# Patient Record
Sex: FEMALE | Race: WHITE | NOT HISPANIC OR LATINO | Employment: FULL TIME | URBAN - METROPOLITAN AREA
[De-identification: names, ages, dates, MRNs, and addresses within clinical notes are randomized per-mention and may not be internally consistent; named-entity substitution may affect disease eponyms.]

---

## 2017-08-27 ENCOUNTER — HOSPITAL ENCOUNTER (OUTPATIENT)
Dept: URGENT CARE | Facility: CLINIC | Age: 47
Discharge: HOME | End: 2017-08-27
Attending: FAMILY MEDICINE | Admitting: FAMILY MEDICINE

## 2019-01-10 ENCOUNTER — OFFICE (AMBULATORY)
Dept: URBAN - METROPOLITAN AREA CLINIC 64 | Facility: CLINIC | Age: 49
End: 2019-01-10

## 2019-01-10 VITALS
DIASTOLIC BLOOD PRESSURE: 83 MMHG | HEIGHT: 68 IN | HEART RATE: 84 BPM | WEIGHT: 293 LBS | SYSTOLIC BLOOD PRESSURE: 150 MMHG

## 2019-01-10 DIAGNOSIS — R19.4 CHANGE IN BOWEL HABIT: ICD-10-CM

## 2019-01-10 DIAGNOSIS — R11.0 NAUSEA: ICD-10-CM

## 2019-01-10 DIAGNOSIS — R10.13 EPIGASTRIC PAIN: ICD-10-CM

## 2019-01-10 PROCEDURE — 99244 OFF/OP CNSLTJ NEW/EST MOD 40: CPT | Performed by: INTERNAL MEDICINE

## 2019-01-10 RX ORDER — DEXLANSOPRAZOLE 60 MG/1
60 CAPSULE, DELAYED RELEASE ORAL
Qty: 30 | Refills: 11 | Status: ACTIVE
Start: 2019-01-10

## 2019-01-11 ENCOUNTER — OFFICE (AMBULATORY)
Dept: URBAN - METROPOLITAN AREA PATHOLOGY 4 | Facility: PATHOLOGY | Age: 49
End: 2019-01-11

## 2019-01-11 ENCOUNTER — ON CAMPUS - OUTPATIENT (AMBULATORY)
Dept: URBAN - METROPOLITAN AREA HOSPITAL 2 | Facility: HOSPITAL | Age: 49
End: 2019-01-11

## 2019-01-11 VITALS
OXYGEN SATURATION: 95 % | OXYGEN SATURATION: 97 % | WEIGHT: 293 LBS | SYSTOLIC BLOOD PRESSURE: 136 MMHG | DIASTOLIC BLOOD PRESSURE: 88 MMHG | DIASTOLIC BLOOD PRESSURE: 66 MMHG | DIASTOLIC BLOOD PRESSURE: 83 MMHG | OXYGEN SATURATION: 96 % | DIASTOLIC BLOOD PRESSURE: 72 MMHG | SYSTOLIC BLOOD PRESSURE: 123 MMHG | SYSTOLIC BLOOD PRESSURE: 133 MMHG | DIASTOLIC BLOOD PRESSURE: 80 MMHG | HEART RATE: 74 BPM | DIASTOLIC BLOOD PRESSURE: 90 MMHG | HEART RATE: 71 BPM | OXYGEN SATURATION: 98 % | SYSTOLIC BLOOD PRESSURE: 140 MMHG | HEIGHT: 68 IN | TEMPERATURE: 97.8 F | HEART RATE: 78 BPM | HEART RATE: 75 BPM | RESPIRATION RATE: 15 BRPM | SYSTOLIC BLOOD PRESSURE: 125 MMHG | RESPIRATION RATE: 18 BRPM | RESPIRATION RATE: 16 BRPM

## 2019-01-11 DIAGNOSIS — R10.13 EPIGASTRIC PAIN: ICD-10-CM

## 2019-01-11 DIAGNOSIS — Z48.815 ENCOUNTER FOR SURGICAL AFTERCARE FOLLOWING SURGERY ON THE DI: ICD-10-CM

## 2019-01-11 LAB
GI HISTOLOGY: A. UNSPECIFIED: (no result)
GI HISTOLOGY: PDF REPORT: (no result)

## 2019-01-11 PROCEDURE — 88305 TISSUE EXAM BY PATHOLOGIST: CPT | Performed by: INTERNAL MEDICINE

## 2019-01-11 PROCEDURE — 43239 EGD BIOPSY SINGLE/MULTIPLE: CPT | Performed by: INTERNAL MEDICINE

## 2019-01-11 RX ORDER — SUCRALFATE 1 G/1
TABLET ORAL
Qty: 120 | Refills: 11 | Status: ACTIVE

## 2019-05-21 ENCOUNTER — LAB (OUTPATIENT)
Dept: LAB | Facility: HOSPITAL | Age: 49
End: 2019-05-21

## 2019-05-21 ENCOUNTER — OFFICE VISIT (OUTPATIENT)
Dept: NEUROLOGY | Facility: CLINIC | Age: 49
End: 2019-05-21

## 2019-05-21 VITALS
HEIGHT: 68 IN | SYSTOLIC BLOOD PRESSURE: 150 MMHG | DIASTOLIC BLOOD PRESSURE: 92 MMHG | WEIGHT: 293 LBS | HEART RATE: 80 BPM | BODY MASS INDEX: 44.41 KG/M2 | OXYGEN SATURATION: 98 %

## 2019-05-21 DIAGNOSIS — G89.29 HIP PAIN, CHRONIC, RIGHT: ICD-10-CM

## 2019-05-21 DIAGNOSIS — G57.11 MERALGIA PARESTHETICA OF RIGHT SIDE: ICD-10-CM

## 2019-05-21 DIAGNOSIS — E11.42 DIABETIC PERIPHERAL NEUROPATHY (HCC): ICD-10-CM

## 2019-05-21 DIAGNOSIS — M25.551 HIP PAIN, CHRONIC, RIGHT: ICD-10-CM

## 2019-05-21 DIAGNOSIS — E11.42 DIABETIC PERIPHERAL NEUROPATHY (HCC): Primary | ICD-10-CM

## 2019-05-21 LAB
ERYTHROCYTE [SEDIMENTATION RATE] IN BLOOD: 6 MM/HR (ref 0–20)
FOLATE SERPL-MCNC: 9.68 NG/ML (ref 4.78–24.2)
VIT B12 BLD-MCNC: 702 PG/ML (ref 211–946)

## 2019-05-21 PROCEDURE — 82784 ASSAY IGA/IGD/IGG/IGM EACH: CPT

## 2019-05-21 PROCEDURE — 82525 ASSAY OF COPPER: CPT

## 2019-05-21 PROCEDURE — 83655 ASSAY OF LEAD: CPT | Performed by: PSYCHIATRY & NEUROLOGY

## 2019-05-21 PROCEDURE — 99244 OFF/OP CNSLTJ NEW/EST MOD 40: CPT | Performed by: PSYCHIATRY & NEUROLOGY

## 2019-05-21 PROCEDURE — 86334 IMMUNOFIX E-PHORESIS SERUM: CPT

## 2019-05-21 PROCEDURE — 85652 RBC SED RATE AUTOMATED: CPT | Performed by: PSYCHIATRY & NEUROLOGY

## 2019-05-21 PROCEDURE — 83825 ASSAY OF MERCURY: CPT | Performed by: PSYCHIATRY & NEUROLOGY

## 2019-05-21 PROCEDURE — 82595 ASSAY OF CRYOGLOBULIN: CPT

## 2019-05-21 PROCEDURE — 36415 COLL VENOUS BLD VENIPUNCTURE: CPT | Performed by: PSYCHIATRY & NEUROLOGY

## 2019-05-21 PROCEDURE — 82746 ASSAY OF FOLIC ACID SERUM: CPT | Performed by: PSYCHIATRY & NEUROLOGY

## 2019-05-21 PROCEDURE — 82607 VITAMIN B-12: CPT | Performed by: PSYCHIATRY & NEUROLOGY

## 2019-05-21 PROCEDURE — 84165 PROTEIN E-PHORESIS SERUM: CPT | Performed by: PSYCHIATRY & NEUROLOGY

## 2019-05-21 PROCEDURE — 84155 ASSAY OF PROTEIN SERUM: CPT | Performed by: PSYCHIATRY & NEUROLOGY

## 2019-05-21 PROCEDURE — 86592 SYPHILIS TEST NON-TREP QUAL: CPT | Performed by: PSYCHIATRY & NEUROLOGY

## 2019-05-21 PROCEDURE — 82175 ASSAY OF ARSENIC: CPT | Performed by: PSYCHIATRY & NEUROLOGY

## 2019-05-21 RX ORDER — LEVOTHYROXINE SODIUM 0.2 MG/1
200 TABLET ORAL DAILY
COMMUNITY

## 2019-05-21 RX ORDER — DIVALPROEX SODIUM 500 MG/1
1500 TABLET, DELAYED RELEASE ORAL DAILY
COMMUNITY
End: 2023-01-05

## 2019-05-21 RX ORDER — GABAPENTIN 300 MG/1
300 CAPSULE ORAL 3 TIMES DAILY
Qty: 90 CAPSULE | Refills: 5 | Status: SHIPPED | OUTPATIENT
Start: 2019-05-21 | End: 2019-08-06 | Stop reason: SDUPTHER

## 2019-05-21 RX ORDER — OMEPRAZOLE 20 MG/1
20 CAPSULE, DELAYED RELEASE ORAL DAILY
COMMUNITY
End: 2023-01-05

## 2019-05-21 RX ORDER — BUPROPION HYDROCHLORIDE 300 MG/1
300 TABLET ORAL DAILY
COMMUNITY
End: 2023-01-05

## 2019-05-21 RX ORDER — GABAPENTIN 400 MG/1
400 CAPSULE ORAL 3 TIMES DAILY
COMMUNITY
End: 2019-05-21 | Stop reason: SDUPTHER

## 2019-05-21 RX ORDER — ZIPRASIDONE HYDROCHLORIDE 60 MG/1
60 CAPSULE ORAL DAILY
COMMUNITY
End: 2023-01-05

## 2019-05-21 NOTE — PROGRESS NOTES
CC: Right hip pain with numbness tingling and burning on the right lateral thigh; numbness in both feet    HPI:  Marilou Abbott is a  48 y.o.  right-handed white female who was sent for a neurological consultation by Dr. Garcia/Michela Giordano NP regarding right hip pain with numbness tingling and burning in the right lateral thigh as well as numbness in both feet.  The patient is a long-standing diabetic and has been diagnosed with diabetic neuropathy previously.  She states she has never had an EMG however.  She has had numbness tingling and burning in the feet dating quite some time ago but now she just has severe numbness in the feet without tingling burning or other pains in the feet.  She denies numbness or tingling in the hands.    Her main complaint however is right hip pain.  She states that as a child the upper half of her body grew faster than the lower end of procedure was done on her hip where pins were placed.  She describes pain that radiates from the hip to the knee.  She describes numbness tingling and burning on the right lateral thigh which has been present for over a year.  She has some low back pain but she does not note sciatica.  Her back pain is worse when she is laying down on her back.  She cannot sleep on her right side because of pain in the right hip.  Weightbearing increases the pain in the hip and rotation of the hip joint increases the pain.  She indicates she has not seen an orthopedic surgeon.  She indicates an x-ray was done a year ago by her primary physician but no clear origin for symptoms apparently.  (I have not seen the films)    She has no history of significant head or spine injuries meningitis seizures stroke or syncope.  She describes no family history of stroke brain tumor brain hemorrhage or aneurysm of the brain artery.  She states that there are lots of family members with diabetes but none that she knows of with neuropathy.        Past Medical History:   Diagnosis Date    • Anxiety    • Asthma    • Depression    • Diabetes mellitus (CMS/HCC)    • Peripheral neuropathy    • Sleep apnea    • Thyroid disease          Past Surgical History:   Procedure Laterality Date   • AMPUTATION     • CHOLECYSTECTOMY     • GASTRIC BYPASS     • LEG SURGERY     • THYROIDECTOMY             Current Outpatient Medications:   •  buPROPion XL (WELLBUTRIN XL) 300 MG 24 hr tablet, Take 300 mg by mouth Daily., Disp: , Rfl:   •  divalproex (DEPAKOTE) 500 MG DR tablet, Take 1,500 mg by mouth Daily., Disp: , Rfl:   •  gabapentin (NEURONTIN) 300 MG capsule, Take 1 capsule by mouth 3 (Three) Times a Day., Disp: 90 capsule, Rfl: 5  •  insulin aspart (NovoLOG) 100 UNIT/ML patient supplied pump, Inject  under the skin into the appropriate area as directed Continuous., Disp: , Rfl:   •  levothyroxine (SYNTHROID, LEVOTHROID) 200 MCG tablet, Take 200 mcg by mouth Daily., Disp: , Rfl:   •  omeprazole (priLOSEC) 20 MG capsule, Take 20 mg by mouth Daily., Disp: , Rfl:   •  ziprasidone (GEODON) 60 MG capsule, Take 60 mg by mouth 2 (Two) Times a Day With Meals., Disp: , Rfl:       Family History   Problem Relation Age of Onset   • Cancer Mother    • Cancer Father    • Diabetes Father    • Heart disease Paternal Grandfather          Social History     Socioeconomic History   • Marital status:      Spouse name: Not on file   • Number of children: Not on file   • Years of education: Not on file   • Highest education level: Not on file   Tobacco Use   • Smoking status: Never Smoker   Substance and Sexual Activity   • Alcohol use: No     Frequency: Never   • Drug use: No         No Known Allergies      Pain Scale: 5/10 and up right hip        ROS:  Review of Systems   Constitutional: Negative.    HENT: Negative.    Eyes: Negative.    Respiratory: Positive for apnea. Negative for cough, choking, chest tightness, shortness of breath, wheezing and stridor.    Cardiovascular: Negative.    Gastrointestinal: Positive for  "abdominal pain. Negative for abdominal distention, anal bleeding, blood in stool, constipation, diarrhea, nausea, rectal pain and vomiting.   Endocrine: Negative.    Musculoskeletal: Positive for arthralgias, back pain and myalgias. Negative for gait problem, joint swelling, neck pain and neck stiffness.   Skin: Negative.    Allergic/Immunologic: Negative.    Neurological: Positive for numbness. Negative for dizziness, tremors, seizures, syncope, facial asymmetry, speech difficulty, weakness, light-headedness and headaches.   Hematological: Negative.    Psychiatric/Behavioral: Negative.          I have review and agree with the above ROS completed by the medical assistant.    Physical Exam:  Vitals:    05/21/19 1351   BP: 150/92   Pulse: 80   SpO2: 98%   Weight: 133 kg (293 lb)   Height: 172.7 cm (68\")     Orthostatic BP:  Body mass index is 44.55 kg/m².    Physical Exam  General: M obese white female no acute distress  HEENT: Normocephalic no evidence of trauma.  Discs flat.  Some AV nicking present.  Throat negative.  Neck: Supple.  No thyromegaly (thyroidectomy scar)  Heart: Regular rate and rhythm no murmurs.  Obese lower extremities without pedal edema  Extremities: Radial pulses were strong and simultaneous.  Straight leg raising signs are negative bilaterally.  Figure-of-four sign is positive on the right and so is internal hip rotation with knee bent.  The left hip was normal.      Neurological Exam:   Mental Status: Awake, alert, oriented to person, place and time.  Conversant without evidence of an affective disorder, thought disorder, delusions or  hallucinations.  Attention span and concentration are normal.  HCF: No aphasia, apraxia or dysarthria.  Recent and remote memory intact.  Knowledge of recent events intact.  CN: I:   II: Visual fields full without left inattention   III, VI, VI: Eye movements intact without nystagmus or ptosis.  Pupils equal  round and reactive to light.   V,VII: Light touch " and pinprick intact all 3 divisions of V.  Facial muscles symmetrical.   VIII: Hearing intact to finger rub   IX,X: Soft palate elevates symmetrically   XI: Sternomastoid and trapezius are strong.   XII: Tongue midline without atrophy or fasciculations  Motor: Normal tone and bulk in the upper and lower extremities   Power testing: Mild weakness of both abductor pollicis brevis muscles with the remainder of muscles in the upper extremities 5/5.  In the lower extremities there is weakness of toe extension and flexion and a very mild degree of weakness in dorsiflexion at the ankle.  All other muscles in the lower extremities 5/5  Reflexes: Upper extremities: Trace biceps and triceps otherwise areflexia        Lower extremities: Trace knee jerks absent ankle jerks        Toe signs: Absent left great toe.  Right toe sign likely withdrawal since Delores reflex is not upgoing.  Sensory: Light touch: Diffusely intact upper extremities.  Reduced from the knees down worse further distally        Pinprick: Diffusely intact upper extremities.  Reduced from the knees down worse further distally        Vibration: Reduced at the ankles        Position: Probably intact in the second toes bilaterally    Cerebellar: Finger-to-nose: Normal           Rapid movement: Normal                  Heel-to-shin: Normal  Gait and Station: Casual walk mildly broad-based with eversion of feet.  She can walk on toes but has difficulty walking on heels due to some tib anterior weakness.  Mildly abnormal tandem walk.  No Romberg no drift    Results:      No results found for: GLUCOSE, BUN, CREATININE, EGFRIFNONA, EGFRIFAFRI, BCR, CO2, CALCIUM, PROTENTOTREF, ALBUMIN, LABIL2, AST, ALT    No results found for: WBC, HGB, HCT, MCV, PLT      .No results found for: RPR      No results found for: TSH, S3CQNWW, Z9WXSNC, THYROIDAB      Lab Results   Component Value Date    UCIOYRYT17 702 05/21/2019         Lab Results   Component Value Date    FOLATE 9.68  "05/21/2019         No results found for: HGBA1C            Assessment:   1.  Diabetic peripheral neuropathy  2.  Meralgia paresthetica right thigh  3.  Right hip pain with multiple \"arrows \"pointing at the hip joint          Plan:  1.  EMG right leg  2.  Change gabapentin dosage to 300 mg 3 times daily.  If no effect may escalate the dosage to 600 mg 3 times daily  3.  If gabapentin is not helpful then option to block the nerve to see how much of the pain disappears.  If this is very helpful consider a decompression of the lateral femoral cutaneous nerve at the pelvic brim.  (Dr. Zhu plastic surgeon)  4.  Recommended significant weight loss  5.  Continue target control of blood sugar and lipids  6.  Referred to Dr. Reynolds of orthopedic surgery (or associate) regarding her right hip pain.  She has previous history of pins placed in the hip as a child  7.  Labs for treatable causes of peripheral neuropathy  8.  Review labs at the time I see her for EMG                        Dictated utilizing Dragon dictation.     "

## 2019-05-22 LAB — RPR SER QL: NORMAL

## 2019-05-23 LAB
ALBUMIN SERPL-MCNC: 3.5 G/DL (ref 2.9–4.4)
ALBUMIN/GLOB SERPL: 1 {RATIO} (ref 0.7–1.7)
ALPHA1 GLOB FLD ELPH-MCNC: 0.2 G/DL (ref 0–0.4)
ALPHA2 GLOB SERPL ELPH-MCNC: 0.8 G/DL (ref 0.4–1)
B-GLOBULIN SERPL ELPH-MCNC: 1.2 G/DL (ref 0.7–1.3)
GAMMA GLOB SERPL ELPH-MCNC: 1.3 G/DL (ref 0.4–1.8)
GLOBULIN SER CALC-MCNC: 3.5 G/DL (ref 2.2–3.9)
IGA SERPL-MCNC: 219 MG/DL (ref 87–352)
IGG SERPL-MCNC: 1149 MG/DL (ref 700–1600)
IGM SERPL-MCNC: 85 MG/DL (ref 26–217)
Lab: NORMAL
M-SPIKE: NORMAL G/DL
PROT PATTERN SERPL ELPH-IMP: NORMAL
PROT PATTERN SERPL IFE-IMP: NORMAL
PROT SERPL-MCNC: 7 G/DL (ref 6–8.5)

## 2019-05-24 LAB
ARSENIC BLD-MCNC: 4 UG/L (ref 2–23)
COPPER SERPL-MCNC: 118 UG/DL (ref 72–166)
LEAD BLD-MCNC: NORMAL UG/DL (ref 0–4)
MERCURY BLD-MCNC: NORMAL UG/L (ref 0–14.9)

## 2019-05-28 LAB — CRYOGLOB SER QL 1D COLD INC: NORMAL

## 2019-07-08 PROBLEM — K21.9 GASTROESOPHAGEAL REFLUX DISEASE: Status: ACTIVE | Noted: 2019-07-08

## 2019-07-08 PROBLEM — E66.9 OBESITY: Status: ACTIVE | Noted: 2019-01-03

## 2019-07-08 PROBLEM — M25.551 PAIN IN RIGHT HIP: Status: ACTIVE | Noted: 2017-08-27

## 2019-07-08 PROBLEM — E11.65 TYPE 2 DIABETES MELLITUS WITH HYPERGLYCEMIA (HCC): Status: ACTIVE | Noted: 2018-11-15

## 2019-07-08 PROBLEM — M79.604 PAIN OF RIGHT LOWER EXTREMITY: Status: ACTIVE | Noted: 2019-04-04

## 2019-07-08 PROBLEM — J45.21 ASTHMA, INTERMITTENT, WITH ACUTE EXACERBATION: Status: ACTIVE | Noted: 2017-11-08

## 2019-07-08 PROBLEM — E03.9 HYPOTHYROIDISM: Status: ACTIVE | Noted: 2018-11-15

## 2019-07-08 PROBLEM — E10.9 TYPE 1 DIABETES MELLITUS (HCC): Status: ACTIVE | Noted: 2019-07-08

## 2019-07-08 RX ORDER — OMEPRAZOLE 20 MG/1
TABLET, DELAYED RELEASE ORAL
COMMUNITY
Start: 2019-01-07 | End: 2019-10-18

## 2019-07-08 RX ORDER — INSULIN GLARGINE 100 [IU]/ML
INJECTION, SOLUTION SUBCUTANEOUS
COMMUNITY
Start: 2019-02-07 | End: 2019-09-09 | Stop reason: SDUPTHER

## 2019-07-11 ENCOUNTER — OFFICE VISIT (OUTPATIENT)
Dept: ENDOCRINOLOGY | Facility: CLINIC | Age: 49
End: 2019-07-11

## 2019-07-11 VITALS
WEIGHT: 293 LBS | BODY MASS INDEX: 44.41 KG/M2 | OXYGEN SATURATION: 97 % | HEIGHT: 68 IN | HEART RATE: 77 BPM | SYSTOLIC BLOOD PRESSURE: 120 MMHG | DIASTOLIC BLOOD PRESSURE: 80 MMHG

## 2019-07-11 DIAGNOSIS — Z79.4 TYPE 2 DIABETES MELLITUS WITH HYPERGLYCEMIA, WITH LONG-TERM CURRENT USE OF INSULIN (HCC): Primary | ICD-10-CM

## 2019-07-11 DIAGNOSIS — E11.42 DIABETIC PERIPHERAL NEUROPATHY (HCC): ICD-10-CM

## 2019-07-11 DIAGNOSIS — E66.01 CLASS 3 SEVERE OBESITY DUE TO EXCESS CALORIES WITH BODY MASS INDEX (BMI) OF 40.0 TO 44.9 IN ADULT, UNSPECIFIED WHETHER SERIOUS COMORBIDITY PRESENT (HCC): ICD-10-CM

## 2019-07-11 DIAGNOSIS — E03.9 ACQUIRED HYPOTHYROIDISM: ICD-10-CM

## 2019-07-11 DIAGNOSIS — E11.65 TYPE 2 DIABETES MELLITUS WITH HYPERGLYCEMIA, WITH LONG-TERM CURRENT USE OF INSULIN (HCC): Primary | ICD-10-CM

## 2019-07-11 PROCEDURE — 99214 OFFICE O/P EST MOD 30 MIN: CPT | Performed by: INTERNAL MEDICINE

## 2019-07-11 NOTE — PROGRESS NOTES
Endocrine Progress Note Outpatient     Patient Care Team:  Kishore Penn MD as PCP - General    Chief Complaint: Follow-up diabetes    HPI: 48-year-old female with history of type 2 diabetes, hypothyroidism and obesity is here for follow-up.  Diabetes was diagnosed in 1991.  Currently on T slim insulin pump with Dexcom continuous glucose monitoring system.  Her current insulin pump settings are as follows: Basal rates midnight 2.0 units/h, 9 AM 3.5 units/h.  Insulin carb ratio is 1 unit 43 g of carbohydrate and insulin sensitive factor is 1 unit for each 25 g blood sugar with a target pressure of 140.    She could not tolerate Janumet in the past due to abdominal pain.  She had gastric bypass surgery 2004.  She has history of great toe amputation in 2015 and she is following with her podiatrist.    Hypothyroidism: Currently on levothyroxine supplementation.    Obesity: Trying to work on her lifestyle.    Past Medical History:   Diagnosis Date   • Anxiety    • Asthma    • Depression    • Diabetes mellitus (CMS/HCC)    • Peripheral neuropathy    • Sleep apnea    • Thyroid disease        Social History     Socioeconomic History   • Marital status:      Spouse name: Not on file   • Number of children: Not on file   • Years of education: Not on file   • Highest education level: Not on file   Tobacco Use   • Smoking status: Never Smoker   Substance and Sexual Activity   • Alcohol use: No     Frequency: Never   • Drug use: No       Family History   Problem Relation Age of Onset   • Cancer Mother    • Cancer Father    • Diabetes Father    • Heart disease Paternal Grandfather        No Known Allergies    ROS:   Constitutional:  Denies fatigue, tiredness.    Eyes:  Denies change in visual acuity   HENT:  Denies nasal congestion or sore throat   Respiratory: denies cough, shortness of breath.   Cardiovascular:  denies chest pain, edema   GI:  Denies abdominal pain, nausea, vomiting.   Musculoskeletal:  Denies  back pain or joint pain   Integument:  Denies dry skin and rash   Neurologic:  Denies headache, focal weakness or sensory changes   Endocrine:  Denies polyuria or polydipsia   Psychiatric:  Denies depression or anxiety      Vitals:    07/11/19 0957   BP: 120/80   Pulse: 77   SpO2: 97%       Physical Exam:  GEN: NAD, conversant  EYES: EOMI, PERRL, no conjunctival erythema  NECK: no thyromegaly, full ROM   CV: RRR, no murmurs/rubs/gallops, no peripheral edema  LUNG: CTAB, no wheezes/rales/ronchi  SKIN: no rashes, no acanthosis  MSK: no deformities, full ROM of all extremities  NEURO: no tremors, DTR normal  PSYCH: AOX3, appropriate mood, affect normal      Results Review:     I reviewed the patient's new clinical results.    No results found for: HGBA1C   Lab Results   Component Value Date    PROTENTOTREF 7.0 05/21/2019    ALBUMIN 3.5 05/21/2019    LABIL2 1.0 05/21/2019     Labs from July 1, 2019 showed an A1c of 8.1%, TSH 1.43, LDL 72, triglyceride 178, BUN 13, creatinine 0.87.    Medication Review: Reviewed.       Current Outpatient Medications:   •  buPROPion XL (WELLBUTRIN XL) 300 MG 24 hr tablet, Take 300 mg by mouth Daily., Disp: , Rfl:   •  divalproex (DEPAKOTE) 500 MG DR tablet, Take 1,500 mg by mouth Daily., Disp: , Rfl:   •  gabapentin (NEURONTIN) 300 MG capsule, Take 1 capsule by mouth 3 (Three) Times a Day., Disp: 90 capsule, Rfl: 5  •  glucagon (GLUCAGEN HYPOKIT) 1 MG injection, GLUCAGEN HYPOKIT 1 MG SOLR, Disp: , Rfl:   •  insulin aspart (NovoLOG) 100 UNIT/ML patient supplied pump, Inject  under the skin into the appropriate area as directed Continuous., Disp: , Rfl:   •  insulin glargine (LANTUS) 100 UNIT/ML injection, LANTUS 100 UNIT/ML SOLN, Disp: , Rfl:   •  Insulin Pump Accessories misc, INSULIN PUMP, Disp: , Rfl:   •  levothyroxine (SYNTHROID, LEVOTHROID) 200 MCG tablet, Take 200 mcg by mouth Daily., Disp: , Rfl:   •  omeprazole (priLOSEC) 20 MG capsule, Take 20 mg by mouth Daily., Disp: , Rfl:   •  " ziprasidone (GEODON) 60 MG capsule, Take 60 mg by mouth 2 (Two) Times a Day With Meals., Disp: , Rfl:   •  omeprazole OTC (PRILOSEC OTC) 20 MG EC tablet, PRILOSEC OTC 20 MG TBEC, Disp: , Rfl:   •  progesterone (PROMETRIUM) 200 MG capsule, , Disp: , Rfl:       Assessment/Plan   Diabetes mellitus type 2: Uncontrolled but is stable with A1c 8.1%.  This time I will add Jardiance 10 mg p.o. daily.  Side effects of dehydration and UTI discussed with her and advised her to drink plenty of water.  We will continue insulin pump settings as of right now and follow blood sugars and A1c.  2.  Hypothyroidism: Well-controlled with TSH of 1.43.  Continue current dose of levothyroxine  3.Obesity: Stable/             Jocelyn Flores MD FACE.  06/15/19  4:34 PM      EMR Dragon / transcription disclaimer:     \"Dictated utilizing Dragon dictation\".                 "

## 2019-08-06 ENCOUNTER — PROCEDURE VISIT (OUTPATIENT)
Dept: NEUROLOGY | Facility: CLINIC | Age: 49
End: 2019-08-06

## 2019-08-06 VITALS — HEIGHT: 68 IN | WEIGHT: 293 LBS | BODY MASS INDEX: 44.41 KG/M2

## 2019-08-06 DIAGNOSIS — E11.42 DIABETIC PERIPHERAL NEUROPATHY (HCC): ICD-10-CM

## 2019-08-06 DIAGNOSIS — G57.11 MERALGIA PARESTHETICA OF RIGHT SIDE: Primary | ICD-10-CM

## 2019-08-06 PROCEDURE — 95909 NRV CNDJ TST 5-6 STUDIES: CPT | Performed by: PSYCHIATRY & NEUROLOGY

## 2019-08-06 PROCEDURE — 95886 MUSC TEST DONE W/N TEST COMP: CPT | Performed by: PSYCHIATRY & NEUROLOGY

## 2019-08-06 RX ORDER — GABAPENTIN 300 MG/1
300 CAPSULE ORAL 3 TIMES DAILY
Qty: 90 CAPSULE | Refills: 5 | Status: SHIPPED | OUTPATIENT
Start: 2019-08-06

## 2019-08-06 NOTE — PROGRESS NOTES
EMG and Nerve Conduction Studies    I.      Instrument used: Neuromax 1002  II.     Please see data sheets for tabular summary of NCS and details on methods, temperatures and lab standards.   III.    EMG muscles tested for upper extremity studies include the deltoid, biceps, triceps, pronator teres, extensor digitorum communis, first dorsal interosseous and abductor pollicis brevis.    IV.   EMG muscles tested for lower extremity studies include the vastus lateralis, tibialis anterior, peroneus longus, medial gastrocnemius and extensor digitorum brevis.    V.    Additional muscles tested as needed.  Paraspinal muscles tested as needed.   VI.   Please see data sheets for tabular summary of EMG findings.   VII. The complete report includes the data sheets.      Indication: Back and right hip pain, right meralgia paresthetica.  History of diabetic neuropathy  History: 48-year-old woman with long-standing diabetes who has numbness tingling and pain in the feet describes back and right hip pain with previous history of right hip pinning many years ago.  Currently has numbness tingling burning on the right lateral thigh consistent with meralgia paresthetica      Ht: 172.7 cm  Wt: 136 kg; BMI 45.5  HbA1C: No results found for: HGBA1C  TSH: No results found for: TSH    Technical summary:  Nerve conduction studies were obtained in the right leg with 1 comparison on the left.  The feet were warmed prior to study.  Skin temperature was 32 °C measured at the ankles.  Needle examination was obtained on selected muscles in both legs.    Results:  1.  Absent right sural sensory potential at 14 cm with normal latency and amplitude from 10 cm  2.  Prolonged right superficial peroneal sensory latency at 4.6 ms with low amplitude of 1.7 µV.  Normal left superficial peroneal sensory latency with low amplitude of 1.7 µV.  3.  Slow right peroneal motor velocity below the knee at 32 m/s with normal velocity in the short segment across the  fibular head.  Normal distal latency with very low amplitude of 0.233 mV from ankle stimulation.  4.  Slow right tibial motor velocity at 37 m/s with normal distal latency and amplitude from ankle stimulation.  5.  Needle examination of selected muscles in both legs showed a few fibrillations in the left extensor digitorum brevis with very few motor units and very much reduced interference pattern.  The right extensor digitorum brevis showed no insertional activity or motor units.  The remaining muscles tested showed normal insertional activities, motor units and recruitment patterns or somewhat reduced recruitment patterns with complaints of pain    Impression:  Abnormal study consistent with peripheral neuropathy.  No additional findings were suspicious for a lumbosacral radiculopathy on either side by this study.  Study results were discussed with the patient.    Regulo Borjas M.D.      Addendum:  The patient indicates that the gabapentin at 300 mg 3 times daily does significantly affect the right hip and thigh pain.  She would like to continue at same dose.  She denies particular side effects.  Laboratories which were done to screen for other treatable causes of peripheral neuropathy did not show any other findings.  She had a sedimentation rate of 6 and RPR was nonreactive.  Serum heavy metal screen was negative.  SPEP and IEP were negative for an MGUS and cryoglobulins were negative.  Copper level was 118.  B12 level was 702 and folate level was 9.68.  At this point I do not feel further neurologic investigation is needed.  We did discuss decompression of the right lateral femoral cutaneous nerve but only if all other treatments were not successful in alleviating her pain.  Dr. Zhu is a plastic surgeon who could evaluate this if needed.  Return as needed.        Dictated utilizing Dragon dictation.

## 2019-09-10 RX ORDER — INSULIN GLARGINE 100 [IU]/ML
INJECTION, SOLUTION SUBCUTANEOUS
Qty: 20 ML | Refills: 1 | Status: SHIPPED | OUTPATIENT
Start: 2019-09-10 | End: 2019-11-10 | Stop reason: SDUPTHER

## 2019-09-23 RX ORDER — GABAPENTIN 300 MG/1
CAPSULE ORAL
Qty: 270 CAPSULE | Refills: 1 | Status: SHIPPED | OUTPATIENT
Start: 2019-09-23 | End: 2019-10-18

## 2019-10-17 ENCOUNTER — TELEPHONE (OUTPATIENT)
Dept: ENDOCRINOLOGY | Facility: CLINIC | Age: 49
End: 2019-10-17

## 2019-10-18 ENCOUNTER — OFFICE VISIT (OUTPATIENT)
Dept: ENDOCRINOLOGY | Facility: CLINIC | Age: 49
End: 2019-10-18

## 2019-10-18 VITALS
WEIGHT: 293 LBS | BODY MASS INDEX: 44.41 KG/M2 | OXYGEN SATURATION: 97 % | DIASTOLIC BLOOD PRESSURE: 88 MMHG | HEART RATE: 87 BPM | SYSTOLIC BLOOD PRESSURE: 138 MMHG | HEIGHT: 68 IN

## 2019-10-18 DIAGNOSIS — E03.9 ACQUIRED HYPOTHYROIDISM: ICD-10-CM

## 2019-10-18 DIAGNOSIS — E11.65 TYPE 2 DIABETES MELLITUS WITH HYPERGLYCEMIA, WITH LONG-TERM CURRENT USE OF INSULIN (HCC): Primary | ICD-10-CM

## 2019-10-18 DIAGNOSIS — E66.01 CLASS 3 SEVERE OBESITY DUE TO EXCESS CALORIES WITH BODY MASS INDEX (BMI) OF 40.0 TO 44.9 IN ADULT, UNSPECIFIED WHETHER SERIOUS COMORBIDITY PRESENT (HCC): ICD-10-CM

## 2019-10-18 DIAGNOSIS — Z79.4 TYPE 2 DIABETES MELLITUS WITH HYPERGLYCEMIA, WITH LONG-TERM CURRENT USE OF INSULIN (HCC): Primary | ICD-10-CM

## 2019-10-18 LAB — GLUCOSE BLDC GLUCOMTR-MCNC: 114 MG/DL (ref 70–130)

## 2019-10-18 PROCEDURE — 99214 OFFICE O/P EST MOD 30 MIN: CPT | Performed by: INTERNAL MEDICINE

## 2019-10-18 PROCEDURE — 82962 GLUCOSE BLOOD TEST: CPT | Performed by: INTERNAL MEDICINE

## 2019-10-18 NOTE — PATIENT INSTRUCTIONS
Start Trulicity 0.75 mg subcu once a week  Continue current insulin pump settings  Always keep glucose source with you in case of low blood sugar  Always get annual eye exam and flu vaccine  Follow-up in 3 months with labs.

## 2019-10-18 NOTE — PROGRESS NOTES
Endocrine Progress Note Outpatient     Patient Care Team:  Kishore Penn MD as PCP - General    Chief Complaint: Follow-up diabetes    HPI: 48-year-old female with history of type 2 diabetes, hypothyroidism and obesity is here for follow-up.  Diabetes was diagnosed in 1991.  Currently on T slim insulin pump with Dexcom continuous glucose monitoring system.  Using NovoLog and her insulin pump.  Her current insulin pump settings are as follows: Basal rates midnight 2.0 units/h, 9 AM 3.5 units/h.  Insulin carb ratio is 1 unit 3 g of carbohydrate and insulin sensitive factor is 1 unit for each 25 g blood sugar with a target pressure of 140.    She could not tolerate Janumet in the past due to abdominal pain.  Could not tolerate Jardiance due to side effects either.  She had gastric bypass surgery 2004.  She has history of great toe amputation in 2015 and she is following with her podiatrist.    Hypothyroidism: Currently on levothyroxine supplementation.    Obesity: Trying to work on her lifestyle.    Past Medical History:   Diagnosis Date   • Anxiety    • Asthma    • Depression    • Diabetes mellitus (CMS/[facility])    • Peripheral neuropathy    • Sleep apnea    • Thyroid disease    • Type 2 diabetes mellitus (CMS/HCC)        Social History     Socioeconomic History   • Marital status:      Spouse name: Not on file   • Number of children: Not on file   • Years of education: Not on file   • Highest education level: Not on file   Tobacco Use   • Smoking status: Never Smoker   • Smokeless tobacco: Never Used   Substance and Sexual Activity   • Alcohol use: No     Frequency: Never   • Drug use: No   • Sexual activity: Defer       Family History   Problem Relation Age of Onset   • Cancer Mother    • Cancer Father    • Diabetes Father    • Heart disease Paternal Grandfather        No Known Allergies    ROS:   Constitutional:  Denies fatigue, tiredness.    Eyes:  Denies change in visual acuity   HENT:  Denies nasal  congestion or sore throat   Respiratory: denies cough, shortness of breath.   Cardiovascular:  denies chest pain, edema   GI:  Denies abdominal pain, nausea, vomiting.   Musculoskeletal:  Denies back pain or joint pain   Integument:  Denies dry skin and rash   Neurologic:  Denies headache, focal weakness or sensory changes   Endocrine:  Denies polyuria or polydipsia   Psychiatric:  Denies depression or anxiety      Vitals:    10/18/19 1049   BP: 138/88   Pulse: 87   SpO2: 97%       Physical Exam:  GEN: NAD, conversant, obese  EYES: EOMI, PERRL, no conjunctival erythema  NECK: no thyromegaly, full ROM   CV: RRR, no murmurs/rubs/gallops, no peripheral edema  LUNG: CTAB, no wheezes/rales/ronchi  SKIN: no rashes, no acanthosis  MSK: no deformities, full ROM of all extremities  NEURO: no tremors, DTR normal  PSYCH: AOX3, appropriate mood, affect normal      Results Review:     I reviewed the patient's new clinical results.      Lab Results   Component Value Date    PROTENTOTREF 7.0 05/21/2019    ALBUMIN 3.5 05/21/2019    LABIL2 1.0 05/21/2019     Labs from October 15, 2019 showed an A1c of 8.1%, sodium 138, potassium 4.5, chloride 103, CO 26, glucose 175, BUN 11, creatinine 0.9, AST 22, ALT 17, LDL cholesterol 84, triglycerides 192, urine microalbumin creatinine ratio was 2.6.  Labs from July 1, 2019 showed an A1c of 8.1%, TSH 1.43, LDL 72, triglyceride 178, BUN 13, creatinine 0.87.    Medication Review: Reviewed.       Current Outpatient Medications:   •  buPROPion XL (WELLBUTRIN XL) 300 MG 24 hr tablet, Take 300 mg by mouth Daily., Disp: , Rfl:   •  divalproex (DEPAKOTE) 500 MG DR tablet, Take 1,500 mg by mouth Daily., Disp: , Rfl:   •  gabapentin (NEURONTIN) 300 MG capsule, Take 1 capsule by mouth 3 (Three) Times a Day., Disp: 90 capsule, Rfl: 5  •  glucagon (GLUCAGEN HYPOKIT) 1 MG injection, GLUCAGEN HYPOKIT 1 MG SOLR, Disp: , Rfl:   •  insulin aspart (NovoLOG) 100 UNIT/ML patient supplied pump, Inject  under the  "skin into the appropriate area as directed Continuous. Use in insulin pump, Disp: , Rfl:   •  Insulin Pump Accessories misc, INSULIN PUMP, Disp: , Rfl:   •  LANTUS 100 UNIT/ML injection, INJECT 65 UNITS IN CASE OF PUMP FAILURE. DX:e10.65, Disp: 20 mL, Rfl: 1  •  levothyroxine (SYNTHROID, LEVOTHROID) 200 MCG tablet, Take 200 mcg by mouth Daily., Disp: , Rfl:   •  omeprazole (priLOSEC) 20 MG capsule, Take 20 mg by mouth Daily., Disp: , Rfl:   •  ziprasidone (GEODON) 60 MG capsule, Take 60 mg by mouth 2 (Two) Times a Day With Meals., Disp: , Rfl:       Assessment/Plan   1.  Diabetes mellitus type 2: Uncontrolled but is stable with A1c 8.1%.  This time I will add Trulicity 0.75 mg sq weekly, and effects of abdominal pain and nausea and vomiting discussed with her.  Nausea will get better with time however if she starts having vomiting or abdominal pain she will stop it and will let us know.  We will continue insulin pump setting as of right now and follow blood sugars and A1c.    2.  Hypothyroidism: Well-controlled with TSH of 1.43.  Continue current dose of levothyroxine  3.Obesity: Stable            Jocelyn Flores MD FACE.  06/15/19  4:34 PM      EMR Dragon / transcription disclaimer:     \"Dictated utilizing Dragon dictation\".                 "

## 2019-11-12 RX ORDER — INSULIN GLARGINE 100 [IU]/ML
INJECTION, SOLUTION SUBCUTANEOUS
Qty: 20 ML | Refills: 1 | Status: SHIPPED | OUTPATIENT
Start: 2019-11-12 | End: 2020-02-26

## 2019-12-03 RX ORDER — OMEPRAZOLE 20 MG/1
CAPSULE, DELAYED RELEASE ORAL
Qty: 90 CAPSULE | Refills: 0 | Status: SHIPPED | OUTPATIENT
Start: 2019-12-03 | End: 2020-04-21

## 2019-12-06 ENCOUNTER — TELEPHONE (OUTPATIENT)
Dept: ENDOCRINOLOGY | Facility: CLINIC | Age: 49
End: 2019-12-06

## 2020-01-09 PROBLEM — H10.9 CONJUNCTIVITIS: Status: ACTIVE | Noted: 2020-01-09

## 2020-01-09 PROBLEM — B00.50 HERPES SIMPLEX WITH OPHTHALMIC COMPLICATION: Status: ACTIVE | Noted: 2020-01-09

## 2020-01-09 PROBLEM — H52.203 ASTIGMATISM, BILATERAL: Status: ACTIVE | Noted: 2020-01-09

## 2020-01-09 PROBLEM — H52.229 REGULAR ASTIGMATISM: Status: ACTIVE | Noted: 2020-01-09

## 2020-01-09 PROBLEM — H52.223 REGULAR ASTIGMATISM OF BOTH EYES: Status: ACTIVE | Noted: 2020-01-09

## 2020-01-09 PROBLEM — H52.4 BILATERAL PRESBYOPIA: Status: ACTIVE | Noted: 2020-01-09

## 2020-01-09 PROBLEM — E11.9 TYPE 2 OR UNSPECIFIED TYPE DIABETES MELLITUS: Status: ACTIVE | Noted: 2020-01-09

## 2020-02-26 DIAGNOSIS — Z79.4 TYPE 2 DIABETES MELLITUS WITH HYPERGLYCEMIA, WITH LONG-TERM CURRENT USE OF INSULIN (HCC): Primary | ICD-10-CM

## 2020-02-26 DIAGNOSIS — E11.65 TYPE 2 DIABETES MELLITUS WITH HYPERGLYCEMIA, WITH LONG-TERM CURRENT USE OF INSULIN (HCC): Primary | ICD-10-CM

## 2020-02-26 RX ORDER — INSULIN GLARGINE 100 [IU]/ML
INJECTION, SOLUTION SUBCUTANEOUS
Qty: 20 ML | Refills: 1 | Status: SHIPPED | OUTPATIENT
Start: 2020-02-26 | End: 2020-06-03

## 2020-04-21 RX ORDER — OMEPRAZOLE 20 MG/1
CAPSULE, DELAYED RELEASE ORAL
Qty: 90 CAPSULE | Refills: 0 | Status: SHIPPED | OUTPATIENT
Start: 2020-04-21 | End: 2020-05-29

## 2020-05-20 DIAGNOSIS — E11.65 TYPE 2 DIABETES MELLITUS WITH HYPERGLYCEMIA, WITH LONG-TERM CURRENT USE OF INSULIN (HCC): ICD-10-CM

## 2020-05-20 DIAGNOSIS — E03.9 ACQUIRED HYPOTHYROIDISM: ICD-10-CM

## 2020-05-20 DIAGNOSIS — E66.01 CLASS 3 SEVERE OBESITY DUE TO EXCESS CALORIES WITH BODY MASS INDEX (BMI) OF 40.0 TO 44.9 IN ADULT, UNSPECIFIED WHETHER SERIOUS COMORBIDITY PRESENT (HCC): ICD-10-CM

## 2020-05-20 DIAGNOSIS — Z79.4 TYPE 2 DIABETES MELLITUS WITH HYPERGLYCEMIA, WITH LONG-TERM CURRENT USE OF INSULIN (HCC): ICD-10-CM

## 2020-05-20 RX ORDER — DULAGLUTIDE 0.75 MG/.5ML
INJECTION, SOLUTION SUBCUTANEOUS
Qty: 2 ML | Refills: 2 | Status: SHIPPED | OUTPATIENT
Start: 2020-05-20 | End: 2020-08-14

## 2020-05-21 ENCOUNTER — LAB (OUTPATIENT)
Dept: LAB | Facility: HOSPITAL | Age: 50
End: 2020-05-21

## 2020-05-21 DIAGNOSIS — Z79.4 TYPE 2 DIABETES MELLITUS WITH HYPERGLYCEMIA, WITH LONG-TERM CURRENT USE OF INSULIN (HCC): ICD-10-CM

## 2020-05-21 DIAGNOSIS — E03.9 ACQUIRED HYPOTHYROIDISM: ICD-10-CM

## 2020-05-21 DIAGNOSIS — E11.65 TYPE 2 DIABETES MELLITUS WITH HYPERGLYCEMIA, WITH LONG-TERM CURRENT USE OF INSULIN (HCC): ICD-10-CM

## 2020-05-21 DIAGNOSIS — E66.01 CLASS 3 SEVERE OBESITY DUE TO EXCESS CALORIES WITH BODY MASS INDEX (BMI) OF 40.0 TO 44.9 IN ADULT, UNSPECIFIED WHETHER SERIOUS COMORBIDITY PRESENT (HCC): ICD-10-CM

## 2020-05-21 LAB
ALBUMIN SERPL-MCNC: 3.7 G/DL (ref 3.5–5.2)
ALBUMIN UR-MCNC: <1.2 MG/DL
ALBUMIN/GLOB SERPL: 1.2 G/DL
ALP SERPL-CCNC: 99 U/L (ref 39–117)
ALT SERPL W P-5'-P-CCNC: 17 U/L (ref 1–33)
ANION GAP SERPL CALCULATED.3IONS-SCNC: 9.7 MMOL/L (ref 5–15)
AST SERPL-CCNC: 18 U/L (ref 1–32)
BILIRUB SERPL-MCNC: 0.2 MG/DL (ref 0.2–1.2)
BUN BLD-MCNC: 11 MG/DL (ref 6–20)
BUN/CREAT SERPL: 12.5 (ref 7–25)
CALCIUM SPEC-SCNC: 8.8 MG/DL (ref 8.6–10.5)
CHLORIDE SERPL-SCNC: 102 MMOL/L (ref 98–107)
CHOLEST SERPL-MCNC: 127 MG/DL (ref 0–200)
CO2 SERPL-SCNC: 26.3 MMOL/L (ref 22–29)
CREAT BLD-MCNC: 0.88 MG/DL (ref 0.57–1)
CREAT UR-MCNC: 50.2 MG/DL
GFR SERPL CREATININE-BSD FRML MDRD: 68 ML/MIN/1.73
GLOBULIN UR ELPH-MCNC: 3.1 GM/DL
GLUCOSE BLD-MCNC: 151 MG/DL (ref 65–99)
HBA1C MFR BLD: 8.2 % (ref 3.5–5.6)
HDLC SERPL-MCNC: 45 MG/DL (ref 40–60)
LDLC SERPL CALC-MCNC: 54 MG/DL (ref 0–100)
LDLC/HDLC SERPL: 1.21 {RATIO}
MICROALBUMIN/CREAT UR: NORMAL MG/G{CREAT}
POTASSIUM BLD-SCNC: 4.2 MMOL/L (ref 3.5–5.2)
PROT SERPL-MCNC: 6.8 G/DL (ref 6–8.5)
SODIUM BLD-SCNC: 138 MMOL/L (ref 136–145)
T4 FREE SERPL-MCNC: 1.56 NG/DL (ref 0.93–1.7)
TRIGL SERPL-MCNC: 138 MG/DL (ref 0–150)
TSH SERPL DL<=0.05 MIU/L-ACNC: 0.56 UIU/ML (ref 0.27–4.2)
VLDLC SERPL-MCNC: 27.6 MG/DL (ref 5–40)

## 2020-05-21 PROCEDURE — 80061 LIPID PANEL: CPT

## 2020-05-21 PROCEDURE — 83036 HEMOGLOBIN GLYCOSYLATED A1C: CPT

## 2020-05-21 PROCEDURE — 36415 COLL VENOUS BLD VENIPUNCTURE: CPT

## 2020-05-21 PROCEDURE — 84439 ASSAY OF FREE THYROXINE: CPT

## 2020-05-21 PROCEDURE — 84443 ASSAY THYROID STIM HORMONE: CPT

## 2020-05-21 PROCEDURE — 80053 COMPREHEN METABOLIC PANEL: CPT

## 2020-05-21 PROCEDURE — 82043 UR ALBUMIN QUANTITATIVE: CPT

## 2020-05-21 PROCEDURE — 82570 ASSAY OF URINE CREATININE: CPT

## 2020-05-29 ENCOUNTER — TELEMEDICINE (OUTPATIENT)
Dept: ENDOCRINOLOGY | Facility: CLINIC | Age: 50
End: 2020-05-29

## 2020-05-29 VITALS — HEIGHT: 68 IN | BODY MASS INDEX: 42.44 KG/M2 | WEIGHT: 280 LBS

## 2020-05-29 DIAGNOSIS — Z79.4 TYPE 2 DIABETES MELLITUS WITH HYPERGLYCEMIA, WITH LONG-TERM CURRENT USE OF INSULIN (HCC): Primary | ICD-10-CM

## 2020-05-29 DIAGNOSIS — E03.9 ACQUIRED HYPOTHYROIDISM: ICD-10-CM

## 2020-05-29 DIAGNOSIS — I10 ESSENTIAL HYPERTENSION: ICD-10-CM

## 2020-05-29 DIAGNOSIS — E11.65 TYPE 2 DIABETES MELLITUS WITH HYPERGLYCEMIA, WITH LONG-TERM CURRENT USE OF INSULIN (HCC): Primary | ICD-10-CM

## 2020-05-29 PROCEDURE — 99214 OFFICE O/P EST MOD 30 MIN: CPT | Performed by: INTERNAL MEDICINE

## 2020-05-29 RX ORDER — SULFAMETHOXAZOLE AND TRIMETHOPRIM 800; 160 MG/1; MG/1
TABLET ORAL
COMMUNITY
Start: 2020-04-20 | End: 2020-05-29

## 2020-05-29 RX ORDER — LISINOPRIL 10 MG/1
TABLET ORAL
COMMUNITY
Start: 2020-05-20 | End: 2021-02-19 | Stop reason: SDUPTHER

## 2020-05-29 RX ORDER — DICYCLOMINE HYDROCHLORIDE 10 MG/1
CAPSULE ORAL
COMMUNITY
Start: 2020-04-21 | End: 2023-01-05

## 2020-05-29 NOTE — PATIENT INSTRUCTIONS
Continue current insulin pump settings.  Always keep glucose source in case of low blood sugar  Please discuss with educators to download your Dexcom pump and insulin pump settings  Always get regular eye exam and flu vaccine  Follow-up in 3 months with labs.

## 2020-05-29 NOTE — PROGRESS NOTES
Endocrine Progress Note Outpatient     Patient Care Team:  Kishore Penn MD as PCP - General  You have chosen to receive care through a telehealth visit.  Do you consent to use a video/audio connection for your medical care today? Yes.     Chief Complaint: Follow-up diabetes: Visit conducted through audio and video conference utilizing [employer].     HPI: 48-year-old female with history of type 2 diabetes, hypothyroidism and obesity is here for follow-up.  Diabetes was diagnosed in 1991.  Currently on T slim insulin pump with Dexcom continuous glucose monitoring system.  Using NovoLog and her insulin pump.  Her current insulin pump settings are as follows: Basal rates midnight 2.0 units/h, 9 AM 3.5 units/h.  Insulin carb ratio is 1 unit 3 g of carbohydrate and insulin sensitive factor is 1 unit for each 25 g blood sugar with a target pressure of 140.    She could not tolerate Janumet in the past due to abdominal pain.  Could not tolerate Jardiance due to side effects either.  She had gastric bypass surgery 2004.  She has history of great toe amputation in 2015 and she is following with her podiatrist.    She tells me that she was off insulin pump for about 2 months due to not able to afford supplies but back on the pump now.  While she was off insulin pump she was taking Lantus and NovoLog injections.    Hypothyroidism: Currently on levothyroxine supplementation.    Obesity: Trying to work on her lifestyle.    Past Medical History:   Diagnosis Date   • Anxiety    • Asthma    • Depression    • Diabetes mellitus (CMS/HCC)    • Peripheral neuropathy    • Sleep apnea    • Thyroid disease    • Type 2 diabetes mellitus (CMS/HCC)        Social History     Socioeconomic History   • Marital status:      Spouse name: Not on file   • Number of children: Not on file   • Years of education: Not on file   • Highest education level: Not on file   Tobacco Use   • Smoking status: Never Smoker   • Smokeless tobacco:  Never Used   Substance and Sexual Activity   • Alcohol use: No     Frequency: Never   • Drug use: No   • Sexual activity: Defer       Family History   Problem Relation Age of Onset   • Cancer Mother    • Cancer Father    • Diabetes Father    • Heart disease Paternal Grandfather        No Known Allergies    ROS:   Constitutional:  Denies fatigue, tiredness.    Eyes:  Denies change in visual acuity   HENT:  Denies nasal congestion or sore throat   Respiratory: denies cough, shortness of breath.   Cardiovascular:  denies chest pain, edema   GI:  Denies abdominal pain, nausea, vomiting.   Musculoskeletal:  Denies back pain or joint pain   Integument:  Denies dry skin and rash   Neurologic:  Denies headache, focal weakness or sensory changes   Endocrine:  Denies polyuria or polydipsia   Psychiatric:  Denies depression or anxiety      There were no vitals filed for this visit.    Physical Exam:  GEN: NAD, patient looked healthy on video.  Alert and oriented and able to carry on conversation.  Mood and affect was normal.  Breathing effort was normal.      Results Review:     I reviewed the patient's new clinical results.      Lab Results   Component Value Date    GLUCOSE 151 (H) 05/21/2020    BUN 11 05/21/2020    CREATININE 0.88 05/21/2020    EGFRIFNONA 68 05/21/2020    BCR 12.5 05/21/2020    K 4.2 05/21/2020    CO2 26.3 05/21/2020    CALCIUM 8.8 05/21/2020    PROTENTOTREF 7.0 05/21/2019    ALBUMIN 3.70 05/21/2020    LABIL2 1.0 05/21/2019    AST 18 05/21/2020    ALT 17 05/21/2020    CHOL 127 05/21/2020    TRIG 138 05/21/2020    LDL 54 05/21/2020    HDL 45 05/21/2020     Labs from 8/21/2020 showed an A1c of 8.2%, TSH was 0.562, free T4 1.56, LDL 54, triglycerides 138, urine microalbumin creatinine ratio was less than 2, sodium 138, potassium 4.2, chloride 102, CO2 26, glucose 151, BUN 11 and creatinine 0.8.    Medication Review: Reviewed.       Current Outpatient Medications:   •  buPROPion XL (WELLBUTRIN XL) 300 MG 24 hr  tablet, Take 300 mg by mouth Daily., Disp: , Rfl:   •  dicyclomine (BENTYL) 10 MG capsule, , Disp: , Rfl:   •  divalproex (DEPAKOTE) 500 MG DR tablet, Take 1,500 mg by mouth Daily., Disp: , Rfl:   •  gabapentin (NEURONTIN) 300 MG capsule, Take 1 capsule by mouth 3 (Three) Times a Day., Disp: 90 capsule, Rfl: 5  •  glucagon (GLUCAGEN HYPOKIT) 1 MG injection, GLUCAGEN HYPOKIT 1 MG SOLR, Disp: , Rfl:   •  insulin aspart (NovoLOG) 100 UNIT/ML patient supplied pump, Inject  under the skin into the appropriate area as directed Continuous. Use in insulin pump, Disp: , Rfl:   •  Insulin Pump Accessories misc, INSULIN PUMP, Disp: , Rfl:   •  LANTUS 100 UNIT/ML injection, INJECT 65 UNITS UNDER THE SKIN IN CASE OF PUMP FAILURE, Disp: 20 mL, Rfl: 1  •  levothyroxine (SYNTHROID, LEVOTHROID) 200 MCG tablet, Take 200 mcg by mouth Daily., Disp: , Rfl:   •  lisinopril (PRINIVIL,ZESTRIL) 10 MG tablet, , Disp: , Rfl:   •  omeprazole (priLOSEC) 20 MG capsule, Take 20 mg by mouth Daily., Disp: , Rfl:   •  TRULICITY 0.75 MG/0.5ML solution pen-injector, INJECT 1 PEN UNDER THE SKIN INTO THE APPROPRIATE AREA AS DIRECTED EVERY 7 DAYS, Disp: 2 mL, Rfl: 2  •  ziprasidone (GEODON) 60 MG capsule, Take 60 mg by mouth Daily., Disp: , Rfl:       Assessment/Plan   1.  Diabetes mellitus type 2: Uncontrolled but is stable with A1c 8.2%.  I do not have her Dexcom download, I will have the diabetes educators reach out to her and get Dexcom download and also get her current insulin pump settings.  She is advised to always keep glucose source in case of low blood sugar.  We will make further adjustments once we have the Dexcom download and review it.  She is advised to get regular eye exam and flu vaccine.  2.  Hypothyroidism: Well-controlled, continue current dose of levothyroxine and follow TSH and free T4.  3.  Essential hypertension: Advised to check blood pressure at home and send readings for review.            Jocelyn Flores MD FACE.        EMR  "Dragon / transcription disclaimer:     \"Dictated utilizing Dragon dictation\".                 "

## 2020-06-03 DIAGNOSIS — Z79.4 TYPE 2 DIABETES MELLITUS WITH HYPERGLYCEMIA, WITH LONG-TERM CURRENT USE OF INSULIN (HCC): ICD-10-CM

## 2020-06-03 DIAGNOSIS — E11.65 TYPE 2 DIABETES MELLITUS WITH HYPERGLYCEMIA, WITH LONG-TERM CURRENT USE OF INSULIN (HCC): ICD-10-CM

## 2020-06-03 RX ORDER — INSULIN GLARGINE 100 [IU]/ML
INJECTION, SOLUTION SUBCUTANEOUS
Qty: 20 ML | Refills: 1 | Status: SHIPPED | OUTPATIENT
Start: 2020-06-03 | End: 2020-08-04

## 2020-07-20 RX ORDER — OMEPRAZOLE 20 MG/1
CAPSULE, DELAYED RELEASE ORAL
Qty: 90 CAPSULE | OUTPATIENT
Start: 2020-07-20

## 2020-08-04 DIAGNOSIS — E11.65 TYPE 2 DIABETES MELLITUS WITH HYPERGLYCEMIA, WITH LONG-TERM CURRENT USE OF INSULIN (HCC): ICD-10-CM

## 2020-08-04 DIAGNOSIS — Z79.4 TYPE 2 DIABETES MELLITUS WITH HYPERGLYCEMIA, WITH LONG-TERM CURRENT USE OF INSULIN (HCC): ICD-10-CM

## 2020-08-04 RX ORDER — INSULIN GLARGINE 100 [IU]/ML
INJECTION, SOLUTION SUBCUTANEOUS
Qty: 20 ML | Refills: 1 | Status: SHIPPED | OUTPATIENT
Start: 2020-08-04 | End: 2021-01-12 | Stop reason: SDUPTHER

## 2020-08-14 DIAGNOSIS — E66.01 CLASS 3 SEVERE OBESITY DUE TO EXCESS CALORIES WITH BODY MASS INDEX (BMI) OF 40.0 TO 44.9 IN ADULT, UNSPECIFIED WHETHER SERIOUS COMORBIDITY PRESENT (HCC): ICD-10-CM

## 2020-08-14 DIAGNOSIS — Z79.4 TYPE 2 DIABETES MELLITUS WITH HYPERGLYCEMIA, WITH LONG-TERM CURRENT USE OF INSULIN (HCC): ICD-10-CM

## 2020-08-14 DIAGNOSIS — E03.9 ACQUIRED HYPOTHYROIDISM: ICD-10-CM

## 2020-08-14 DIAGNOSIS — E11.65 TYPE 2 DIABETES MELLITUS WITH HYPERGLYCEMIA, WITH LONG-TERM CURRENT USE OF INSULIN (HCC): ICD-10-CM

## 2020-08-14 RX ORDER — DULAGLUTIDE 0.75 MG/.5ML
INJECTION, SOLUTION SUBCUTANEOUS
Qty: 2 ML | Refills: 2 | Status: SHIPPED | OUTPATIENT
Start: 2020-08-14 | End: 2020-11-18 | Stop reason: SDUPTHER

## 2020-08-18 ENCOUNTER — OFFICE VISIT (OUTPATIENT)
Dept: WOUND CARE | Facility: HOSPITAL | Age: 50
End: 2020-08-18

## 2020-08-18 PROCEDURE — G0463 HOSPITAL OUTPT CLINIC VISIT: HCPCS

## 2020-09-01 ENCOUNTER — APPOINTMENT (OUTPATIENT)
Dept: WOUND CARE | Facility: HOSPITAL | Age: 50
End: 2020-09-01

## 2020-11-18 DIAGNOSIS — E66.01 CLASS 3 SEVERE OBESITY DUE TO EXCESS CALORIES WITH BODY MASS INDEX (BMI) OF 40.0 TO 44.9 IN ADULT, UNSPECIFIED WHETHER SERIOUS COMORBIDITY PRESENT (HCC): ICD-10-CM

## 2020-11-18 DIAGNOSIS — E11.65 TYPE 2 DIABETES MELLITUS WITH HYPERGLYCEMIA, WITH LONG-TERM CURRENT USE OF INSULIN (HCC): ICD-10-CM

## 2020-11-18 DIAGNOSIS — E03.9 ACQUIRED HYPOTHYROIDISM: ICD-10-CM

## 2020-11-18 DIAGNOSIS — Z79.4 TYPE 2 DIABETES MELLITUS WITH HYPERGLYCEMIA, WITH LONG-TERM CURRENT USE OF INSULIN (HCC): ICD-10-CM

## 2020-11-19 RX ORDER — DULAGLUTIDE 0.75 MG/.5ML
1 INJECTION, SOLUTION SUBCUTANEOUS WEEKLY
Qty: 2 ML | Refills: 2 | Status: SHIPPED | OUTPATIENT
Start: 2020-11-19 | End: 2021-02-19

## 2020-12-16 ENCOUNTER — TELEPHONE (OUTPATIENT)
Dept: ENDOCRINOLOGY | Facility: CLINIC | Age: 50
End: 2020-12-16

## 2020-12-16 NOTE — TELEPHONE ENCOUNTER
On MD desk for signature and then will be faxed to [facility].  Called pt and LVM to schedule a f/u MD visit.

## 2021-01-12 DIAGNOSIS — Z79.4 TYPE 2 DIABETES MELLITUS WITH HYPERGLYCEMIA, WITH LONG-TERM CURRENT USE OF INSULIN (HCC): ICD-10-CM

## 2021-01-12 DIAGNOSIS — E11.65 TYPE 2 DIABETES MELLITUS WITH HYPERGLYCEMIA, WITH LONG-TERM CURRENT USE OF INSULIN (HCC): ICD-10-CM

## 2021-01-12 RX ORDER — INSULIN GLARGINE 100 [IU]/ML
INJECTION, SOLUTION SUBCUTANEOUS
Qty: 20 ML | Refills: 1 | Status: SHIPPED | OUTPATIENT
Start: 2021-01-12 | End: 2021-04-21 | Stop reason: SDUPTHER

## 2021-02-10 ENCOUNTER — TELEPHONE (OUTPATIENT)
Dept: ENDOCRINOLOGY | Facility: CLINIC | Age: 51
End: 2021-02-10

## 2021-02-10 DIAGNOSIS — E03.9 ACQUIRED HYPOTHYROIDISM: ICD-10-CM

## 2021-02-10 DIAGNOSIS — Z79.4 TYPE 2 DIABETES MELLITUS WITH HYPERGLYCEMIA, WITH LONG-TERM CURRENT USE OF INSULIN (HCC): Primary | ICD-10-CM

## 2021-02-10 DIAGNOSIS — E11.65 TYPE 2 DIABETES MELLITUS WITH HYPERGLYCEMIA, WITH LONG-TERM CURRENT USE OF INSULIN (HCC): Primary | ICD-10-CM

## 2021-02-10 DIAGNOSIS — I10 ESSENTIAL HYPERTENSION: ICD-10-CM

## 2021-02-12 ENCOUNTER — LAB (OUTPATIENT)
Dept: LAB | Facility: HOSPITAL | Age: 51
End: 2021-02-12

## 2021-02-12 DIAGNOSIS — Z79.4 TYPE 2 DIABETES MELLITUS WITH HYPERGLYCEMIA, WITH LONG-TERM CURRENT USE OF INSULIN (HCC): ICD-10-CM

## 2021-02-12 DIAGNOSIS — I10 ESSENTIAL HYPERTENSION: ICD-10-CM

## 2021-02-12 DIAGNOSIS — E11.65 TYPE 2 DIABETES MELLITUS WITH HYPERGLYCEMIA, WITH LONG-TERM CURRENT USE OF INSULIN (HCC): ICD-10-CM

## 2021-02-12 DIAGNOSIS — E03.9 ACQUIRED HYPOTHYROIDISM: ICD-10-CM

## 2021-02-12 LAB
ALBUMIN SERPL-MCNC: 4 G/DL (ref 3.5–5.2)
ALBUMIN UR-MCNC: <1.2 MG/DL
ALBUMIN/GLOB SERPL: 1.6 G/DL
ALP SERPL-CCNC: 89 U/L (ref 39–117)
ALT SERPL W P-5'-P-CCNC: 16 U/L (ref 1–33)
ANION GAP SERPL CALCULATED.3IONS-SCNC: 7.9 MMOL/L (ref 5–15)
AST SERPL-CCNC: 15 U/L (ref 1–32)
BILIRUB SERPL-MCNC: 0.2 MG/DL (ref 0–1.2)
BUN SERPL-MCNC: 12 MG/DL (ref 6–20)
BUN/CREAT SERPL: 12.8 (ref 7–25)
CALCIUM SPEC-SCNC: 9.1 MG/DL (ref 8.6–10.5)
CHLORIDE SERPL-SCNC: 102 MMOL/L (ref 98–107)
CHOLEST SERPL-MCNC: 137 MG/DL (ref 0–200)
CO2 SERPL-SCNC: 27.1 MMOL/L (ref 22–29)
CREAT SERPL-MCNC: 0.94 MG/DL (ref 0.57–1)
CREAT UR-MCNC: 67.6 MG/DL
GFR SERPL CREATININE-BSD FRML MDRD: 63 ML/MIN/1.73
GLOBULIN UR ELPH-MCNC: 2.5 GM/DL
GLUCOSE SERPL-MCNC: 224 MG/DL (ref 65–99)
HBA1C MFR BLD: 8 % (ref 3.5–5.6)
HDLC SERPL-MCNC: 48 MG/DL (ref 40–60)
LDLC SERPL CALC-MCNC: 61 MG/DL (ref 0–100)
LDLC/HDLC SERPL: 1.15 {RATIO}
MICROALBUMIN/CREAT UR: NORMAL MG/G{CREAT}
POTASSIUM SERPL-SCNC: 4.5 MMOL/L (ref 3.5–5.2)
PROT SERPL-MCNC: 6.5 G/DL (ref 6–8.5)
SODIUM SERPL-SCNC: 137 MMOL/L (ref 136–145)
T4 FREE SERPL-MCNC: 1.2 NG/DL (ref 0.93–1.7)
TRIGL SERPL-MCNC: 169 MG/DL (ref 0–150)
TSH SERPL DL<=0.05 MIU/L-ACNC: 2.21 UIU/ML (ref 0.27–4.2)
VLDLC SERPL-MCNC: 28 MG/DL (ref 5–40)

## 2021-02-12 PROCEDURE — 36415 COLL VENOUS BLD VENIPUNCTURE: CPT

## 2021-02-12 PROCEDURE — 82043 UR ALBUMIN QUANTITATIVE: CPT

## 2021-02-12 PROCEDURE — 80061 LIPID PANEL: CPT

## 2021-02-12 PROCEDURE — 83036 HEMOGLOBIN GLYCOSYLATED A1C: CPT

## 2021-02-12 PROCEDURE — 80053 COMPREHEN METABOLIC PANEL: CPT

## 2021-02-12 PROCEDURE — 84443 ASSAY THYROID STIM HORMONE: CPT

## 2021-02-12 PROCEDURE — 84439 ASSAY OF FREE THYROXINE: CPT

## 2021-02-12 PROCEDURE — 82570 ASSAY OF URINE CREATININE: CPT

## 2021-02-19 ENCOUNTER — OFFICE VISIT (OUTPATIENT)
Dept: ENDOCRINOLOGY | Facility: CLINIC | Age: 51
End: 2021-02-19

## 2021-02-19 ENCOUNTER — TELEPHONE (OUTPATIENT)
Dept: ENDOCRINOLOGY | Facility: CLINIC | Age: 51
End: 2021-02-19

## 2021-02-19 VITALS
WEIGHT: 293 LBS | HEART RATE: 77 BPM | BODY MASS INDEX: 44.41 KG/M2 | DIASTOLIC BLOOD PRESSURE: 100 MMHG | TEMPERATURE: 97.3 F | SYSTOLIC BLOOD PRESSURE: 165 MMHG | HEIGHT: 68 IN | OXYGEN SATURATION: 98 %

## 2021-02-19 DIAGNOSIS — Z79.4 TYPE 2 DIABETES MELLITUS WITH HYPERGLYCEMIA, WITH LONG-TERM CURRENT USE OF INSULIN (HCC): Primary | ICD-10-CM

## 2021-02-19 DIAGNOSIS — E11.65 TYPE 2 DIABETES MELLITUS WITH HYPERGLYCEMIA, WITH LONG-TERM CURRENT USE OF INSULIN (HCC): Primary | ICD-10-CM

## 2021-02-19 DIAGNOSIS — I10 ESSENTIAL HYPERTENSION: ICD-10-CM

## 2021-02-19 DIAGNOSIS — E03.9 ACQUIRED HYPOTHYROIDISM: ICD-10-CM

## 2021-02-19 PROBLEM — J45.909 ASTHMA: Status: ACTIVE | Noted: 2021-02-19

## 2021-02-19 PROBLEM — F31.9: Status: ACTIVE | Noted: 2021-02-19

## 2021-02-19 PROBLEM — E11.9 TYPE 2 DIABETES MELLITUS (HCC): Status: ACTIVE | Noted: 2021-02-19

## 2021-02-19 PROBLEM — F32.A DEPRESSIVE DISORDER: Status: ACTIVE | Noted: 2021-02-19

## 2021-02-19 PROBLEM — G62.9 NEUROPATHY: Status: ACTIVE | Noted: 2021-02-19

## 2021-02-19 PROBLEM — F41.1 ANXIETY STATE: Status: ACTIVE | Noted: 2021-02-19

## 2021-02-19 PROBLEM — G47.33 OBSTRUCTIVE SLEEP APNEA SYNDROME: Status: ACTIVE | Noted: 2021-02-19

## 2021-02-19 PROBLEM — E66.01 MORBID OBESITY (HCC): Status: ACTIVE | Noted: 2021-02-19

## 2021-02-19 LAB — GLUCOSE BLDC GLUCOMTR-MCNC: 282 MG/DL (ref 70–105)

## 2021-02-19 PROCEDURE — 99214 OFFICE O/P EST MOD 30 MIN: CPT | Performed by: INTERNAL MEDICINE

## 2021-02-19 PROCEDURE — 82962 GLUCOSE BLOOD TEST: CPT | Performed by: INTERNAL MEDICINE

## 2021-02-19 RX ORDER — SULFAMETHOXAZOLE AND TRIMETHOPRIM 800; 160 MG/1; MG/1
TABLET ORAL
COMMUNITY
Start: 2021-01-12 | End: 2021-02-19

## 2021-02-19 RX ORDER — LISINOPRIL 10 MG/1
20 TABLET ORAL DAILY
Qty: 30 TABLET | Refills: 6 | Status: SHIPPED | OUTPATIENT
Start: 2021-02-19 | End: 2021-06-01

## 2021-02-19 RX ORDER — CEPHALEXIN 500 MG/1
CAPSULE ORAL
COMMUNITY
Start: 2021-01-04 | End: 2021-02-19

## 2021-02-19 NOTE — PROGRESS NOTES
Endocrine Progress Note Outpatient     Patient Care Team:  Kishore Penn MD as PCP - General      Chief Complaint: Follow-up diabetes:     HPI: 50 year-old female with history of type 2 diabetes, hypothyroidism and obesity is here for follow-up.  Diabetes was diagnosed in 1991.  Currently on T slim insulin pump with Dexcom continuous glucose monitoring system.  Using NovoLog and her insulin pump.  Her current insulin pump settings are as follows: Basal rates midnight 2.0 units/h, 9 AM 3.5 units/h.  Insulin carb ratio is 1 unit 3 g of carbohydrate and insulin sensitive factor is 1 unit for each 25 g blood sugar with a target pressure of 140.  Active insulin 4 hours.    She could not tolerate Janumet in the past due to abdominal pain.  Could not tolerate Jardiance due to side effects either.  We did prescribe Trulicity but is too expensive. She had gastric bypass surgery 2004.  She has history of left great toe amputation in 2015 and she is following with her podiatrist.    Hypertension: BP high today.       Hypothyroidism: Currently on levothyroxine supplementation.    Obesity: Trying to work on her lifestyle.    Past Medical History:   Diagnosis Date   • Anxiety    • Asthma    • Bruise     On stomach for 3 months   • Depression    • Diabetes mellitus (CMS/HCC)    • Peripheral neuropathy    • Sleep apnea    • Thyroid disease    • Type 2 diabetes mellitus (CMS/HCC)        Social History     Socioeconomic History   • Marital status:      Spouse name: Not on file   • Number of children: Not on file   • Years of education: Not on file   • Highest education level: Not on file   Tobacco Use   • Smoking status: Never Smoker   • Smokeless tobacco: Never Used   Substance and Sexual Activity   • Alcohol use: No     Frequency: Never   • Drug use: No   • Sexual activity: Defer       Family History   Problem Relation Age of Onset   • Cancer Mother    • Cancer Father    • Diabetes Father    • Heart disease  Paternal Grandfather        Allergies   Allergen Reactions   • Dust Mite Extract Unknown - Low Severity   • Molds & Smuts Unknown - Low Severity       ROS:   Constitutional:  Denies fatigue, tiredness.    Eyes:  Denies change in visual acuity   HENT:  Denies nasal congestion or sore throat   Respiratory: denies cough, shortness of breath.   Cardiovascular:  denies chest pain, edema   GI:  Denies abdominal pain, nausea, vomiting.   Musculoskeletal:  Denies back pain or joint pain   Integument:  Denies dry skin and rash   Neurologic:  Denies headache, focal weakness or sensory changes   Endocrine:  Denies polyuria or polydipsia   Psychiatric:  Denies depression or anxiety      Vitals:    02/19/21 1018   BP: 165/100   Pulse: 77   Temp: 97.3 °F (36.3 °C)   SpO2: 98%       Physical Exam:  GEN: NAD, conversant, obese  EYES: EOMI, PERRL, no conjunctival erythema  NECK: no thyromegaly, full ROM   CV: RRR, no murmurs/rubs/gallops, no peripheral edema  LUNG: CTAB, no wheezes/rales/ronchi  SKIN: no rashes, no acanthosis  MSK: no deformities, full ROM of all extremities  NEURO: no tremors, DTR normal  PSYCH: AOX3, appropriate mood, affect normal      Results Review:     I reviewed the patient's new clinical results.      Lab Results   Component Value Date    GLUCOSE 224 (H) 02/12/2021    BUN 12 02/12/2021    CREATININE 0.94 02/12/2021    EGFRIFNONA 63 02/12/2021    BCR 12.8 02/12/2021    K 4.5 02/12/2021    CO2 27.1 02/12/2021    CALCIUM 9.1 02/12/2021    PROTENTOTREF 7.0 05/21/2019    ALBUMIN 4.00 02/12/2021    LABIL2 1.0 05/21/2019    AST 15 02/12/2021    ALT 16 02/12/2021    CHOL 137 02/12/2021    TRIG 169 (H) 02/12/2021    LDL 61 02/12/2021    HDL 48 02/12/2021     Labs from February 12, 2021 showed a urine microalbumin creatinine ratio was less than 2, TSH 2.2, free T4 1.2, A1c 8%, LDL 61 and triglycerides 169.    Dexcom download interpretation: Dates covered was February 6, 2021 to February 19, 2021.  Average blood sugar  212 with a standard deviation of 62.  She is spending 29% in the range and 71% time above range.  She is using sensor 79% the time.  Her glucose graph is above 150 most of the time.      Medication Review: Reviewed.       Current Outpatient Medications:   •  buPROPion XL (WELLBUTRIN XL) 300 MG 24 hr tablet, Take 300 mg by mouth Daily., Disp: , Rfl:   •  dicyclomine (BENTYL) 10 MG capsule, , Disp: , Rfl:   •  divalproex (DEPAKOTE) 500 MG DR tablet, Take 1,500 mg by mouth Daily., Disp: , Rfl:   •  Dulaglutide (Trulicity) 0.75 MG/0.5ML solution pen-injector, Inject 0.75 mg into the appropriate area of the skin as directed by provider 1 (One) Time Per Week., Disp: 2 mL, Rfl: 2  •  gabapentin (NEURONTIN) 300 MG capsule, Take 1 capsule by mouth 3 (Three) Times a Day., Disp: 90 capsule, Rfl: 5  •  glucagon (GLUCAGEN HYPOKIT) 1 MG injection, GLUCAGEN HYPOKIT 1 MG SOLR, Disp: , Rfl:   •  insulin aspart (NovoLOG) 100 UNIT/ML patient supplied pump, Inject  under the skin into the appropriate area as directed Continuous. Use in insulin pump, Disp: , Rfl:   •  Insulin Pump Accessories misc, INSULIN PUMP, Disp: , Rfl:   •  Lantus 100 UNIT/ML injection, Inject 65 units under the skin as directed in case of pump failure, Disp: 20 mL, Rfl: 1  •  levothyroxine (SYNTHROID, LEVOTHROID) 200 MCG tablet, Take 200 mcg by mouth Daily., Disp: , Rfl:   •  lisinopril (PRINIVIL,ZESTRIL) 10 MG tablet, , Disp: , Rfl:   •  omeprazole (priLOSEC) 20 MG capsule, Take 20 mg by mouth Daily., Disp: , Rfl:   •  ziprasidone (GEODON) 60 MG capsule, Take 60 mg by mouth Daily., Disp: , Rfl:       Assessment/Plan   1.  Diabetes mellitus type 2: Uncontrolled with high A1c and high blood sugar.  I reviewed her Dexcom report.  We changed her basal rate as follows midnight at 2.2 units/h, 6 AM is 4.0 units/h.  Will DC Trulicity due to cost.  I also change insulin correction factor to 1 unit for each 20 mg blood sugar.  She is advised to continue to work on diet  "and activity and always keep glucose source in case of low blood sugar.      2.  Hypothyroidism: Well-controlled, continue current dose of levothyroxine and follow TSH and free T4.    3.  Essential hypertension: Uncontrolled with high blood pressure, will increase lisinopril to 20 mg p.o. daily and follow blood pressure.    She has a bruise on the abdominal wall due to Dexcom, she has seen a surgeon as well as primary care physician.  Has induration, advised her to wait and watch maybe use some hot and cold compresses.  Does not look infected at this time. Follow with surgery if gets worse.               Jocelyn Flores MD FACE.        EMR Dragon / transcription disclaimer:     \"Dictated utilizing Dragon dictation\".                 "

## 2021-02-19 NOTE — PATIENT INSTRUCTIONS
Increase lisinopril to 20 mg p.o. daily  Continue rest of the medication  Always keep glucose source in case of low blood sugar  Annual eye exam and flu vaccine  Labs before follow-up.

## 2021-04-20 DIAGNOSIS — Z79.4 TYPE 2 DIABETES MELLITUS WITH HYPERGLYCEMIA, WITH LONG-TERM CURRENT USE OF INSULIN (HCC): ICD-10-CM

## 2021-04-20 DIAGNOSIS — E11.65 TYPE 2 DIABETES MELLITUS WITH HYPERGLYCEMIA, WITH LONG-TERM CURRENT USE OF INSULIN (HCC): ICD-10-CM

## 2021-04-21 RX ORDER — INSULIN GLARGINE 100 [IU]/ML
INJECTION, SOLUTION SUBCUTANEOUS
Qty: 20 ML | Refills: 1 | Status: SHIPPED | OUTPATIENT
Start: 2021-04-21 | End: 2021-06-07

## 2021-06-01 RX ORDER — LISINOPRIL 10 MG/1
20 TABLET ORAL DAILY
Qty: 180 TABLET | Refills: 3 | Status: SHIPPED | OUTPATIENT
Start: 2021-06-01 | End: 2023-01-05

## 2021-06-06 DIAGNOSIS — Z79.4 TYPE 2 DIABETES MELLITUS WITH HYPERGLYCEMIA, WITH LONG-TERM CURRENT USE OF INSULIN (HCC): ICD-10-CM

## 2021-06-06 DIAGNOSIS — E11.65 TYPE 2 DIABETES MELLITUS WITH HYPERGLYCEMIA, WITH LONG-TERM CURRENT USE OF INSULIN (HCC): ICD-10-CM

## 2021-06-07 RX ORDER — INSULIN GLARGINE 100 [IU]/ML
INJECTION, SOLUTION SUBCUTANEOUS
Qty: 20 ML | Refills: 3 | Status: SHIPPED | OUTPATIENT
Start: 2021-06-07

## 2021-09-18 ENCOUNTER — TELEMEDICINE (OUTPATIENT)
Dept: FAMILY MEDICINE CLINIC | Facility: TELEHEALTH | Age: 51
End: 2021-09-18

## 2021-09-18 DIAGNOSIS — B37.9 ANTIBIOTIC-INDUCED YEAST INFECTION: Primary | ICD-10-CM

## 2021-09-18 DIAGNOSIS — T36.95XA ANTIBIOTIC-INDUCED YEAST INFECTION: Primary | ICD-10-CM

## 2021-09-18 PROCEDURE — 99202 OFFICE O/P NEW SF 15 MIN: CPT | Performed by: NURSE PRACTITIONER

## 2021-09-18 RX ORDER — FLUCONAZOLE 150 MG/1
150 TABLET ORAL
Qty: 3 TABLET | Refills: 0 | Status: SHIPPED | OUTPATIENT
Start: 2021-09-18 | End: 2021-09-25

## 2021-09-18 NOTE — PROGRESS NOTES
Subjective   Marilou Abbott is a 50 y.o. female.     She was on antibiotics last week for a UTI. She is having white discharge that looks like cottage cheese, burning and itching which has been going on for about 4-5 days. She tried AZO yeast infection medication with no results. She does not get yeast infections frequently, but she does sometimes after antibiotics.       The following portions of the patient's history were reviewed and updated as appropriate: allergies, current medications, past family history, past medical history, past social history, past surgical history and problem list.    Review of Systems   Gastrointestinal: Negative for abdominal pain.   Genitourinary: Positive for vaginal discharge and vaginal pain. Negative for decreased urine volume, dysuria, flank pain, frequency, genital sores, hematuria, menstrual problem, pelvic pain, pelvic pressure, urgency and vaginal bleeding.   Musculoskeletal: Negative for back pain.       Objective   Physical Exam  Constitutional:       General: She is not in acute distress.     Appearance: She is well-developed. She is not diaphoretic.   Pulmonary:      Effort: Pulmonary effort is normal.   Neurological:      Mental Status: She is alert and oriented to person, place, and time.   Psychiatric:         Behavior: Behavior normal.           Assessment/Plan   Diagnoses and all orders for this visit:    1. Antibiotic-induced yeast infection (Primary)    Other orders  -     fluconazole (Diflucan) 150 MG tablet; Take 1 tablet by mouth Every 72 (Seventy-Two) Hours for 3 doses.  Dispense: 3 tablet; Refill: 0               The use of a video visit has been reviewed with the patient and verbal informed consent has been obtained. Myself and Marilou Abbott participated in this visit. The patient is located in [place], [place]. I am located in [place], [place]. Mychart and Zoom were utilized. I spent 12 minutes in the patient's chart for this visit.

## 2021-09-18 NOTE — PATIENT INSTRUCTIONS
Do not have to take all 3 doses of diflucan, just as many as is necessary for symptoms to resolve  Follow up with women's health if symptoms persist or recur.      Vaginal Yeast Infection, Adult    Vaginal yeast infection is a condition that causes vaginal discharge as well as soreness, swelling, and redness (inflammation) of the vagina. This is a common condition. Some women get this infection frequently.  What are the causes?  This condition is caused by a change in the normal balance of the yeast (candida) and bacteria that live in the vagina. This change causes an overgrowth of yeast, which causes the inflammation.  What increases the risk?  The condition is more likely to develop in women who:  · Take antibiotic medicines.  · Have diabetes.  · Take birth control pills.  · Are pregnant.  · Douche often.  · Have a weak body defense system (immune system).  · Have been taking steroid medicines for a long time.  · Frequently wear tight clothing.  What are the signs or symptoms?  Symptoms of this condition include:  · White, thick, creamy vaginal discharge.  · Swelling, itching, redness, and irritation of the vagina. The lips of the vagina (vulva) may be affected as well.  · Pain or a burning feeling while urinating.  · Pain during sex.  How is this diagnosed?  This condition is diagnosed based on:  · Your medical history.  · A physical exam.  · A pelvic exam. Your health care provider will examine a sample of your vaginal discharge under a microscope. Your health care provider may send this sample for testing to confirm the diagnosis.  How is this treated?  This condition is treated with medicine. Medicines may be over-the-counter or prescription. You may be told to use one or more of the following:  · Medicine that is taken by mouth (orally).  · Medicine that is applied as a cream (topically).  · Medicine that is inserted directly into the vagina (suppository).  Follow these instructions at  home:    Lifestyle  · Do not have sex until your health care provider approves. Tell your sex partner that you have a yeast infection. That person should go to his or her health care provider and ask if they should also be treated.  · Do not wear tight clothes, such as pantyhose or tight pants.  · Wear breathable cotton underwear.  General instructions  · Take or apply over-the-counter and prescription medicines only as told by your health care provider.  · Eat more yogurt. This may help to keep your yeast infection from returning.  · Do not use tampons until your health care provider approves.  · Try taking a sitz bath to help with discomfort. This is a warm water bath that is taken while you are sitting down. The water should only come up to your hips and should cover your buttocks. Do this 3-4 times per day or as told by your health care provider.  · Do not douche.  · If you have diabetes, keep your blood sugar levels under control.  · Keep all follow-up visits as told by your health care provider. This is important.  Contact a health care provider if:  · You have a fever.  · Your symptoms go away and then return.  · Your symptoms do not get better with treatment.  · Your symptoms get worse.  · You have new symptoms.  · You develop blisters in or around your vagina.  · You have blood coming from your vagina and it is not your menstrual period.  · You develop pain in your abdomen.  Summary  · Vaginal yeast infection is a condition that causes discharge as well as soreness, swelling, and redness (inflammation) of the vagina.  · This condition is treated with medicine. Medicines may be over-the-counter or prescription.  · Take or apply over-the-counter and prescription medicines only as told by your health care provider.  · Do not douche. Do not have sex or use tampons until your health care provider approves.  · Contact a health care provider if your symptoms do not get better with treatment or your symptoms go  away and then return.  This information is not intended to replace advice given to you by your health care provider. Make sure you discuss any questions you have with your health care provider.  Document Revised: 07/17/2020 Document Reviewed: 05/06/2019  [employer] Patient Education © 2021 [employer]  Fluconazole tablets  What is this medicine?  FLUCONAZOLE (floo MURIEL na zole) is an antifungal medicine. It is used to treat certain kinds of fungal or yeast infections.  This medicine may be used for other purposes; ask your health care provider or pharmacist if you have questions.  COMMON BRAND NAME(S): Diflucan  What should I tell my health care provider before I take this medicine?  They need to know if you have any of these conditions:  · history of irregular heart beat  · kidney disease  · an unusual or allergic reaction to fluconazole, other azole antifungals, medicines, foods, dyes, or preservatives  · pregnant or trying to get pregnant  · breast-feeding  How should I use this medicine?  Take this medicine by mouth. Follow the directions on the prescription label. Do not take your medicine more often than directed.  Talk to your pediatrician regarding the use of this medicine in children. Special care may be needed. This medicine has been used in children as young as 6 months of age.  Overdosage: If you think you have taken too much of this medicine contact a poison control center or emergency room at once.  NOTE: This medicine is only for you. Do not share this medicine with others.  What if I miss a dose?  If you miss a dose, take it as soon as you can. If it is almost time for your next dose, take only that dose. Do not take double or extra doses.  What may interact with this medicine?  Do not take this medicine with any of the following medications:  · astemizole  · certain medicines for irregular heart beat like dronedarone, quinidine  · cisapride  · erythromycin  · lomitapide  · other medicines that  prolong the QT interval (cause an abnormal heart rhythm) like encorafenib, lapatinib  · pimozide  · terfenadine  · thioridazine  This medicine may also interact with the following medications:  · antiviral medicines for HIV or AIDS  · birth control pills  · certain antibiotics like rifabutin, rifampin  · certain medicines for blood pressure like amlodipine, isradipine, felodipine, hydrochlorothiazide, losartan, nifedipine  · certain medicines for cancer like cyclophosphamide, ibrutinib, vinblastine, vincristine  · certain medicines for cholesterol like atorvastatin, lovastatin, fluvastatin, simvastatin  · certain medicines for depression, anxiety, or psychotic disturbances like amitriptyline, midazolam, nortriptyline, triazolam  · certain medicines for diabetes like glipizide, glyburide, tolbutamide  · certain medicines for pain like alfentanil, fentanyl, methadone  · certain medicines for seizures like carbamazepine, phenytoin  · certain medicines that treat or prevent blood clots like warfarin  · dofetilide  · halofantrine  · medicines that lower your chance of fighting infection like cyclosporine, prednisone, tacrolimus  · NSAIDS, medicines for pain and inflammation, like celecoxib, diclofenac, flurbiprofen, ibuprofen, meloxicam, naproxen  · other medicines for fungal infections  · sirolimus  · theophylline  · tofacitinib  · tolvaptan  · ziprasidone  This list may not describe all possible interactions. Give your health care provider a list of all the medicines, herbs, non-prescription drugs, or dietary supplements you use. Also tell them if you smoke, drink alcohol, or use illegal drugs. Some items may interact with your medicine.  What should I watch for while using this medicine?  Visit your doctor or health care professional for regular checkups. If you are taking this medicine for a long time you may need blood work. Tell your doctor if your symptoms do not improve. Some fungal infections need many weeks or  months of treatment to cure.  Alcohol can increase possible damage to your liver. Avoid alcoholic drinks.  If you have a vaginal infection, do not have sex until you have finished your treatment. You can wear a sanitary napkin. Do not use tampons. Wear freshly washed cotton, not synthetic, panties.  What side effects may I notice from receiving this medicine?  Side effects that you should report to your doctor or health care professional as soon as possible:  · allergic reactions like skin rash or itching, hives, swelling of the lips, mouth, tongue, or throat  · dark urine  · feeling dizzy or faint  · irregular heartbeat or chest pain  · redness, blistering, peeling or loosening of the skin, including inside the mouth  · trouble breathing  · unusual bruising or bleeding  · vomiting  · yellowing of the eyes or skin  Side effects that usually do not require medical attention (report to your doctor or health care professional if they continue or are bothersome):  · changes in how food tastes  · diarrhea  · headache  · stomach upset or nausea  This list may not describe all possible side effects. Call your doctor for medical advice about side effects. You may report side effects to FDA at 0-809-FDA-3032.  Where should I keep my medicine?  Keep out of the reach of children.  Store at room temperature below 30 degrees C (86 degrees F). Throw away any medicine after the expiration date.  NOTE: This sheet is a summary. It may not cover all possible information. If you have questions about this medicine, talk to your doctor, pharmacist, or health care provider.  © 2021 [employer] (2020-11-17 14:37:10)

## 2021-10-11 DIAGNOSIS — Z79.4 TYPE 2 DIABETES MELLITUS WITH HYPERGLYCEMIA, WITH LONG-TERM CURRENT USE OF INSULIN (HCC): ICD-10-CM

## 2021-10-11 DIAGNOSIS — E11.65 TYPE 2 DIABETES MELLITUS WITH HYPERGLYCEMIA, WITH LONG-TERM CURRENT USE OF INSULIN (HCC): ICD-10-CM

## 2021-10-11 RX ORDER — INSULIN GLARGINE 100 [IU]/ML
INJECTION, SOLUTION SUBCUTANEOUS
Qty: 20 ML | Refills: 3 | OUTPATIENT
Start: 2021-10-11

## 2023-07-20 NOTE — TELEPHONE ENCOUNTER
Has lab appt on Friday. Orders entered. Please advise if you want anything different.   Attending Attestation (For Attendings USE Only)...

## 2024-05-13 ENCOUNTER — HOSPITAL ENCOUNTER (OUTPATIENT)
Dept: ULTRASOUND IMAGING | Facility: HOSPITAL | Age: 54
Discharge: HOME | End: 2024-05-13
Admitting: OBSTETRICS & GYNECOLOGY
Payer: COMMERCIAL

## 2024-05-13 DIAGNOSIS — N85.01 SIMPLE ENDOMETRIAL HYPERPLASIA WITHOUT ATYPIA: ICD-10-CM

## 2024-05-13 PROCEDURE — 76830 TRANSVAGINAL US NON-OB: CPT

## 2024-05-13 PROCEDURE — 76856 US EXAM PELVIC COMPLETE: CPT

## 2024-06-01 ENCOUNTER — APPOINTMENT (OUTPATIENT)
Dept: GENERAL RADIOLOGY | Facility: HOSPITAL | Age: 54
End: 2024-06-01
Payer: COMMERCIAL

## 2024-06-01 ENCOUNTER — APPOINTMENT (OUTPATIENT)
Dept: CARDIOLOGY | Facility: HOSPITAL | Age: 54
End: 2024-06-01
Payer: COMMERCIAL

## 2024-06-01 ENCOUNTER — HOSPITAL ENCOUNTER (EMERGENCY)
Facility: HOSPITAL | Age: 54
Discharge: HOME | End: 2024-06-01
Payer: COMMERCIAL

## 2024-06-01 VITALS
TEMPERATURE: 98.5 F | DIASTOLIC BLOOD PRESSURE: 61 MMHG | BODY MASS INDEX: 44.41 KG/M2 | SYSTOLIC BLOOD PRESSURE: 123 MMHG | HEIGHT: 68 IN | HEART RATE: 73 BPM | WEIGHT: 293 LBS | OXYGEN SATURATION: 97 % | RESPIRATION RATE: 16 BRPM

## 2024-06-01 DIAGNOSIS — L03.032 CELLULITIS OF SECOND TOE OF LEFT FOOT: Primary | ICD-10-CM

## 2024-06-01 LAB
ALBUMIN SERPL-MCNC: 4 G/DL (ref 3.5–5.2)
ALBUMIN/GLOB SERPL: 1.3 G/DL
ALP SERPL-CCNC: 118 U/L (ref 39–117)
ALT SERPL W P-5'-P-CCNC: 13 U/L (ref 1–33)
ANION GAP SERPL CALCULATED.3IONS-SCNC: 11.7 MMOL/L (ref 5–15)
AST SERPL-CCNC: 20 U/L (ref 1–32)
BASOPHILS # BLD AUTO: 0.04 10*3/MM3 (ref 0–0.2)
BASOPHILS NFR BLD AUTO: 0.6 % (ref 0–1.5)
BH CV LOWER VASCULAR LEFT COMMON FEMORAL AUGMENT: NORMAL
BH CV LOWER VASCULAR LEFT COMMON FEMORAL COMPETENT: NORMAL
BH CV LOWER VASCULAR LEFT COMMON FEMORAL COMPRESS: NORMAL
BH CV LOWER VASCULAR LEFT COMMON FEMORAL PHASIC: NORMAL
BH CV LOWER VASCULAR LEFT COMMON FEMORAL SPONT: NORMAL
BH CV LOWER VASCULAR RIGHT COMMON FEMORAL AUGMENT: NORMAL
BH CV LOWER VASCULAR RIGHT COMMON FEMORAL COMPETENT: NORMAL
BH CV LOWER VASCULAR RIGHT COMMON FEMORAL COMPRESS: NORMAL
BH CV LOWER VASCULAR RIGHT COMMON FEMORAL PHASIC: NORMAL
BH CV LOWER VASCULAR RIGHT COMMON FEMORAL SPONT: NORMAL
BH CV LOWER VASCULAR RIGHT DISTAL FEMORAL COMPRESS: NORMAL
BH CV LOWER VASCULAR RIGHT GASTRONEMIUS COMPRESS: NORMAL
BH CV LOWER VASCULAR RIGHT GREATER SAPH AK COMPRESS: NORMAL
BH CV LOWER VASCULAR RIGHT GREATER SAPH BK COMPRESS: NORMAL
BH CV LOWER VASCULAR RIGHT LESSER SAPH COMPRESS: NORMAL
BH CV LOWER VASCULAR RIGHT MID FEMORAL AUGMENT: NORMAL
BH CV LOWER VASCULAR RIGHT MID FEMORAL COMPETENT: NORMAL
BH CV LOWER VASCULAR RIGHT MID FEMORAL COMPRESS: NORMAL
BH CV LOWER VASCULAR RIGHT MID FEMORAL PHASIC: NORMAL
BH CV LOWER VASCULAR RIGHT MID FEMORAL SPONT: NORMAL
BH CV LOWER VASCULAR RIGHT PERONEAL COMPRESS: NORMAL
BH CV LOWER VASCULAR RIGHT POPLITEAL AUGMENT: NORMAL
BH CV LOWER VASCULAR RIGHT POPLITEAL COMPETENT: NORMAL
BH CV LOWER VASCULAR RIGHT POPLITEAL COMPRESS: NORMAL
BH CV LOWER VASCULAR RIGHT POPLITEAL PHASIC: NORMAL
BH CV LOWER VASCULAR RIGHT POPLITEAL SPONT: NORMAL
BH CV LOWER VASCULAR RIGHT POSTERIOR TIBIAL COMPRESS: NORMAL
BH CV LOWER VASCULAR RIGHT PROXIMAL FEMORAL COMPRESS: NORMAL
BH CV LOWER VASCULAR RIGHT SAPHENOFEMORAL JUNCTION COMPRESS: NORMAL
BH CV VAS PRELIMINARY FINDINGS SCRIPTING: 1
BILIRUB SERPL-MCNC: 0.4 MG/DL (ref 0–1.2)
BUN SERPL-MCNC: 13 MG/DL (ref 6–20)
BUN/CREAT SERPL: 14.4 (ref 7–25)
CALCIUM SPEC-SCNC: 9.2 MG/DL (ref 8.6–10.5)
CHLORIDE SERPL-SCNC: 101 MMOL/L (ref 98–107)
CO2 SERPL-SCNC: 22.3 MMOL/L (ref 22–29)
CREAT SERPL-MCNC: 0.9 MG/DL (ref 0.57–1)
D-LACTATE SERPL-SCNC: 2.1 MMOL/L (ref 0.3–2)
D-LACTATE SERPL-SCNC: 2.6 MMOL/L (ref 0.5–2)
DEPRECATED RDW RBC AUTO: 47 FL (ref 37–54)
EGFRCR SERPLBLD CKD-EPI 2021: 76.6 ML/MIN/1.73
EOSINOPHIL # BLD AUTO: 0.2 10*3/MM3 (ref 0–0.4)
EOSINOPHIL NFR BLD AUTO: 2.8 % (ref 0.3–6.2)
ERYTHROCYTE [DISTWIDTH] IN BLOOD BY AUTOMATED COUNT: 13.9 % (ref 12.3–15.4)
GLOBULIN UR ELPH-MCNC: 3.1 GM/DL
GLUCOSE SERPL-MCNC: 348 MG/DL (ref 65–99)
HCT VFR BLD AUTO: 37.2 % (ref 34–46.6)
HGB BLD-MCNC: 11.8 G/DL (ref 12–15.9)
IMM GRANULOCYTES # BLD AUTO: 0.05 10*3/MM3 (ref 0–0.05)
IMM GRANULOCYTES NFR BLD AUTO: 0.7 % (ref 0–0.5)
LYMPHOCYTES # BLD AUTO: 2.24 10*3/MM3 (ref 0.7–3.1)
LYMPHOCYTES NFR BLD AUTO: 31.7 % (ref 19.6–45.3)
MCH RBC QN AUTO: 29.4 PG (ref 26.6–33)
MCHC RBC AUTO-ENTMCNC: 31.7 G/DL (ref 31.5–35.7)
MCV RBC AUTO: 92.8 FL (ref 79–97)
MONOCYTES # BLD AUTO: 0.5 10*3/MM3 (ref 0.1–0.9)
MONOCYTES NFR BLD AUTO: 7.1 % (ref 5–12)
NEUTROPHILS NFR BLD AUTO: 4.04 10*3/MM3 (ref 1.7–7)
NEUTROPHILS NFR BLD AUTO: 57.1 % (ref 42.7–76)
NRBC BLD AUTO-RTO: 0 /100 WBC (ref 0–0.2)
PLATELET # BLD AUTO: 249 10*3/MM3 (ref 140–450)
PMV BLD AUTO: 11.2 FL (ref 6–12)
POTASSIUM SERPL-SCNC: 4.9 MMOL/L (ref 3.5–5.2)
PROT SERPL-MCNC: 7.1 G/DL (ref 6–8.5)
RBC # BLD AUTO: 4.01 10*6/MM3 (ref 3.77–5.28)
SODIUM SERPL-SCNC: 135 MMOL/L (ref 136–145)
WBC NRBC COR # BLD AUTO: 7.07 10*3/MM3 (ref 3.4–10.8)

## 2024-06-01 PROCEDURE — 93971 EXTREMITY STUDY: CPT

## 2024-06-01 PROCEDURE — 83605 ASSAY OF LACTIC ACID: CPT | Performed by: NURSE PRACTITIONER

## 2024-06-01 PROCEDURE — 36415 COLL VENOUS BLD VENIPUNCTURE: CPT

## 2024-06-01 PROCEDURE — 99284 EMERGENCY DEPT VISIT MOD MDM: CPT

## 2024-06-01 PROCEDURE — 93971 EXTREMITY STUDY: CPT | Performed by: SURGERY

## 2024-06-01 PROCEDURE — 87040 BLOOD CULTURE FOR BACTERIA: CPT | Performed by: NURSE PRACTITIONER

## 2024-06-01 PROCEDURE — 73660 X-RAY EXAM OF TOE(S): CPT

## 2024-06-01 PROCEDURE — 80053 COMPREHEN METABOLIC PANEL: CPT | Performed by: NURSE PRACTITIONER

## 2024-06-01 PROCEDURE — 25810000003 SODIUM CHLORIDE 0.9 % SOLUTION: Performed by: NURSE PRACTITIONER

## 2024-06-01 PROCEDURE — 85025 COMPLETE CBC W/AUTO DIFF WBC: CPT | Performed by: NURSE PRACTITIONER

## 2024-06-01 RX ORDER — CLINDAMYCIN HYDROCHLORIDE 300 MG/1
300 CAPSULE ORAL 3 TIMES DAILY
Qty: 30 CAPSULE | Refills: 0 | Status: SHIPPED | OUTPATIENT
Start: 2024-06-01 | End: 2024-06-11

## 2024-06-01 RX ORDER — SODIUM CHLORIDE 0.9 % (FLUSH) 0.9 %
10 SYRINGE (ML) INJECTION AS NEEDED
Status: DISCONTINUED | OUTPATIENT
Start: 2024-06-01 | End: 2024-06-01 | Stop reason: HOSPADM

## 2024-06-01 RX ADMIN — SODIUM CHLORIDE 1000 ML: 9 INJECTION, SOLUTION INTRAVENOUS at 14:10

## 2024-06-01 NOTE — DISCHARGE INSTRUCTIONS
Rest and take insulin as directed.  Increase water in your diet to help with constipation but also decreased your sugar.  Adhere to a low concentrated sweet diet.  Fill and start taking your antibiotic as directed, today.  Call your podiatrist on Monday to set up an appointment for further evaluation care.  Return to ER if develop worsening symptoms like fever nausea malaise or worsening wound.

## 2024-06-01 NOTE — ED PROVIDER NOTES
Subjective   History of Present Illness  53-year-old morbidly obese  female presents to the emergency room with complaint of left second toe wound.  Patient states that she is diabetic and she has a history of losing her toes due to osteomyelitis.  Patient denies fever, nausea or vomiting or malaise.  Patient denies flulike symptoms.  Patient also reports some increased redness and swelling of the right lower leg.  She states that it is on top of the shin area and denies pain or tenderness in the back of right lower leg/calf area      Review of Systems   Constitutional:  Negative for activity change, appetite change, chills, fatigue and fever.   Skin:  Positive for color change and wound.   Neurological:  Negative for weakness.       Past Medical History:   Diagnosis Date    Anxiety     Asthma     Bruise     On stomach for 3 months    Depression     Diabetes mellitus     Peripheral neuropathy     Sleep apnea     Thyroid disease     Type 2 diabetes mellitus        Allergies   Allergen Reactions    Sulfa Antibiotics Anaphylaxis    Dust Mite Extract Unknown - Low Severity    Molds & Smuts Unknown - Low Severity       Past Surgical History:   Procedure Laterality Date    AMPUTATION      CHOLECYSTECTOMY      GASTRIC BYPASS      LEG SURGERY      THYROIDECTOMY         Family History   Problem Relation Age of Onset    Cancer Mother     Cancer Father     Diabetes Father     Heart disease Paternal Grandfather        Social History     Socioeconomic History    Marital status:    Tobacco Use    Smoking status: Never    Smokeless tobacco: Never   Vaping Use    Vaping status: Never Used   Substance and Sexual Activity    Alcohol use: No    Drug use: No    Sexual activity: Defer           Objective   Physical Exam  Constitutional:       General: She is not in acute distress.     Appearance: Normal appearance. She is obese. She is not ill-appearing, toxic-appearing or diaphoretic.   HENT:      Head: Normocephalic  and atraumatic.      Mouth/Throat:      Mouth: Mucous membranes are moist.      Pharynx: Oropharynx is clear.   Eyes:      Pupils: Pupils are equal, round, and reactive to light.   Skin:     General: Skin is warm.      Capillary Refill: Capillary refill takes 2 to 3 seconds.      Findings: Wound present.             Comments: + eschar area   Neurological:      Mental Status: She is alert.         Procedures           ED Course      XR Toe 2+ View Left    Result Date: 6/1/2024  1.No evidence for displaced fracture or dislocation. 2.No evidence for osteomyelitis. 3.Soft tissue swelling is noted throughout the second digit. No evidence for gas production in the soft tissues. Electronically Signed: Patrick Jimenez MD  6/1/2024 11:31 AM EDT  Workstation ID: [id number]                             Medications   sodium chloride 0.9 % flush 10 mL (has no administration in time range)   sodium chloride 0.9 % bolus 1,000 mL (has no administration in time range)              Labs Reviewed   COMPREHENSIVE METABOLIC PANEL - Abnormal; Notable for the following components:       Result Value    Glucose 348 (*)     Sodium 135 (*)     Alkaline Phosphatase 118 (*)     All other components within normal limits    Narrative:     GFR Normal >60  Chronic Kidney Disease <60  Kidney Failure <15     CBC WITH AUTO DIFFERENTIAL - Abnormal; Notable for the following components:    Hemoglobin 11.8 (*)     Immature Grans % 0.7 (*)     All other components within normal limits   POC LACTATE - Abnormal; Notable for the following components:    Lactate 2.1 (*)     All other components within normal limits   BLOOD CULTURE   BLOOD CULTURE   LACTIC ACID, REFLEX   CBC AND DIFFERENTIAL    Narrative:     The following orders were created for panel order CBC & Differential.  Procedure                               Abnormality         Status                     ---------                               -----------         ------                     CBC Auto  Differential[[id number]]        Abnormal            Final result                 Please view results for these tests on the individual orders.      Medical Decision Making  53-year-old morbidly obese  female presents to the emergency room with complaint of left toe wound and right shin redness.  Patient denies fever or malaise.    Problems Addressed:  Cellulitis of second toe of left foot: self-limited or minor problem     Details: CBC is unremarkable but lactate is 2.1; most likely due to elevated glucose and/or dehydration.    Amount and/or Complexity of Data Reviewed  Labs: ordered.     Details: CBC is unremarkable.  No leukocytosis appreciated.  Lactate is 2.1 but may be reactionary possibly to uncontrolled diabetes.  1 L normal saline fluid bolus ordered and administered prior to discharge.  Radiology: ordered.     Details: Venous Doppler study of the right lower extremity was ordered and obtained and was found to be negative for DVT.  Left toe x-ray was ordered and obtained and was negative for osteo or subcutaneous gas.    Risk  Prescription drug management.  Risk Details: Discharge home with prescription for antibiotic regimen sent to pharmacy of choice.  Highly encourage patient to follow-up with her primary care and/or podiatrist on Monday or Tuesday for further evaluation and care of her left toe wound.        Final diagnoses:   Cellulitis of second toe of left foot       ED Disposition  ED Disposition       ED Disposition   Discharge    Condition   Stable    Comment   --               Kishore Penn MD  [address]  [place] [address] [phone]    Schedule an appointment as soon as possible for a visit in 3 days  As needed, If symptoms worsen    Kan Gray DPM  [address]  [facility] [address]  [place] [address] [phone]    Schedule an appointment as soon as possible for a visit in 2 days  for further eval and care of your toe cellulitis.          Medication List        New Prescriptions      clindamycin 300 MG capsule  Commonly known as: CLEOCIN  Take 1 capsule by mouth 3 (Three) Times a Day for 10 days.               Where to Get Your Medications        These medications were sent to [facility] - [place], [place] - [address] - [phone] [facility] [phone]   [address]  [place] [address]      Phone: [phone]   clindamycin 300 MG capsule            Bijal Mata, APRN  06/01/24 1500

## 2024-06-06 LAB
BACTERIA SPEC AEROBE CULT: NORMAL
BACTERIA SPEC AEROBE CULT: NORMAL

## 2024-06-17 ENCOUNTER — TRANSCRIBE ORDERS (OUTPATIENT)
Dept: ADMINISTRATIVE | Facility: HOSPITAL | Age: 54
End: 2024-06-17
Payer: COMMERCIAL

## 2024-06-17 DIAGNOSIS — N85.01 SIMPLE ENDOMETRIAL HYPERPLASIA WITHOUT ATYPIA: Primary | ICD-10-CM

## 2024-06-21 ENCOUNTER — HOSPITAL ENCOUNTER (OUTPATIENT)
Dept: MRI IMAGING | Facility: HOSPITAL | Age: 54
Discharge: HOME | End: 2024-06-21
Admitting: OBSTETRICS & GYNECOLOGY
Payer: COMMERCIAL

## 2024-06-21 DIAGNOSIS — N85.01 SIMPLE ENDOMETRIAL HYPERPLASIA WITHOUT ATYPIA: ICD-10-CM

## 2024-06-21 PROCEDURE — 25010000002 GADOTERIDOL PER 1 ML: Performed by: OBSTETRICS & GYNECOLOGY

## 2024-06-21 PROCEDURE — A9579 GAD-BASE MR CONTRAST NOS,1ML: HCPCS | Performed by: OBSTETRICS & GYNECOLOGY

## 2024-06-21 PROCEDURE — 72197 MRI PELVIS W/O & W/DYE: CPT

## 2024-06-21 RX ADMIN — GADOTERIDOL 20 ML: 279.3 INJECTION, SOLUTION INTRAVENOUS at 07:53

## 2024-09-03 ENCOUNTER — OFFICE VISIT (OUTPATIENT)
Dept: WOUND CARE | Facility: HOSPITAL | Age: 54
End: 2024-09-03
Payer: OTHER GOVERNMENT

## 2024-09-03 ENCOUNTER — LAB REQUISITION (OUTPATIENT)
Dept: LAB | Facility: HOSPITAL | Age: 54
End: 2024-09-03
Payer: OTHER GOVERNMENT

## 2024-09-03 DIAGNOSIS — M79.662 PAIN IN LEFT LOWER LEG: ICD-10-CM

## 2024-09-03 DIAGNOSIS — E11.621 TYPE 2 DIABETES MELLITUS WITH FOOT ULCER (CODE): ICD-10-CM

## 2024-09-03 PROCEDURE — 87205 SMEAR GRAM STAIN: CPT | Performed by: NURSE PRACTITIONER

## 2024-09-03 PROCEDURE — 87070 CULTURE OTHR SPECIMN AEROBIC: CPT | Performed by: NURSE PRACTITIONER

## 2024-09-06 ENCOUNTER — TRANSCRIBE ORDERS (OUTPATIENT)
Dept: ADMINISTRATIVE | Facility: HOSPITAL | Age: 54
End: 2024-09-06
Payer: OTHER GOVERNMENT

## 2024-09-06 DIAGNOSIS — M79.662 PAIN IN LEFT LOWER LEG: Primary | ICD-10-CM

## 2024-09-06 LAB
BACTERIA SPEC AEROBE CULT: NORMAL
GRAM STN SPEC: NORMAL

## 2024-09-17 ENCOUNTER — OFFICE VISIT (OUTPATIENT)
Dept: WOUND CARE | Facility: HOSPITAL | Age: 54
End: 2024-09-17
Payer: OTHER GOVERNMENT

## 2024-09-17 ENCOUNTER — HOSPITAL ENCOUNTER (OUTPATIENT)
Dept: CARDIOLOGY | Facility: HOSPITAL | Age: 54
Discharge: HOME | End: 2024-09-17
Payer: OTHER GOVERNMENT

## 2024-09-17 DIAGNOSIS — M79.662 PAIN IN LEFT LOWER LEG: ICD-10-CM

## 2024-09-17 LAB
BH CV LOWER ARTERIAL LEFT ABI RATIO: NORMAL
BH CV LOWER ARTERIAL LEFT DORSALIS PEDIS SYS MAX: 176
BH CV LOWER ARTERIAL LEFT GREAT TOE SYS MAX: 126
BH CV LOWER ARTERIAL LEFT POST TIBIAL SYS MAX: NORMAL
BH CV LOWER ARTERIAL LEFT TBI RATIO: 0.89
BH CV LOWER ARTERIAL RIGHT ABI RATIO: 1.14
BH CV LOWER ARTERIAL RIGHT DORSALIS PEDIS SYS MAX: 155
BH CV LOWER ARTERIAL RIGHT GREAT TOE SYS MAX: 150
BH CV LOWER ARTERIAL RIGHT POST TIBIAL SYS MAX: 161
BH CV LOWER ARTERIAL RIGHT TBI RATIO: 1.06
UPPER ARTERIAL LEFT ARM BRACHIAL SYS MAX: 136
UPPER ARTERIAL RIGHT ARM BRACHIAL SYS MAX: 141

## 2024-09-17 PROCEDURE — 93922 UPR/L XTREMITY ART 2 LEVELS: CPT | Performed by: SURGERY

## 2024-09-17 PROCEDURE — 93922 UPR/L XTREMITY ART 2 LEVELS: CPT

## 2024-10-01 ENCOUNTER — HOSPITAL ENCOUNTER (OUTPATIENT)
Dept: GENERAL RADIOLOGY | Facility: HOSPITAL | Age: 54
Discharge: HOME | End: 2024-10-01
Payer: OTHER GOVERNMENT

## 2024-10-01 ENCOUNTER — TRANSCRIBE ORDERS (OUTPATIENT)
Dept: ADMINISTRATIVE | Facility: HOSPITAL | Age: 54
End: 2024-10-01
Payer: OTHER GOVERNMENT

## 2024-10-01 ENCOUNTER — OFFICE VISIT (OUTPATIENT)
Dept: WOUND CARE | Facility: HOSPITAL | Age: 54
End: 2024-10-01
Payer: OTHER GOVERNMENT

## 2024-10-01 ENCOUNTER — LAB REQUISITION (OUTPATIENT)
Dept: LAB | Facility: HOSPITAL | Age: 54
End: 2024-10-01
Payer: OTHER GOVERNMENT

## 2024-10-01 DIAGNOSIS — L97.509 DIABETIC FOOT ULCER WITH OSTEOMYELITIS: ICD-10-CM

## 2024-10-01 DIAGNOSIS — E11.621 TYPE 2 DIABETES MELLITUS WITH DIABETIC TOE ULCER: ICD-10-CM

## 2024-10-01 DIAGNOSIS — M86.9 DIABETIC FOOT ULCER WITH OSTEOMYELITIS: ICD-10-CM

## 2024-10-01 DIAGNOSIS — E11.621 DIABETIC FOOT ULCER WITH OSTEOMYELITIS: ICD-10-CM

## 2024-10-01 DIAGNOSIS — E11.621 DIABETIC FOOT ULCER WITH OSTEOMYELITIS: Primary | ICD-10-CM

## 2024-10-01 DIAGNOSIS — M86.9 DIABETIC FOOT ULCER WITH OSTEOMYELITIS: Primary | ICD-10-CM

## 2024-10-01 DIAGNOSIS — L97.509 TYPE 2 DIABETES MELLITUS WITH DIABETIC TOE ULCER: ICD-10-CM

## 2024-10-01 DIAGNOSIS — E11.621 TYPE 2 DIABETES MELLITUS WITH FOOT ULCER (CODE): ICD-10-CM

## 2024-10-01 DIAGNOSIS — L97.509 DIABETIC FOOT ULCER WITH OSTEOMYELITIS: Primary | ICD-10-CM

## 2024-10-01 DIAGNOSIS — E11.69 DIABETIC FOOT ULCER WITH OSTEOMYELITIS: Primary | ICD-10-CM

## 2024-10-01 DIAGNOSIS — E11.69 DIABETIC FOOT ULCER WITH OSTEOMYELITIS: ICD-10-CM

## 2024-10-01 PROCEDURE — 73630 X-RAY EXAM OF FOOT: CPT

## 2024-10-01 PROCEDURE — 87205 SMEAR GRAM STAIN: CPT | Performed by: NURSE PRACTITIONER

## 2024-10-01 PROCEDURE — 87070 CULTURE OTHR SPECIMN AEROBIC: CPT | Performed by: NURSE PRACTITIONER

## 2024-10-04 LAB
BACTERIA SPEC AEROBE CULT: NORMAL
GRAM STN SPEC: NORMAL

## 2024-10-15 ENCOUNTER — OFFICE VISIT (OUTPATIENT)
Dept: WOUND CARE | Facility: HOSPITAL | Age: 54
End: 2024-10-15
Payer: OTHER GOVERNMENT

## 2024-10-16 ENCOUNTER — TRANSCRIBE ORDERS (OUTPATIENT)
Dept: ADMINISTRATIVE | Facility: HOSPITAL | Age: 54
End: 2024-10-16
Payer: OTHER GOVERNMENT

## 2024-10-16 ENCOUNTER — LAB (OUTPATIENT)
Dept: LAB | Facility: HOSPITAL | Age: 54
End: 2024-10-16
Payer: OTHER GOVERNMENT

## 2024-10-16 DIAGNOSIS — L97.509 TYPE 2 DIABETES WITH SKIN ULCER OF FOOT: Primary | ICD-10-CM

## 2024-10-16 DIAGNOSIS — E11.621 TYPE 2 DIABETES WITH SKIN ULCER OF FOOT: Primary | ICD-10-CM

## 2024-10-16 DIAGNOSIS — E11.621 TYPE 2 DIABETES WITH SKIN ULCER OF FOOT: ICD-10-CM

## 2024-10-16 DIAGNOSIS — L97.509 TYPE 2 DIABETES WITH SKIN ULCER OF FOOT: ICD-10-CM

## 2024-10-16 LAB
ALBUMIN SERPL-MCNC: 4.1 G/DL (ref 3.5–5.2)
ALBUMIN/GLOB SERPL: 1.5 G/DL
ALP SERPL-CCNC: 89 U/L (ref 39–117)
ALT SERPL W P-5'-P-CCNC: 13 U/L (ref 1–33)
ANION GAP SERPL CALCULATED.3IONS-SCNC: 13.7 MMOL/L (ref 5–15)
AST SERPL-CCNC: 24 U/L (ref 1–32)
BASOPHILS # BLD AUTO: 0.04 10*3/MM3 (ref 0–0.2)
BASOPHILS NFR BLD AUTO: 0.9 % (ref 0–1.5)
BILIRUB SERPL-MCNC: 0.6 MG/DL (ref 0–1.2)
BUN SERPL-MCNC: 12 MG/DL (ref 6–20)
BUN/CREAT SERPL: 12.2 (ref 7–25)
CALCIUM SPEC-SCNC: 9.2 MG/DL (ref 8.6–10.5)
CHLORIDE SERPL-SCNC: 99 MMOL/L (ref 98–107)
CO2 SERPL-SCNC: 23.3 MMOL/L (ref 22–29)
CREAT SERPL-MCNC: 0.98 MG/DL (ref 0.57–1)
CRP SERPL-MCNC: 1.63 MG/DL (ref 0–0.5)
DEPRECATED RDW RBC AUTO: 47.9 FL (ref 37–54)
EGFRCR SERPLBLD CKD-EPI 2021: 68.7 ML/MIN/1.73
EOSINOPHIL # BLD AUTO: 0.06 10*3/MM3 (ref 0–0.4)
EOSINOPHIL NFR BLD AUTO: 1.3 % (ref 0.3–6.2)
ERYTHROCYTE [DISTWIDTH] IN BLOOD BY AUTOMATED COUNT: 14 % (ref 12.3–15.4)
ERYTHROCYTE [SEDIMENTATION RATE] IN BLOOD: 16 MM/HR (ref 0–30)
GLOBULIN UR ELPH-MCNC: 2.8 GM/DL
GLUCOSE SERPL-MCNC: 261 MG/DL (ref 65–99)
HCT VFR BLD AUTO: 38.7 % (ref 34–46.6)
HGB BLD-MCNC: 12.1 G/DL (ref 12–15.9)
IMM GRANULOCYTES # BLD AUTO: 0.02 10*3/MM3 (ref 0–0.05)
IMM GRANULOCYTES NFR BLD AUTO: 0.4 % (ref 0–0.5)
LYMPHOCYTES # BLD AUTO: 1.13 10*3/MM3 (ref 0.7–3.1)
LYMPHOCYTES NFR BLD AUTO: 24.6 % (ref 19.6–45.3)
MCH RBC QN AUTO: 29.1 PG (ref 26.6–33)
MCHC RBC AUTO-ENTMCNC: 31.3 G/DL (ref 31.5–35.7)
MCV RBC AUTO: 93 FL (ref 79–97)
MONOCYTES # BLD AUTO: 0.42 10*3/MM3 (ref 0.1–0.9)
MONOCYTES NFR BLD AUTO: 9.2 % (ref 5–12)
NEUTROPHILS NFR BLD AUTO: 2.92 10*3/MM3 (ref 1.7–7)
NEUTROPHILS NFR BLD AUTO: 63.6 % (ref 42.7–76)
NRBC BLD AUTO-RTO: 0 /100 WBC (ref 0–0.2)
PLATELET # BLD AUTO: 272 10*3/MM3 (ref 140–450)
PMV BLD AUTO: 11.3 FL (ref 6–12)
POTASSIUM SERPL-SCNC: 4.3 MMOL/L (ref 3.5–5.2)
PREALB SERPL-MCNC: 18.4 MG/DL (ref 20–40)
PROT SERPL-MCNC: 6.9 G/DL (ref 6–8.5)
RBC # BLD AUTO: 4.16 10*6/MM3 (ref 3.77–5.28)
SODIUM SERPL-SCNC: 136 MMOL/L (ref 136–145)
WBC NRBC COR # BLD AUTO: 4.59 10*3/MM3 (ref 3.4–10.8)

## 2024-10-16 PROCEDURE — 36415 COLL VENOUS BLD VENIPUNCTURE: CPT

## 2024-10-16 PROCEDURE — 84134 ASSAY OF PREALBUMIN: CPT

## 2024-10-16 PROCEDURE — 80053 COMPREHEN METABOLIC PANEL: CPT

## 2024-10-16 PROCEDURE — 85025 COMPLETE CBC W/AUTO DIFF WBC: CPT

## 2024-10-16 PROCEDURE — 86140 C-REACTIVE PROTEIN: CPT

## 2024-10-16 PROCEDURE — 85652 RBC SED RATE AUTOMATED: CPT

## 2024-10-18 ENCOUNTER — TRANSCRIBE ORDERS (OUTPATIENT)
Dept: ADMINISTRATIVE | Facility: HOSPITAL | Age: 54
End: 2024-10-18
Payer: OTHER GOVERNMENT

## 2024-10-18 DIAGNOSIS — E11.621 TYPE 2 DIABETES MELLITUS WITH FOOT ULCER (CODE): Primary | ICD-10-CM

## 2024-10-26 ENCOUNTER — APPOINTMENT (OUTPATIENT)
Dept: GENERAL RADIOLOGY | Facility: HOSPITAL | Age: 54
End: 2024-10-26
Payer: OTHER GOVERNMENT

## 2024-10-26 ENCOUNTER — HOSPITAL ENCOUNTER (INPATIENT)
Facility: HOSPITAL | Age: 54
LOS: 6 days | Discharge: HOME | End: 2024-11-01
Payer: OTHER GOVERNMENT

## 2024-10-26 DIAGNOSIS — J22 LOWER RESPIRATORY INFECTION: ICD-10-CM

## 2024-10-26 DIAGNOSIS — L08.9 INFECTED WOUND: ICD-10-CM

## 2024-10-26 DIAGNOSIS — J45.901 EXACERBATION OF ASTHMA, UNSPECIFIED ASTHMA SEVERITY, UNSPECIFIED WHETHER PERSISTENT: ICD-10-CM

## 2024-10-26 DIAGNOSIS — L03.032 CELLULITIS OF TOE OF LEFT FOOT: ICD-10-CM

## 2024-10-26 DIAGNOSIS — Z86.39 HISTORY OF DIABETES MELLITUS: ICD-10-CM

## 2024-10-26 DIAGNOSIS — L03.116 CELLULITIS OF LEFT FOOT: Primary | ICD-10-CM

## 2024-10-26 DIAGNOSIS — L97.529 DIABETIC ULCER OF TOE OF LEFT FOOT ASSOCIATED WITH DIABETES MELLITUS OF OTHER TYPE, UNSPECIFIED ULCER STAGE: ICD-10-CM

## 2024-10-26 DIAGNOSIS — T14.8XXA INFECTED WOUND: ICD-10-CM

## 2024-10-26 DIAGNOSIS — E13.621 DIABETIC ULCER OF TOE OF LEFT FOOT ASSOCIATED WITH DIABETES MELLITUS OF OTHER TYPE, UNSPECIFIED ULCER STAGE: ICD-10-CM

## 2024-10-26 LAB
ALBUMIN SERPL-MCNC: 3.6 G/DL (ref 3.5–5.2)
ALBUMIN/GLOB SERPL: 1 G/DL
ALP SERPL-CCNC: 179 U/L (ref 39–117)
ALT SERPL W P-5'-P-CCNC: 49 U/L (ref 1–33)
ANION GAP SERPL CALCULATED.3IONS-SCNC: 12 MMOL/L (ref 5–15)
APTT PPP: 27.6 SECONDS (ref 24–31)
AST SERPL-CCNC: 87 U/L (ref 1–32)
B PARAPERT DNA SPEC QL NAA+PROBE: NOT DETECTED
B PERT DNA SPEC QL NAA+PROBE: NOT DETECTED
BASOPHILS # BLD AUTO: 0.06 10*3/MM3 (ref 0–0.2)
BASOPHILS NFR BLD AUTO: 1.1 % (ref 0–1.5)
BILIRUB SERPL-MCNC: 0.6 MG/DL (ref 0–1.2)
BUN SERPL-MCNC: 20 MG/DL (ref 6–20)
BUN/CREAT SERPL: 17.5 (ref 7–25)
C PNEUM DNA NPH QL NAA+NON-PROBE: NOT DETECTED
CALCIUM SPEC-SCNC: 8.6 MG/DL (ref 8.6–10.5)
CHLORIDE SERPL-SCNC: 98 MMOL/L (ref 98–107)
CK SERPL-CCNC: 52 U/L (ref 20–180)
CO2 SERPL-SCNC: 25 MMOL/L (ref 22–29)
CREAT SERPL-MCNC: 1.14 MG/DL (ref 0.57–1)
CRP SERPL-MCNC: 6.33 MG/DL (ref 0–0.5)
D-LACTATE SERPL-SCNC: 1.6 MMOL/L (ref 0.3–2)
DEPRECATED RDW RBC AUTO: 49.1 FL (ref 37–54)
EGFRCR SERPLBLD CKD-EPI 2021: 57.3 ML/MIN/1.73
EOSINOPHIL # BLD AUTO: 0.07 10*3/MM3 (ref 0–0.4)
EOSINOPHIL NFR BLD AUTO: 1.3 % (ref 0.3–6.2)
ERYTHROCYTE [DISTWIDTH] IN BLOOD BY AUTOMATED COUNT: 14.9 % (ref 12.3–15.4)
ERYTHROCYTE [SEDIMENTATION RATE] IN BLOOD: 24 MM/HR (ref 0–30)
FLUAV SUBTYP SPEC NAA+PROBE: NOT DETECTED
FLUBV RNA ISLT QL NAA+PROBE: NOT DETECTED
GLOBULIN UR ELPH-MCNC: 3.6 GM/DL
GLUCOSE BLDC GLUCOMTR-MCNC: 135 MG/DL (ref 70–105)
GLUCOSE BLDC GLUCOMTR-MCNC: 211 MG/DL (ref 70–105)
GLUCOSE SERPL-MCNC: 66 MG/DL (ref 65–99)
HADV DNA SPEC NAA+PROBE: NOT DETECTED
HCOV 229E RNA SPEC QL NAA+PROBE: NOT DETECTED
HCOV HKU1 RNA SPEC QL NAA+PROBE: NOT DETECTED
HCOV NL63 RNA SPEC QL NAA+PROBE: NOT DETECTED
HCOV OC43 RNA SPEC QL NAA+PROBE: NOT DETECTED
HCT VFR BLD AUTO: 34.6 % (ref 34–46.6)
HGB BLD-MCNC: 11 G/DL (ref 12–15.9)
HMPV RNA NPH QL NAA+NON-PROBE: NOT DETECTED
HPIV1 RNA ISLT QL NAA+PROBE: NOT DETECTED
HPIV2 RNA SPEC QL NAA+PROBE: NOT DETECTED
HPIV3 RNA NPH QL NAA+PROBE: NOT DETECTED
HPIV4 P GENE NPH QL NAA+PROBE: NOT DETECTED
IMM GRANULOCYTES # BLD AUTO: 0.15 10*3/MM3 (ref 0–0.05)
IMM GRANULOCYTES NFR BLD AUTO: 2.8 % (ref 0–0.5)
INR PPP: 0.94 (ref 0.93–1.1)
LYMPHOCYTES # BLD AUTO: 1.31 10*3/MM3 (ref 0.7–3.1)
LYMPHOCYTES NFR BLD AUTO: 24.4 % (ref 19.6–45.3)
M PNEUMO IGG SER IA-ACNC: NOT DETECTED
MCH RBC QN AUTO: 28.5 PG (ref 26.6–33)
MCHC RBC AUTO-ENTMCNC: 31.8 G/DL (ref 31.5–35.7)
MCV RBC AUTO: 89.6 FL (ref 79–97)
MONOCYTES # BLD AUTO: 0.58 10*3/MM3 (ref 0.1–0.9)
MONOCYTES NFR BLD AUTO: 10.8 % (ref 5–12)
NEUTROPHILS NFR BLD AUTO: 3.19 10*3/MM3 (ref 1.7–7)
NEUTROPHILS NFR BLD AUTO: 59.6 % (ref 42.7–76)
NRBC BLD AUTO-RTO: 0 /100 WBC (ref 0–0.2)
PLATELET # BLD AUTO: 346 10*3/MM3 (ref 140–450)
PMV BLD AUTO: 10.6 FL (ref 6–12)
POTASSIUM SERPL-SCNC: 4.5 MMOL/L (ref 3.5–5.2)
PROT SERPL-MCNC: 7.2 G/DL (ref 6–8.5)
PROTHROMBIN TIME: 10.3 SECONDS (ref 9.6–11.7)
RBC # BLD AUTO: 3.86 10*6/MM3 (ref 3.77–5.28)
RHINOVIRUS RNA SPEC NAA+PROBE: NOT DETECTED
RSV RNA NPH QL NAA+NON-PROBE: NOT DETECTED
SARS-COV-2 RNA NPH QL NAA+NON-PROBE: NOT DETECTED
SODIUM SERPL-SCNC: 135 MMOL/L (ref 136–145)
TSH SERPL DL<=0.05 MIU/L-ACNC: 1.61 UIU/ML (ref 0.27–4.2)
WBC NRBC COR # BLD AUTO: 5.36 10*3/MM3 (ref 3.4–10.8)

## 2024-10-26 PROCEDURE — 82550 ASSAY OF CK (CPK): CPT

## 2024-10-26 PROCEDURE — 73630 X-RAY EXAM OF FOOT: CPT

## 2024-10-26 PROCEDURE — 25810000003 SODIUM CHLORIDE 0.9 % SOLUTION

## 2024-10-26 PROCEDURE — 83605 ASSAY OF LACTIC ACID: CPT | Performed by: NURSE PRACTITIONER

## 2024-10-26 PROCEDURE — 25010000002 VANCOMYCIN 2-0.9 GM/500ML-% SOLUTION: Performed by: NURSE PRACTITIONER

## 2024-10-26 PROCEDURE — 94640 AIRWAY INHALATION TREATMENT: CPT

## 2024-10-26 PROCEDURE — 63710000001 INSULIN LISPRO (HUMAN) PER 5 UNITS

## 2024-10-26 PROCEDURE — 82948 REAGENT STRIP/BLOOD GLUCOSE: CPT

## 2024-10-26 PROCEDURE — 25010000002 HEPARIN (PORCINE) PER 1000 UNITS

## 2024-10-26 PROCEDURE — 99285 EMERGENCY DEPT VISIT HI MDM: CPT

## 2024-10-26 PROCEDURE — 0202U NFCT DS 22 TRGT SARS-COV-2: CPT | Performed by: NURSE PRACTITIONER

## 2024-10-26 PROCEDURE — 85025 COMPLETE CBC W/AUTO DIFF WBC: CPT | Performed by: NURSE PRACTITIONER

## 2024-10-26 PROCEDURE — 94799 UNLISTED PULMONARY SVC/PX: CPT

## 2024-10-26 PROCEDURE — 94761 N-INVAS EAR/PLS OXIMETRY MLT: CPT

## 2024-10-26 PROCEDURE — 84443 ASSAY THYROID STIM HORMONE: CPT

## 2024-10-26 PROCEDURE — 86140 C-REACTIVE PROTEIN: CPT | Performed by: NURSE PRACTITIONER

## 2024-10-26 PROCEDURE — 25010000002 PIPERACILLIN SOD-TAZOBACTAM PER 1 G

## 2024-10-26 PROCEDURE — 85652 RBC SED RATE AUTOMATED: CPT | Performed by: NURSE PRACTITIONER

## 2024-10-26 PROCEDURE — 36415 COLL VENOUS BLD VENIPUNCTURE: CPT

## 2024-10-26 PROCEDURE — 87040 BLOOD CULTURE FOR BACTERIA: CPT | Performed by: NURSE PRACTITIONER

## 2024-10-26 PROCEDURE — 25010000002 CEFTRIAXONE PER 250 MG: Performed by: NURSE PRACTITIONER

## 2024-10-26 PROCEDURE — 63710000001 INSULIN GLARGINE PER 5 UNITS

## 2024-10-26 PROCEDURE — 80053 COMPREHEN METABOLIC PANEL: CPT | Performed by: NURSE PRACTITIONER

## 2024-10-26 PROCEDURE — 94664 DEMO&/EVAL PT USE INHALER: CPT

## 2024-10-26 PROCEDURE — 85610 PROTHROMBIN TIME: CPT | Performed by: NURSE PRACTITIONER

## 2024-10-26 PROCEDURE — 85730 THROMBOPLASTIN TIME PARTIAL: CPT | Performed by: NURSE PRACTITIONER

## 2024-10-26 RX ORDER — HEPARIN SODIUM 5000 [USP'U]/ML
5000 INJECTION, SOLUTION INTRAVENOUS; SUBCUTANEOUS EVERY 8 HOURS SCHEDULED
Status: DISCONTINUED | OUTPATIENT
Start: 2024-10-26 | End: 2024-10-30

## 2024-10-26 RX ORDER — IPRATROPIUM BROMIDE AND ALBUTEROL SULFATE 2.5; .5 MG/3ML; MG/3ML
3 SOLUTION RESPIRATORY (INHALATION) ONCE
Status: COMPLETED | OUTPATIENT
Start: 2024-10-26 | End: 2024-10-26

## 2024-10-26 RX ORDER — LISINOPRIL 20 MG/1
20 TABLET ORAL
Status: DISCONTINUED | OUTPATIENT
Start: 2024-10-27 | End: 2024-11-01 | Stop reason: HOSPADM

## 2024-10-26 RX ORDER — BUPROPION HYDROCHLORIDE 150 MG/1
300 TABLET ORAL DAILY
Status: DISCONTINUED | OUTPATIENT
Start: 2024-10-27 | End: 2024-11-01 | Stop reason: HOSPADM

## 2024-10-26 RX ORDER — SODIUM CHLORIDE 9 MG/ML
75 INJECTION, SOLUTION INTRAVENOUS CONTINUOUS
Status: DISPENSED | OUTPATIENT
Start: 2024-10-26 | End: 2024-10-27

## 2024-10-26 RX ORDER — BUDESONIDE AND FORMOTEROL FUMARATE DIHYDRATE 160; 4.5 UG/1; UG/1
2 AEROSOL RESPIRATORY (INHALATION)
Status: DISCONTINUED | OUTPATIENT
Start: 2024-10-26 | End: 2024-11-01 | Stop reason: HOSPADM

## 2024-10-26 RX ORDER — INSULIN LISPRO 100 [IU]/ML
2-9 INJECTION, SOLUTION INTRAVENOUS; SUBCUTANEOUS
Status: DISCONTINUED | OUTPATIENT
Start: 2024-10-26 | End: 2024-10-27

## 2024-10-26 RX ORDER — GABAPENTIN 300 MG/1
300 CAPSULE ORAL 3 TIMES DAILY
Status: DISCONTINUED | OUTPATIENT
Start: 2024-10-26 | End: 2024-10-26 | Stop reason: SDUPTHER

## 2024-10-26 RX ORDER — VANCOMYCIN 2 GRAM/500 ML IN 0.9 % SODIUM CHLORIDE INTRAVENOUS
2000 ONCE
Status: COMPLETED | OUTPATIENT
Start: 2024-10-26 | End: 2024-10-26

## 2024-10-26 RX ORDER — SODIUM CHLORIDE 9 MG/ML
40 INJECTION, SOLUTION INTRAVENOUS AS NEEDED
Status: DISPENSED | OUTPATIENT
Start: 2024-10-26 | End: 2024-10-27

## 2024-10-26 RX ORDER — ACETAMINOPHEN 325 MG/1
650 TABLET ORAL EVERY 4 HOURS PRN
Status: DISCONTINUED | OUTPATIENT
Start: 2024-10-26 | End: 2024-10-26

## 2024-10-26 RX ORDER — BISACODYL 10 MG
10 SUPPOSITORY, RECTAL RECTAL DAILY PRN
Status: DISCONTINUED | OUTPATIENT
Start: 2024-10-26 | End: 2024-11-01 | Stop reason: HOSPADM

## 2024-10-26 RX ORDER — ALBUTEROL SULFATE 0.83 MG/ML
2.5 SOLUTION RESPIRATORY (INHALATION) EVERY 4 HOURS PRN
Status: DISCONTINUED | OUTPATIENT
Start: 2024-10-26 | End: 2024-11-01 | Stop reason: HOSPADM

## 2024-10-26 RX ORDER — DEXTROSE MONOHYDRATE 25 G/50ML
25 INJECTION, SOLUTION INTRAVENOUS
Status: DISCONTINUED | OUTPATIENT
Start: 2024-10-26 | End: 2024-10-27 | Stop reason: ALTCHOICE

## 2024-10-26 RX ORDER — GABAPENTIN 300 MG/1
300 CAPSULE ORAL EVERY 8 HOURS SCHEDULED
Status: DISCONTINUED | OUTPATIENT
Start: 2024-10-26 | End: 2024-11-01 | Stop reason: HOSPADM

## 2024-10-26 RX ORDER — SODIUM CHLORIDE 0.9 % (FLUSH) 0.9 %
10 SYRINGE (ML) INJECTION EVERY 12 HOURS SCHEDULED
Status: DISCONTINUED | OUTPATIENT
Start: 2024-10-26 | End: 2024-11-01 | Stop reason: HOSPADM

## 2024-10-26 RX ORDER — SODIUM CHLORIDE 0.9 % (FLUSH) 0.9 %
10 SYRINGE (ML) INJECTION AS NEEDED
Status: DISCONTINUED | OUTPATIENT
Start: 2024-10-26 | End: 2024-11-01 | Stop reason: HOSPADM

## 2024-10-26 RX ORDER — BISACODYL 5 MG/1
5 TABLET, DELAYED RELEASE ORAL DAILY PRN
Status: DISCONTINUED | OUTPATIENT
Start: 2024-10-26 | End: 2024-11-01 | Stop reason: HOSPADM

## 2024-10-26 RX ORDER — ACETAMINOPHEN 160 MG/5ML
650 SOLUTION ORAL EVERY 4 HOURS PRN
Status: DISCONTINUED | OUTPATIENT
Start: 2024-10-26 | End: 2024-10-26

## 2024-10-26 RX ORDER — ACETAMINOPHEN 650 MG/1
650 SUPPOSITORY RECTAL EVERY 4 HOURS PRN
Status: DISCONTINUED | OUTPATIENT
Start: 2024-10-26 | End: 2024-11-01 | Stop reason: HOSPADM

## 2024-10-26 RX ORDER — ACETAMINOPHEN 650 MG/1
650 SUPPOSITORY RECTAL EVERY 4 HOURS PRN
Status: DISCONTINUED | OUTPATIENT
Start: 2024-10-26 | End: 2024-10-26

## 2024-10-26 RX ORDER — POLYETHYLENE GLYCOL 3350 17 G/17G
17 POWDER, FOR SOLUTION ORAL DAILY PRN
Status: DISCONTINUED | OUTPATIENT
Start: 2024-10-26 | End: 2024-11-01 | Stop reason: HOSPADM

## 2024-10-26 RX ORDER — AMOXICILLIN 250 MG
2 CAPSULE ORAL 2 TIMES DAILY PRN
Status: DISCONTINUED | OUTPATIENT
Start: 2024-10-26 | End: 2024-11-01 | Stop reason: HOSPADM

## 2024-10-26 RX ORDER — DIVALPROEX SODIUM 500 MG/1
1500 TABLET, FILM COATED, EXTENDED RELEASE ORAL NIGHTLY
Status: DISCONTINUED | OUTPATIENT
Start: 2024-10-26 | End: 2024-11-01 | Stop reason: HOSPADM

## 2024-10-26 RX ORDER — PANTOPRAZOLE SODIUM 40 MG/1
40 TABLET, DELAYED RELEASE ORAL
Status: DISCONTINUED | OUTPATIENT
Start: 2024-10-27 | End: 2024-11-01 | Stop reason: HOSPADM

## 2024-10-26 RX ORDER — LEVOTHYROXINE SODIUM 100 UG/1
200 TABLET ORAL
Status: DISCONTINUED | OUTPATIENT
Start: 2024-10-27 | End: 2024-11-01 | Stop reason: HOSPADM

## 2024-10-26 RX ORDER — NICOTINE POLACRILEX 4 MG
15 LOZENGE BUCCAL
Status: DISCONTINUED | OUTPATIENT
Start: 2024-10-26 | End: 2024-10-27 | Stop reason: ALTCHOICE

## 2024-10-26 RX ORDER — ACETAMINOPHEN 160 MG/5ML
650 SOLUTION ORAL EVERY 4 HOURS PRN
Status: DISCONTINUED | OUTPATIENT
Start: 2024-10-26 | End: 2024-11-01 | Stop reason: HOSPADM

## 2024-10-26 RX ORDER — NITROGLYCERIN 0.4 MG/1
0.4 TABLET SUBLINGUAL
Status: DISCONTINUED | OUTPATIENT
Start: 2024-10-26 | End: 2024-11-01 | Stop reason: HOSPADM

## 2024-10-26 RX ORDER — IBUPROFEN 600 MG/1
1 TABLET ORAL
Status: DISCONTINUED | OUTPATIENT
Start: 2024-10-26 | End: 2024-10-27 | Stop reason: ALTCHOICE

## 2024-10-26 RX ORDER — ACETAMINOPHEN 500 MG
1000 TABLET ORAL EVERY 4 HOURS PRN
Status: DISCONTINUED | OUTPATIENT
Start: 2024-10-26 | End: 2024-11-01 | Stop reason: HOSPADM

## 2024-10-26 RX ADMIN — HEPARIN SODIUM 5000 UNITS: 5000 INJECTION INTRAVENOUS; SUBCUTANEOUS at 21:58

## 2024-10-26 RX ADMIN — DIVALPROEX SODIUM 1500 MG: 500 TABLET, EXTENDED RELEASE ORAL at 21:58

## 2024-10-26 RX ADMIN — BUDESONIDE AND FORMOTEROL FUMARATE DIHYDRATE 2 PUFF: 160; 4.5 AEROSOL RESPIRATORY (INHALATION) at 20:01

## 2024-10-26 RX ADMIN — CEFTRIAXONE 2000 MG: 2 INJECTION, POWDER, FOR SOLUTION INTRAMUSCULAR; INTRAVENOUS at 16:54

## 2024-10-26 RX ADMIN — Medication 10 ML: at 21:59

## 2024-10-26 RX ADMIN — Medication 2000 MG: at 18:05

## 2024-10-26 RX ADMIN — ALBUTEROL SULFATE 2.5 MG: 2.5 SOLUTION RESPIRATORY (INHALATION) at 23:55

## 2024-10-26 RX ADMIN — IPRATROPIUM BROMIDE AND ALBUTEROL SULFATE 3 ML: .5; 3 SOLUTION RESPIRATORY (INHALATION) at 17:08

## 2024-10-26 RX ADMIN — INSULIN GLARGINE 10 UNITS: 100 INJECTION, SOLUTION SUBCUTANEOUS at 21:58

## 2024-10-26 RX ADMIN — PIPERACILLIN AND TAZOBACTAM 3.38 G: 3; .375 INJECTION, POWDER, FOR SOLUTION INTRAVENOUS at 21:58

## 2024-10-26 RX ADMIN — GABAPENTIN 300 MG: 300 CAPSULE ORAL at 21:58

## 2024-10-26 RX ADMIN — SODIUM CHLORIDE 75 ML/HR: 9 INJECTION, SOLUTION INTRAVENOUS at 21:58

## 2024-10-26 RX ADMIN — INSULIN LISPRO 4 UNITS: 100 INJECTION, SOLUTION INTRAVENOUS; SUBCUTANEOUS at 21:58

## 2024-10-26 NOTE — ED PROVIDER NOTES
Subjective   History of Present Illness  Patient is a 54-year-old obese white female with a history of insulin-dependent diabetes who presents today for complaints of increasing redness and swelling to her left second toe.  She states she has been treated for a wound on her toe since June of this year.  She has been followed by wound care center most recently.  She states it appeared that it was healing well however she was ill earlier this week with fever and cough and she felt generally weak and she sustained a fall at home.  Denies any significant injury from her fall but states she did have to crawl across the carpet to get to a place to get herself up.  She states she think she may have injured the toe as she did so.  She states today she noticed some bloody drainage through her sock, she took off her sock she noticed the redness and the swelling to the toe.  She also notes some streaking up the foot.      Review of Systems   Constitutional:  Positive for fever.   HENT:  Positive for congestion.    Respiratory:  Positive for cough.    Musculoskeletal:         Redness and swelling left second toe       Past Medical History:   Diagnosis Date    Anxiety     Asthma     Bruise     On stomach for 3 months    Depression     Diabetes mellitus     Peripheral neuropathy     Sleep apnea     Thyroid disease     Type 2 diabetes mellitus        Allergies   Allergen Reactions    Dust Mite Extract Unknown - Low Severity    Molds & Smuts Unknown - Low Severity    Bactrim [Sulfamethoxazole-Trimethoprim] Rash     blisters    Trimethoprim Rash       Past Surgical History:   Procedure Laterality Date    AMPUTATION      CHOLECYSTECTOMY      GASTRIC BYPASS      LEG SURGERY      THYROIDECTOMY         Family History   Problem Relation Age of Onset    Cancer Mother     Cancer Father     Diabetes Father     Heart disease Paternal Grandfather        Social History     Socioeconomic History    Marital status:    Tobacco Use     Smoking status: Never    Smokeless tobacco: Never   Vaping Use    Vaping status: Never Used   Substance and Sexual Activity    Alcohol use: No    Drug use: No    Sexual activity: Defer           Objective   Physical Exam  Vital signs and triage nurse note reviewed.  Constitutional: Awake, alert; well-developed and well-nourished. No acute distress is noted.  HEENT: Normocephalic, atraumatic; pupils are PERRL with intact EOM; oropharynx is pink and moist without exudate or erythema.  No drooling or pooling of oral secretions.  Neck: Supple, full range of motion without pain; no cervical lymphadenopathy. Normal phonation.  Cardiovascular: Regular rate and rhythm, normal S1-S2.  No murmur noted.  Pulmonary: Respiratory effort regular nonlabored, breath sounds clear to auscultation all fields.  Abdomen: Soft, nontender, nondistended with normoactive bowel sounds; no rebound or guarding.  Musculoskeletal: Independent range of motion of all extremities with no palpable tenderness or edema.   Skin: Flesh tone, warm, dry.  Diffuse erythema and warmth noted to the entire left second toe with some streaking up through the foot.  There is an ulceration noted to the dorsal aspect of the second toe with no active bleeding or drainage.    Procedures           ED Course      Labs Reviewed   COMPREHENSIVE METABOLIC PANEL - Abnormal; Notable for the following components:       Result Value    Creatinine 1.14 (*)     Sodium 135 (*)     ALT (SGPT) 49 (*)     AST (SGOT) 87 (*)     Alkaline Phosphatase 179 (*)     eGFR 57.3 (*)     All other components within normal limits    Narrative:     GFR Normal >60  Chronic Kidney Disease <60  Kidney Failure <15     C-REACTIVE PROTEIN - Abnormal; Notable for the following components:    C-Reactive Protein 6.33 (*)     All other components within normal limits   CBC WITH AUTO DIFFERENTIAL - Abnormal; Notable for the following components:    Hemoglobin 11.0 (*)     Immature Grans % 2.8 (*)      Immature Grans, Absolute 0.15 (*)     All other components within normal limits   PROTIME-INR - Normal   APTT - Normal   SEDIMENTATION RATE - Normal   POC LACTATE - Normal   BLOOD CULTURE   BLOOD CULTURE   RESPIRATORY PANEL PCR W/ COVID-19 (SARS-COV-2), NP SWAB IN UTM/VTP, 2 HR TAT   CBC AND DIFFERENTIAL    Narrative:     The following orders were created for panel order CBC & Differential.  Procedure                               Abnormality         Status                     ---------                               -----------         ------                     CBC Auto Differential[[id number]]        Abnormal            Final result                 Please view results for these tests on the individual orders.     XR Foot 3+ View Left    Result Date: 10/26/2024  Impression: 1. Amputation of the first digit. 2. Soft tissue swelling of the foot and marked soft tissue swelling of the second and third digits. No radiopaque foreign body or gas within the soft tissues. 3. No conventional radiographic evidence of osteomyelitis. Electronically Signed: Fredy Stinson MD  10/26/2024 3:56 PM EDT  Workstation ID: [id number]   Medications   sodium chloride 0.9 % flush 10 mL (has no administration in time range)   cefTRIAXone (ROCEPHIN) 2,000 mg in sodium chloride 0.9 % 100 mL MBP (2,000 mg Intravenous New Bag 10/26/24 1654)   vancomycin IVPB 2000 mg in 0.9% Sodium Chloride 500 mL (has no administration in time range)   ipratropium-albuterol (DUO-NEB) nebulizer solution 3 mL (3 mL Nebulization Given 10/26/24 1708)                                              Medical Decision Making  Patient presents today with the above complaint.    She had the above exam and evaluation.  IV was established.  Labs and x-ray were obtained.  She was started on IV antibiotics.  Blood cultures and POC lactate were obtained.    Workup: CBC is unremarkable with a normal white blood cell count of 5.3, hemoglobin appears stable at 11.0.  Sed rate within  normal notes at 24.  POC lactate within normal limits at 1.6.  CRP 6.  X-ray of left foot was independently interpreted by Dr. Arce and reviewed by myself showed no definite osteomyelitis, radiologist reads amputation of the first digit.  Soft tissue swelling of the left foot and marked soft tissue swelling of the second and third digits.  No radiopaque foreign body or gas within the soft tissue's.    On reexamination patient resting quietly no distress.  She has no new complaints.  She is well-appearing and hemodynamically stable.  She is afebrile here.  Findings were discussed with her.  She will be admitted to the hospital for IV antibiotics and further management.  Case discussed with hospitalist.    Problems Addressed:  Cellulitis of left foot: complicated acute illness or injury  Diabetic ulcer of toe of left foot associated with diabetes mellitus of other type, unspecified ulcer stage: complicated acute illness or injury  History of diabetes mellitus: complicated acute illness or injury    Amount and/or Complexity of Data Reviewed  Labs: ordered.  Radiology: ordered.    Risk  Prescription drug management.  Decision regarding hospitalization.        Final diagnoses:   Cellulitis of left foot   Diabetic ulcer of toe of left foot associated with diabetes mellitus of other type, unspecified ulcer stage   History of diabetes mellitus       ED Disposition  ED Disposition       ED Disposition   Decision to Admit    Condition   --    Comment   Level of Care: Med/Surg [1]   Admitting Physician: CRISTHIAN RAMOS [[id number]]   Attending Physician: CRISTHIAN RAMOS [[id number]]                 No follow-up provider specified.       Medication List      No changes were made to your prescriptions during this visit.            Chitra Liao, MARTA  10/26/24 2203

## 2024-10-26 NOTE — H&P
[facility] Medicine Services  History & Physical    Patient Name: Marilou Abbott  : 1970  MRN: [id number]  Primary Care Physician:  Kishore Penn MD  Date of admission: 10/26/2024  Date and Time of Service: 10/26/2024 at 1900    Subjective      Chief Complaint: Left second toe cellulitis    History of Present Illness: Marilou Abbott is a 54 y.o. female with a CMH of asthma, type 2 diabetes, thyroid disease, hypertension, depression, anxiety, diabetic peripheral neuropathy complicated by amputation of left great toe who presented to [facility] on 10/26/2024 with left second toe cellulitis.  She has had recurrent nonhealing wound to left second toe since  that mildly improving get worse..  At the wound center and her podiatrist in the past week prior to presentation she URI symptoms with fever and that improved with amoxicillin from urgent care.  She reports that due to generalized fatigue she fell 3 times within the week which further open to wound, on day of presentation she noticed blood on stockings from the wound with worsening redness prompting her to report to the emergency department.  She denies loss of consciousness, head strike, nausea, vomiting, diarrhea sick contacts or recent travels      Pertinent ED findings: Afebrile, CRP 1.63, ESR 24, no leukocytosis, creatinine 1.14, AST 87, ALT 49 unremarkable CBC.  X-ray of the left foot shows soft tissue swelling of the second and third digits however no convincing radiographic evidence of osteomyelitis.  She received vancomycin and Rocephin in the ED      Review of Systems   Constitutional:  Positive for fatigue and fever. Negative for activity change, appetite change and chills.   HENT:  Negative for congestion, drooling, hearing loss, nosebleeds, sinus pressure, sinus pain, sneezing, sore throat, tinnitus and trouble swallowing.    Respiratory:  Negative for chest tightness, shortness of breath and wheezing.     Cardiovascular:  Negative for chest pain, palpitations and leg swelling.   Gastrointestinal:  Negative for abdominal distention, abdominal pain, constipation, diarrhea and nausea.   Genitourinary:  Negative for difficulty urinating, dysuria and flank pain.   Musculoskeletal:  Positive for joint swelling (Left second toe). Negative for arthralgias, back pain, gait problem, myalgias, neck pain and neck stiffness.   Skin:  Positive for color change (Redness in the left second toe) and wound (Left second toe). Negative for pallor and rash.   Neurological:  Positive for weakness. Negative for dizziness, seizures, syncope, light-headedness, numbness and headaches.   Psychiatric/Behavioral:  Negative for agitation and behavioral problems.        Personal History     Past Medical History:   Diagnosis Date    Anxiety     Asthma     Bruise     On stomach for 3 months    Depression     Diabetes mellitus     Peripheral neuropathy     Sleep apnea     Thyroid disease     Type 2 diabetes mellitus        Past Surgical History:   Procedure Laterality Date    AMPUTATION      CHOLECYSTECTOMY      GASTRIC BYPASS      LEG SURGERY      THYROIDECTOMY         Family History: family history includes Cancer in her father and mother; Diabetes in her father; Heart disease in her paternal grandfather. Otherwise pertinent FHx was reviewed and not pertinent to current issue.    Social History:  reports that she has never smoked. She has never used smokeless tobacco. She reports that she does not drink alcohol and does not use drugs.    Home Medications:  Prior to Admission Medications       Prescriptions Last Dose Informant Patient Reported? Taking?    albuterol sulfate  (90 Base) MCG/ACT inhaler   No No    Inhale 2 puffs Every 4 (Four) Hours As Needed for Wheezing or Shortness of Air.    amoxicillin (AMOXIL) 875 MG tablet   No No    Take 1 tablet by mouth 2 (Two) Times a Day for 10 days.    budesonide-formoterol (Symbicort) 80-4.5  MCG/ACT inhaler   No No    Inhale 2 puffs 2 (Two) Times a Day.    buPROPion XL (WELLBUTRIN XL) 300 MG 24 hr tablet   Yes No    dicyclomine (Bentyl) 10 MG capsule   Yes No    10 mg.    divalproex (DEPAKOTE ER) 500 MG 24 hr tablet   Yes No    1,500 mg.    gabapentin (NEURONTIN) 300 MG capsule   No No    Take 1 capsule by mouth 3 (Three) Times a Day.    gabapentin (NEURONTIN) 300 MG capsule   Yes No    300 mg.    glucagon (GLUCAGEN HYPOKIT) 1 MG injection   Yes No    GLUCAGEN HYPOKIT 1 MG SOLR    insulin aspart (NovoLOG) 100 UNIT/ML patient supplied pump   Yes No    Inject  under the skin into the appropriate area as directed Continuous. Use in insulin pump    insulin regular (NovoLIN R) 100 UNIT/ML injection   Yes No    See Instructions, PER SLIDING SCALE PROVIDED INJECT SUBCUTANEOUSLY THREE TIMES DAILY BEFORE MEALS. DO NOT EXCEED 150 UNITS PER 24 HOURS, # 120 mL, 5 Refill(s), DEVON, Pharmacy: [id number], Dx: e11.65, PER SLIDING SCALE PROVIDED INJECT SUBCUTANEOUSLY THREE TIMES DAILY BEFORE MEALS. DO NOT EXCEED 150 UNITS PER 24 HOURS, 177, cm, 05/17/24 8:34:00 EDT, Height, 139, kg, 05/17/24 11:40:00 EDT, Weight Dosing    Lantus 100 UNIT/ML injection   No No    INJECT 65 UNITS UNDER THE SKIN AS DIRECTED IN CASE OF PUMP FAILURE    levothyroxine (SYNTHROID, LEVOTHROID) 200 MCG tablet   Yes No    Take 200 mcg by mouth Daily.    lisinopril (PRINIVIL,ZESTRIL) 20 MG tablet   Yes No    20 mg.    omeprazole (priLOSEC) 40 MG capsule   Yes No    Take 1 capsule by mouth 2 (Two) Times a Day.    Ozempic, 0.25 or 0.5 MG/DOSE, 2 MG/3ML solution pen-injector   Yes No    Inject 0.25 mg under the skin into the appropriate area as directed.    ziprasidone (GEODON) 60 MG capsule   Yes No    1 capsule.              Allergies:  Allergies   Allergen Reactions    Dust Mite Extract Unknown - Low Severity    Molds & Smuts Unknown - Low Severity    Bactrim [Sulfamethoxazole-Trimethoprim] Rash     blisters    Trimethoprim Rash        Objective      Vitals:   Temp:  [98.1 °F (36.7 °C)] 98.1 °F (36.7 °C)  Heart Rate:  [86-94] 88  Resp:  [20] 20  BP: (120-136)/(59-80) 134/70  Body mass index is 39.53 kg/m².  Physical Exam  Constitutional:       Appearance: Normal appearance.   Cardiovascular:      Rate and Rhythm: Normal rate and regular rhythm.      Pulses: Normal pulses.      Heart sounds: Normal heart sounds. No murmur heard.  Pulmonary:      Effort: Pulmonary effort is normal. No respiratory distress.      Breath sounds: Normal breath sounds. No wheezing or rales.   Abdominal:      General: Bowel sounds are normal. There is no distension.      Tenderness: There is no abdominal tenderness.   Musculoskeletal:         General: No swelling, tenderness, deformity or signs of injury.      Cervical back: Normal range of motion.      Comments: Amputation of left great toe   Skin:     Findings: Erythema (Left second toe with wound in the dorsal region) present.   Neurological:      General: No focal deficit present.      Mental Status: She is alert and oriented to person, place, and time. Mental status is at baseline.      Cranial Nerves: No cranial nerve deficit.      Sensory: No sensory deficit.      Motor: No weakness.      Coordination: Coordination normal.         Diagnostic Data:  Lab Results (last 24 hours)       Procedure Component Value Units Date/Time    TSH Rfx On Abnormal To Free T4 [[id number]]  (Normal) Collected: 10/26/24 1609    Specimen: Blood from Arm, Left Updated: 10/26/24 1902     TSH 1.610 uIU/mL     Respiratory Panel PCR w/COVID-19(SARS-CoV-2) SHAYNE/JAMESON/ANDREW/PAD/COR/DARRYL In-House, NP Swab in [facility], 2 HR TAT - Swab, Nasopharynx [[id number]]  (Normal) Collected: 10/26/24 1807    Specimen: Swab from Nasopharynx Updated: 10/26/24 1859     ADENOVIRUS, PCR Not Detected     Coronavirus 229E Not Detected     Coronavirus HKU1 Not Detected     Coronavirus NL63 Not Detected     Coronavirus OC43 Not Detected     COVID19 Not Detected      Human Metapneumovirus Not Detected     Human Rhinovirus/Enterovirus Not Detected     Influenza A PCR Not Detected     Influenza B PCR Not Detected     Parainfluenza Virus 1 Not Detected     Parainfluenza Virus 2 Not Detected     Parainfluenza Virus 3 Not Detected     Parainfluenza Virus 4 Not Detected     RSV, PCR Not Detected     Bordetella pertussis pcr Not Detected     Bordetella parapertussis PCR Not Detected     Chlamydophila pneumoniae PCR Not Detected     Mycoplasma pneumo by PCR Not Detected    Narrative:      In the setting of a positive respiratory panel with a viral infection PLUS a negative procalcitonin without other underlying concern for bacterial infection, consider observing off antibiotics or discontinuation of antibiotics and continue supportive care. If the respiratory panel is positive for atypical bacterial infection (Bordetella pertussis, Chlamydophila pneumoniae, or Mycoplasma pneumoniae), consider antibiotic de-escalation to target atypical bacterial infection.    POC Glucose Once [[id number]]  (Abnormal) Collected: 10/26/24 1810    Specimen: Blood Updated: 10/26/24 1812     Glucose 135 mg/dL      Comment: Serial Number: [id number]:  [id number]       Blood Culture - Blood, Arm, Right [[id number]] Collected: 10/26/24 1652    Specimen: Blood from Arm, Right Updated: 10/26/24 1654    Comprehensive Metabolic Panel [[id number]]  (Abnormal) Collected: 10/26/24 1609    Specimen: Blood from Arm, Left Updated: 10/26/24 1644     Glucose 66 mg/dL      BUN 20 mg/dL      Creatinine 1.14 mg/dL      Sodium 135 mmol/L      Potassium 4.5 mmol/L      Chloride 98 mmol/L      CO2 25.0 mmol/L      Calcium 8.6 mg/dL      Total Protein 7.2 g/dL      Albumin 3.6 g/dL      ALT (SGPT) 49 U/L      AST (SGOT) 87 U/L      Alkaline Phosphatase 179 U/L      Total Bilirubin 0.6 mg/dL      Globulin 3.6 gm/dL      A/G Ratio 1.0 g/dL      BUN/Creatinine Ratio 17.5     Anion Gap 12.0 mmol/L      eGFR 57.3 mL/min/1.73      Narrative:      GFR Normal >60  Chronic Kidney Disease <60  Kidney Failure <15      C-reactive Protein [[id number]]  (Abnormal) Collected: 10/26/24 1609    Specimen: Blood from Arm, Left Updated: 10/26/24 1642     C-Reactive Protein 6.33 mg/dL     Protime-INR [[id number]]  (Normal) Collected: 10/26/24 1609    Specimen: Blood from Arm, Left Updated: 10/26/24 1630     Protime 10.3 Seconds      INR 0.94    aPTT [[id number]]  (Normal) Collected: 10/26/24 1609    Specimen: Blood from Arm, Left Updated: 10/26/24 1630     PTT 27.6 seconds     Sedimentation Rate [[id number]]  (Normal) Collected: 10/26/24 1609    Specimen: Blood from Arm, Left Updated: 10/26/24 1624     Sed Rate 24 mm/hr     CBC & Differential [[id number]]  (Abnormal) Collected: 10/26/24 1609    Specimen: Blood from Arm, Left Updated: 10/26/24 1618    Narrative:      The following orders were created for panel order CBC & Differential.  Procedure                               Abnormality         Status                     ---------                               -----------         ------                     CBC Auto Differential[[id number]]        Abnormal            Final result                 Please view results for these tests on the individual orders.    CBC Auto Differential [[id number]]  (Abnormal) Collected: 10/26/24 1609    Specimen: Blood from Arm, Left Updated: 10/26/24 1618     WBC 5.36 10*3/mm3      RBC 3.86 10*6/mm3      Hemoglobin 11.0 g/dL      Hematocrit 34.6 %      MCV 89.6 fL      MCH 28.5 pg      MCHC 31.8 g/dL      RDW 14.9 %      RDW-SD 49.1 fl      MPV 10.6 fL      Platelets 346 10*3/mm3      Neutrophil % 59.6 %      Lymphocyte % 24.4 %      Monocyte % 10.8 %      Eosinophil % 1.3 %      Basophil % 1.1 %      Immature Grans % 2.8 %      Neutrophils, Absolute 3.19 10*3/mm3      Lymphocytes, Absolute 1.31 10*3/mm3      Monocytes, Absolute 0.58 10*3/mm3      Eosinophils, Absolute 0.07 10*3/mm3      Basophils, Absolute 0.06 10*3/mm3      Immature  Grans, Absolute 0.15 10*3/mm3      nRBC 0.0 /100 WBC     POC Lactate [[id number]]  (Normal) Collected: 10/26/24 1615    Specimen: Blood Updated: 10/26/24 1616     Lactate 1.6 mmol/L      Comment: Serial Number: [id number]:  [id number]       Blood Culture - Blood, Arm, Left [[id number]] Collected: 10/26/24 1609    Specimen: Blood from Arm, Left Updated: 10/26/24 1615             Imaging Results (Last 24 Hours)       Procedure Component Value Units Date/Time    XR Foot 3+ View Left [[id number]] Collected: 10/26/24 1554     Updated: 10/26/24 1558    Narrative:      XR FOOT 3+ VW LEFT    Date of Exam: 10/26/2024 3:43 PM EDT    Indication: redness, swelling, open draining wound 2nd toe    Comparison: None available.    Findings:  There is diffuse soft tissue swelling. Patient is status post amputation of the first digit. There is marked soft tissue swelling of the second and third digits but no definite gas seen within the soft tissues. No bony destruction or abnormal periosteal   reaction identified. No radiopaque foreign bodies are seen. There is degenerative spurring along the dorsal aspect of the tarsal bones. Tarsal bones are otherwise unremarkable.      Impression:      Impression:    1. Amputation of the first digit.  2. Soft tissue swelling of the foot and marked soft tissue swelling of the second and third digits. No radiopaque foreign body or gas within the soft tissues.  3. No conventional radiographic evidence of osteomyelitis.      Electronically Signed: Fredy Stinson MD    10/26/2024 3:56 PM EDT    Workstation ID: [id number]              Assessment & Plan        This is a 54 y.o. female with:    Active and Resolved Problems  Active Hospital Problems    Diagnosis  POA    **Cellulitis of left foot [L03.116]  Yes      Resolved Hospital Problems   No resolved problems to display.       Assessment and plan    Cellulitis of left second toe  -Continue on vancomycin and Zosyn for pseudomonal coverage  -Soft  tissue swelling however no radiographic evidence of osteomyelitis.  Defer to podiatry for further evaluation of osteomyelitis  -Podiatry consult  -Tylenol 1 g every 6 hours for pain and Dilaudid 0.25 mg every 6 hours for breakthrough pain  -Home dose of gabapentin  -PT OT eval    ALYSE likely prerenal due to dehydration  -NS at 75 cc/h  -Check BMP in the a.m.    History of diabetes type 2  -Hold home dose of antidiabetic regimen, start on Lantus 10 units with insulin sliding scale    History of hypertension  -Hold home dose of lisinopril 20 mg daily and reevaluate in the a.m. due to ALYSE    History of hypothyroidism  -Continue on levothyroxine 200 mcg daily    History of mood disorder  -Resume Wellbutrin 300 mg daily, Depakote 500 mg daily.  -Resume other home meds after pharmacy verification    History of asthma  Resume home bronchodilators        VTE Prophylaxis:  Pharmacologic VTE prophylaxis orders are present.        The patient desires to be as follows:    CODE STATUS:    Code Status (Patient has no pulse and is not breathing): CPR (Attempt to Resuscitate)  Medical Interventions (Patient has pulse or is breathing): Full Support        Tomas Abbott, who can be contacted at [phone], is the designated person to make medical decisions on the patient's behalf if She is incapable of doing so. This was clarified with patient and/or next of kin on 10/26/2024 during the course of this H&P.    Admission Status:  I believe this patient meets inpatient status.    Expected Length of Stay: greater than 2 nights    PDMP and Medication Dispenses via Sidebar reviewed and consistent with patient reported medications.    I discussed the patient's findings and my recommendations with patient.      Signature:     This document has been electronically signed by Etienne Aguilar MD on October 26, 2024 19:32 EDT   [facility]

## 2024-10-26 NOTE — Clinical Note
Level of Care: Med/Surg [1]   Admitting Physician: CRISTHIAN RAMOS [[id number]]   Attending Physician: CRISTHIAN RAMOS [[id number]]

## 2024-10-27 LAB
ALBUMIN SERPL-MCNC: 3.3 G/DL (ref 3.5–5.2)
ALBUMIN/GLOB SERPL: 1 G/DL
ALP SERPL-CCNC: 178 U/L (ref 39–117)
ALT SERPL W P-5'-P-CCNC: 45 U/L (ref 1–33)
ANION GAP SERPL CALCULATED.3IONS-SCNC: 9.4 MMOL/L (ref 5–15)
AST SERPL-CCNC: 78 U/L (ref 1–32)
BASOPHILS # BLD AUTO: 0.06 10*3/MM3 (ref 0–0.2)
BASOPHILS NFR BLD AUTO: 1.1 % (ref 0–1.5)
BILIRUB SERPL-MCNC: 0.6 MG/DL (ref 0–1.2)
BUN SERPL-MCNC: 15 MG/DL (ref 6–20)
BUN/CREAT SERPL: 17.2 (ref 7–25)
CALCIUM SPEC-SCNC: 8.4 MG/DL (ref 8.6–10.5)
CHLORIDE SERPL-SCNC: 101 MMOL/L (ref 98–107)
CO2 SERPL-SCNC: 22.6 MMOL/L (ref 22–29)
CREAT SERPL-MCNC: 0.87 MG/DL (ref 0.57–1)
DEPRECATED RDW RBC AUTO: 49.8 FL (ref 37–54)
EGFRCR SERPLBLD CKD-EPI 2021: 79.3 ML/MIN/1.73
EOSINOPHIL # BLD AUTO: 0.14 10*3/MM3 (ref 0–0.4)
EOSINOPHIL NFR BLD AUTO: 2.6 % (ref 0.3–6.2)
ERYTHROCYTE [DISTWIDTH] IN BLOOD BY AUTOMATED COUNT: 15 % (ref 12.3–15.4)
GLOBULIN UR ELPH-MCNC: 3.3 GM/DL
GLUCOSE BLDC GLUCOMTR-MCNC: 140 MG/DL (ref 70–105)
GLUCOSE BLDC GLUCOMTR-MCNC: 216 MG/DL (ref 70–105)
GLUCOSE BLDC GLUCOMTR-MCNC: 257 MG/DL (ref 70–105)
GLUCOSE BLDC GLUCOMTR-MCNC: 291 MG/DL (ref 70–105)
GLUCOSE SERPL-MCNC: 219 MG/DL (ref 65–99)
HBA1C MFR BLD: 8.06 % (ref 4.8–5.6)
HCT VFR BLD AUTO: 30.3 % (ref 34–46.6)
HGB BLD-MCNC: 9.7 G/DL (ref 12–15.9)
IMM GRANULOCYTES # BLD AUTO: 0.15 10*3/MM3 (ref 0–0.05)
IMM GRANULOCYTES NFR BLD AUTO: 2.7 % (ref 0–0.5)
LYMPHOCYTES # BLD AUTO: 1.64 10*3/MM3 (ref 0.7–3.1)
LYMPHOCYTES NFR BLD AUTO: 29.9 % (ref 19.6–45.3)
MAGNESIUM SERPL-MCNC: 2.6 MG/DL (ref 1.6–2.6)
MCH RBC QN AUTO: 28.8 PG (ref 26.6–33)
MCHC RBC AUTO-ENTMCNC: 32 G/DL (ref 31.5–35.7)
MCV RBC AUTO: 89.9 FL (ref 79–97)
MONOCYTES # BLD AUTO: 0.68 10*3/MM3 (ref 0.1–0.9)
MONOCYTES NFR BLD AUTO: 12.4 % (ref 5–12)
NEUTROPHILS NFR BLD AUTO: 2.81 10*3/MM3 (ref 1.7–7)
NEUTROPHILS NFR BLD AUTO: 51.3 % (ref 42.7–76)
NRBC BLD AUTO-RTO: 0 /100 WBC (ref 0–0.2)
PHOSPHATE SERPL-MCNC: 2.9 MG/DL (ref 2.5–4.5)
PLATELET # BLD AUTO: 352 10*3/MM3 (ref 140–450)
PMV BLD AUTO: 10.5 FL (ref 6–12)
POTASSIUM SERPL-SCNC: 4.9 MMOL/L (ref 3.5–5.2)
PROT SERPL-MCNC: 6.6 G/DL (ref 6–8.5)
RBC # BLD AUTO: 3.37 10*6/MM3 (ref 3.77–5.28)
SODIUM SERPL-SCNC: 133 MMOL/L (ref 136–145)
WBC NRBC COR # BLD AUTO: 5.48 10*3/MM3 (ref 3.4–10.8)

## 2024-10-27 PROCEDURE — 83036 HEMOGLOBIN GLYCOSYLATED A1C: CPT

## 2024-10-27 PROCEDURE — 25810000003 SODIUM CHLORIDE 0.9 % SOLUTION

## 2024-10-27 PROCEDURE — 84100 ASSAY OF PHOSPHORUS: CPT

## 2024-10-27 PROCEDURE — 83735 ASSAY OF MAGNESIUM: CPT

## 2024-10-27 PROCEDURE — 63710000001 INSULIN GLARGINE PER 5 UNITS: Performed by: HOSPITALIST

## 2024-10-27 PROCEDURE — 94761 N-INVAS EAR/PLS OXIMETRY MLT: CPT

## 2024-10-27 PROCEDURE — 63710000001 INSULIN LISPRO (HUMAN) PER 5 UNITS: Performed by: HOSPITALIST

## 2024-10-27 PROCEDURE — 80053 COMPREHEN METABOLIC PANEL: CPT

## 2024-10-27 PROCEDURE — 85025 COMPLETE CBC W/AUTO DIFF WBC: CPT

## 2024-10-27 PROCEDURE — 94664 DEMO&/EVAL PT USE INHALER: CPT

## 2024-10-27 PROCEDURE — 82948 REAGENT STRIP/BLOOD GLUCOSE: CPT

## 2024-10-27 PROCEDURE — 94799 UNLISTED PULMONARY SVC/PX: CPT

## 2024-10-27 PROCEDURE — 63710000001 INSULIN LISPRO (HUMAN) PER 5 UNITS

## 2024-10-27 PROCEDURE — 94660 CPAP INITIATION&MGMT: CPT

## 2024-10-27 PROCEDURE — 25010000002 HEPARIN (PORCINE) PER 1000 UNITS

## 2024-10-27 PROCEDURE — 25010000002 PIPERACILLIN SOD-TAZOBACTAM PER 1 G

## 2024-10-27 PROCEDURE — 97162 PT EVAL MOD COMPLEX 30 MIN: CPT

## 2024-10-27 RX ORDER — ZIPRASIDONE HYDROCHLORIDE 20 MG/1
60 CAPSULE ORAL NIGHTLY
Status: DISCONTINUED | OUTPATIENT
Start: 2024-10-27 | End: 2024-11-01 | Stop reason: HOSPADM

## 2024-10-27 RX ORDER — DEXTROSE MONOHYDRATE 25 G/50ML
25 INJECTION, SOLUTION INTRAVENOUS
Status: DISCONTINUED | OUTPATIENT
Start: 2024-10-27 | End: 2024-11-01 | Stop reason: HOSPADM

## 2024-10-27 RX ORDER — INSULIN LISPRO 100 [IU]/ML
7 INJECTION, SOLUTION INTRAVENOUS; SUBCUTANEOUS
Status: DISCONTINUED | OUTPATIENT
Start: 2024-10-27 | End: 2024-10-28

## 2024-10-27 RX ORDER — BENZONATATE 100 MG/1
100 CAPSULE ORAL 3 TIMES DAILY PRN
Status: DISCONTINUED | OUTPATIENT
Start: 2024-10-27 | End: 2024-11-01 | Stop reason: HOSPADM

## 2024-10-27 RX ORDER — NICOTINE POLACRILEX 4 MG
15 LOZENGE BUCCAL
Status: DISCONTINUED | OUTPATIENT
Start: 2024-10-27 | End: 2024-11-01 | Stop reason: HOSPADM

## 2024-10-27 RX ORDER — INSULIN LISPRO 100 [IU]/ML
3-14 INJECTION, SOLUTION INTRAVENOUS; SUBCUTANEOUS
Status: DISCONTINUED | OUTPATIENT
Start: 2024-10-27 | End: 2024-11-01 | Stop reason: HOSPADM

## 2024-10-27 RX ADMIN — GABAPENTIN 300 MG: 300 CAPSULE ORAL at 13:43

## 2024-10-27 RX ADMIN — BUDESONIDE AND FORMOTEROL FUMARATE DIHYDRATE 2 PUFF: 160; 4.5 AEROSOL RESPIRATORY (INHALATION) at 19:53

## 2024-10-27 RX ADMIN — Medication 10 ML: at 21:06

## 2024-10-27 RX ADMIN — PIPERACILLIN AND TAZOBACTAM 3.38 G: 3; .375 INJECTION, POWDER, FOR SOLUTION INTRAVENOUS at 21:06

## 2024-10-27 RX ADMIN — PANTOPRAZOLE SODIUM 40 MG: 40 TABLET, DELAYED RELEASE ORAL at 06:15

## 2024-10-27 RX ADMIN — PIPERACILLIN AND TAZOBACTAM 3.38 G: 3; .375 INJECTION, POWDER, FOR SOLUTION INTRAVENOUS at 06:15

## 2024-10-27 RX ADMIN — INSULIN LISPRO 6 UNITS: 100 INJECTION, SOLUTION INTRAVENOUS; SUBCUTANEOUS at 12:03

## 2024-10-27 RX ADMIN — INSULIN GLARGINE 30 UNITS: 100 INJECTION, SOLUTION SUBCUTANEOUS at 15:28

## 2024-10-27 RX ADMIN — INSULIN LISPRO 7 UNITS: 100 INJECTION, SOLUTION INTRAVENOUS; SUBCUTANEOUS at 17:27

## 2024-10-27 RX ADMIN — Medication 10 ML: at 08:49

## 2024-10-27 RX ADMIN — HEPARIN SODIUM 5000 UNITS: 5000 INJECTION INTRAVENOUS; SUBCUTANEOUS at 21:06

## 2024-10-27 RX ADMIN — HEPARIN SODIUM 5000 UNITS: 5000 INJECTION INTRAVENOUS; SUBCUTANEOUS at 06:15

## 2024-10-27 RX ADMIN — GABAPENTIN 300 MG: 300 CAPSULE ORAL at 21:06

## 2024-10-27 RX ADMIN — LISINOPRIL 20 MG: 20 TABLET ORAL at 08:48

## 2024-10-27 RX ADMIN — BENZONATATE 100 MG: 100 CAPSULE ORAL at 12:03

## 2024-10-27 RX ADMIN — INSULIN LISPRO 5 UNITS: 100 INJECTION, SOLUTION INTRAVENOUS; SUBCUTANEOUS at 17:26

## 2024-10-27 RX ADMIN — ZIPRASIDONE HYDROCHLORIDE 60 MG: 20 CAPSULE ORAL at 21:15

## 2024-10-27 RX ADMIN — ZIPRASIDONE HYDROCHLORIDE 60 MG: 20 CAPSULE ORAL at 01:40

## 2024-10-27 RX ADMIN — INSULIN LISPRO 8 UNITS: 100 INJECTION, SOLUTION INTRAVENOUS; SUBCUTANEOUS at 21:05

## 2024-10-27 RX ADMIN — INSULIN GLARGINE 30 UNITS: 100 INJECTION, SOLUTION SUBCUTANEOUS at 21:14

## 2024-10-27 RX ADMIN — LEVOTHYROXINE SODIUM 200 MCG: 0.1 TABLET ORAL at 06:15

## 2024-10-27 RX ADMIN — HEPARIN SODIUM 5000 UNITS: 5000 INJECTION INTRAVENOUS; SUBCUTANEOUS at 13:43

## 2024-10-27 RX ADMIN — BUPROPION HYDROCHLORIDE 300 MG: 150 TABLET, EXTENDED RELEASE ORAL at 08:48

## 2024-10-27 RX ADMIN — GABAPENTIN 300 MG: 300 CAPSULE ORAL at 06:15

## 2024-10-27 RX ADMIN — PIPERACILLIN AND TAZOBACTAM 3.38 G: 3; .375 INJECTION, POWDER, FOR SOLUTION INTRAVENOUS at 13:43

## 2024-10-27 RX ADMIN — DIVALPROEX SODIUM 1500 MG: 500 TABLET, EXTENDED RELEASE ORAL at 21:06

## 2024-10-27 RX ADMIN — BUDESONIDE AND FORMOTEROL FUMARATE DIHYDRATE 2 PUFF: 160; 4.5 AEROSOL RESPIRATORY (INHALATION) at 07:21

## 2024-10-27 RX ADMIN — SODIUM CHLORIDE 75 ML/HR: 9 INJECTION, SOLUTION INTRAVENOUS at 13:44

## 2024-10-27 RX ADMIN — BENZONATATE 100 MG: 100 CAPSULE ORAL at 04:06

## 2024-10-27 NOTE — PLAN OF CARE
Goal Outcome Evaluation:  Plan of Care Reviewed With: patient, spouse           Outcome Evaluation: Marilou is a 55 y/o F with a  history of insulin-dependent diabetes who presents to [facility] for complaints of increasing redness and swelling to her left second toe. At baseline, she is indep with all ADLs/IADLs, drives, and works. She lives with her  in a home that has 2 GOPAL. At time of entry, she is AOx4 and agreeable to participate in initial PT eval. Pt mod-indep with bed mobility, used bed rails for supine-to-sit tf. She is SBA for all tfs. BLE ROM and strength WFL for ADLs and amb. C/o n/t in bilat feet and R hip due to neruropathy. Amb ~60 ft with L medical shoe, SBA. Decreased gait speed, wide RODERICK, excessive L ER, decrease LUE arm swing. Pt is at baseline functional mobility. PT services not necessary at this time. Recommending d/c home when medically appropriate.    Anticipated Discharge Disposition (PT): home

## 2024-10-27 NOTE — PROGRESS NOTES
[facility] MEDICINE SERVICE  DAILY PROGRESS NOTE    NAME: Marilou Abbott  : 1970  MRN: [id number]      LOS: 1 day     PROVIDER OF SERVICE: Romel Hendricks MD    Chief Complaint: Cellulitis of left foot    Subjective:     Interval History:  History taken from: patient chart RN  Afebrile pain controlled         Review of Systems:   Review of Systems  All negative except as above   Objective:     Vital Signs  Temp:  [97.7 °F (36.5 °C)-98.1 °F (36.7 °C)] 97.7 °F (36.5 °C)  Heart Rate:  [78-95] 88  Resp:  [16-20] 17  BP: (116-154)/(49-80) 146/77   Body mass index is 39.53 kg/m².    Physical Exam  Physical Exam  AOx3 NAD  RRR S1-S2 audible  Lungs with fair air entry  Abdomen soft nontender nondistended left foot second digit erythema swollen with erythema tender to touch     Diagnostic Data    Results from last 7 days   Lab Units 10/27/24  0953 10/27/24  0607   WBC 10*3/mm3  --  5.48   HEMOGLOBIN g/dL  --  9.7*   HEMATOCRIT %  --  30.3*   PLATELETS 10*3/mm3  --  352   GLUCOSE mg/dL 219*  --    CREATININE mg/dL 0.87  --    BUN mg/dL 15  --    SODIUM mmol/L 133*  --    POTASSIUM mmol/L 4.9  --    AST (SGOT) U/L 78*  --    ALT (SGPT) U/L 45*  --    ALK PHOS U/L 178*  --    BILIRUBIN mg/dL 0.6  --    ANION GAP mmol/L 9.4  --        XR Foot 3+ View Left    Result Date: 10/26/2024  Impression: 1. Amputation of the first digit. 2. Soft tissue swelling of the foot and marked soft tissue swelling of the second and third digits. No radiopaque foreign body or gas within the soft tissues. 3. No conventional radiographic evidence of osteomyelitis. Electronically Signed: Fredy Stinson MD  10/26/2024 3:56 PM EDT  Workstation ID: [id number]       I reviewed the patient's new clinical results.  I reviewed the patient's new imaging results and agree with the interpretation.    Assessment/Plan:     Active and Resolved Problems  Active Hospital Problems    Diagnosis  POA    **Cellulitis of left foot [L03.116]  Yes       Resolved Hospital Problems   No resolved problems to display.       #Left foot second digit wound  X-ray with no evidence of osteomyelitis  Podiatry consulted  Continue vancomycin and Zosyn for now  Continue current pain control  Defer need of MRI to podiatry    #Mild increase in creatinine  No evidence of ALYSE  BMP daily    #IDDM  Increase Lantus to 30 units twice daily  Lispro 7 units 3 times daily scheduled  ISS lispro moderate to high correctional  Diabetic diet    #Hypertension  Continue home dose of lisinopril    #Hypothyroidism  Continue home dose of levothyroxine    VTE Prophylaxis:  Pharmacologic VTE prophylaxis orders are present.             Disposition Planning:     Barriers to Discharge: Medical workup  Anticipated Date of Discharge: TBD  Place of Discharge: Home      Time: 45 minutes     Code Status and Medical Interventions: CPR (Attempt to Resuscitate); Full Support   Ordered at: 10/26/24 1826     Code Status (Patient has no pulse and is not breathing):    CPR (Attempt to Resuscitate)     Medical Interventions (Patient has pulse or is breathing):    Full Support       Signature: Electronically signed by Romel Hendricks MD, 10/27/24, 13:18 EDT.  [facility] Hospitalist Team

## 2024-10-27 NOTE — ED NOTES
Nursing report ED to floor  Marilou VILCHIS Abbott  54 y.o.  female    HPI:   Chief Complaint   Patient presents with    Wound Infection     Left foot 2nd toe wound.  Pt states has been there since June is going to wound clinic, looks worse today       Admitting doctor:   Etienne Aguilar MD    Admitting diagnosis:   The primary encounter diagnosis was Cellulitis of left foot. 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    Code status:   Current Code Status       Date Active Code Status Order ID Comments User Context       10/26/2024 1826 CPR (Attempt to Resuscitate) [id number]  Etienne Aguilar MD ED        Question Answer    Code Status (Patient has no pulse and is not breathing) CPR (Attempt to Resuscitate)    Medical Interventions (Patient has pulse or is breathing) Full Support                    Allergies:   Dust mite extract, Molds & smuts, Bactrim [sulfamethoxazole-trimethoprim], and Trimethoprim    Isolation:  No active isolations     Fall Risk:  Fall Risk Assessment was completed, and patient is at low risk for falls.   Predictive Model Details         12 (Low) Factor Value    Calculated 10/26/2024 20:36 Age 54    Risk of Fall Model Active Peripheral IV Present     Imaging order in this encounter Present     Diastolic BP 49     Respiratory Rate 18     Magnesium not on file     Number of Distinct Medication Classes administered 4     Calcium 8.6 mg/dL     Seun Scale not on file     Albumin 3.6 g/dL     Chloride 98 mmol/L     Creatinine 1.14 mg/dL     ALT 49 U/L     Total Bilirubin 0.6 mg/dL     Days after Admission 0.23     Potassium 4.5 mmol/L         Weight:       10/26/24  1505   Weight: 118 kg (260 lb)       Intake and Output    Intake/Output Summary (Last 24 hours) at 10/26/2024 2037  Last data filed at 10/26/2024 1738  Gross per 24 hour   Intake 100 ml   Output --    Net 100 ml       Diet:   Dietary Orders (From admission, onward)       Start     Ordered    10/26/24 1813  Diet: Diabetic; Consistent Carbohydrate; Fluid Consistency: Thin (IDDSI 0)  Diet Effective Now        References:    Diet Order [place]   Question Answer Comment   Diets: Diabetic    Diabetic Diet: Consistent Carbohydrate    Fluid Consistency: Thin (IDDSI 0)        10/26/24 1812                     Most recent vitals:   Vitals:    10/26/24 1941 10/26/24 2001 10/26/24 2016 10/26/24 2031   BP: 154/61 116/51 118/54 126/49   BP Location: Right arm      Patient Position: Sitting      Pulse: 92 89 95 88   Resp: 18 18     Temp: 97.7 °F (36.5 °C)      TempSrc: Oral      SpO2: 100% 100% 99% 99%   Weight:       Height:           Active LDAs/IV Access:   Lines, Drains & Airways       Active LDAs       Name Placement date Placement time Site Days    Peripheral IV 10/26/24 1609 Left Antecubital 10/26/24  1609  Antecubital  less than 1                    Skin Condition:   Skin Assessments (last day)       None             Labs (abnormal labs have a star):   Labs Reviewed   COMPREHENSIVE METABOLIC PANEL - Abnormal; Notable for the following components:       Result Value    Creatinine 1.14 (*)     Sodium 135 (*)     ALT (SGPT) 49 (*)     AST (SGOT) 87 (*)     Alkaline Phosphatase 179 (*)     eGFR 57.3 (*)     All other components within normal limits    Narrative:     GFR Normal >60  Chronic Kidney Disease <60  Kidney Failure <15     C-REACTIVE PROTEIN - Abnormal; Notable for the following components:    C-Reactive Protein 6.33 (*)     All other components within normal limits   CBC WITH AUTO DIFFERENTIAL - Abnormal; Notable for the following components:    Hemoglobin 11.0 (*)     Immature Grans % 2.8 (*)     Immature Grans, Absolute 0.15 (*)     All other components within normal limits   POCT GLUCOSE FINGERSTICK - Abnormal; Notable for the following components:    Glucose 135 (*)     All other components within normal  limits   RESPIRATORY PANEL PCR W/ COVID-19 (SARS-COV-2), NP SWAB IN UTM/VTP, 2 HR TAT - Normal    Narrative:     In the setting of a positive respiratory panel with a viral infection PLUS a negative procalcitonin without other underlying concern for bacterial infection, consider observing off antibiotics or discontinuation of antibiotics and continue supportive care. If the respiratory panel is positive for atypical bacterial infection (Bordetella pertussis, Chlamydophila pneumoniae, or Mycoplasma pneumoniae), consider antibiotic de-escalation to target atypical bacterial infection.   PROTIME-INR - Normal   APTT - Normal   SEDIMENTATION RATE - Normal   TSH RFX ON ABNORMAL TO FREE T4 - Normal   CK - Normal   POC LACTATE - Normal   BLOOD CULTURE   BLOOD CULTURE   POCT GLUCOSE FINGERSTICK   POCT GLUCOSE FINGERSTICK   POCT GLUCOSE FINGERSTICK   POCT GLUCOSE FINGERSTICK   CBC AND DIFFERENTIAL    Narrative:     The following orders were created for panel order CBC & Differential.  Procedure                               Abnormality         Status                     ---------                               -----------         ------                     CBC Auto Differential[[id number]]        Abnormal            Final result                 Please view results for these tests on the individual orders.       LOC: Person, Place, Time, and Situation    Telemetry:  Med/Surg    Cardiac Monitoring Ordered: no    EKG:   No orders to display       Medications Given in the ED:   Medications   sodium chloride 0.9 % flush 10 mL (has no administration in time range)   sodium chloride 0.9 % flush 10 mL (has no administration in time range)   sodium chloride 0.9 % flush 10 mL (has no administration in time range)   sodium chloride 0.9 % infusion 40 mL (has no administration in time range)   heparin (porcine) 5000 UNIT/ML injection 5,000 Units (has no administration in time range)   nitroglycerin (NITROSTAT) SL tablet 0.4 mg (has no  administration in time range)   Potassium Replacement - Follow Nurse / BPA Driven Protocol (has no administration in time range)   Magnesium Standard Dose Replacement - Follow Nurse / BPA Driven Protocol (has no administration in time range)   Phosphorus Replacement - Follow Nurse / BPA Driven Protocol (has no administration in time range)   Calcium Replacement - Follow Nurse / BPA Driven Protocol (has no administration in time range)   sennosides-docusate (PERICOLACE) 8.6-50 MG per tablet 2 tablet (has no administration in time range)     And   polyethylene glycol (MIRALAX) packet 17 g (has no administration in time range)     And   bisacodyl (DULCOLAX) EC tablet 5 mg (has no administration in time range)     And   bisacodyl (DULCOLAX) suppository 10 mg (has no administration in time range)   Pharmacy to Dose Zosyn (has no administration in time range)   piperacillin-tazobactam (ZOSYN) 3.375 g IVPB in 100 mL NS MBP (CD) (has no administration in time range)   albuterol (PROVENTIL) nebulizer solution 0.083% 2.5 mg/3mL (has no administration in time range)   budesonide-formoterol (SYMBICORT) 160-4.5 MCG/ACT inhaler 2 puff (2 puffs Inhalation Given 10/26/24 2001)   buPROPion XL (WELLBUTRIN XL) 24 hr tablet 300 mg (has no administration in time range)   levothyroxine (SYNTHROID, LEVOTHROID) tablet 200 mcg (has no administration in time range)   lisinopril (PRINIVIL,ZESTRIL) tablet 20 mg (has no administration in time range)   pantoprazole (PROTONIX) EC tablet 40 mg (has no administration in time range)   gabapentin (NEURONTIN) capsule 300 mg (has no administration in time range)   divalproex (DEPAKOTE ER) 24 hr tablet 1,500 mg (has no administration in time range)   acetaminophen (TYLENOL) tablet 1,000 mg (has no administration in time range)     Or   acetaminophen (TYLENOL) 160 MG/5ML oral solution 650 mg (has no administration in time range)     Or   acetaminophen (TYLENOL) suppository 650 mg (has no administration  in time range)   dextrose (GLUTOSE) oral gel 15 g (has no administration in time range)   dextrose (D50W) (25 g/50 mL) IV injection 25 g (has no administration in time range)   glucagon (GLUCAGEN) injection 1 mg (has no administration in time range)   insulin glargine (LANTUS, SEMGLEE) injection 10 Units (has no administration in time range)   insulin lispro (HUMALOG/ADMELOG) injection 2-9 Units (has no administration in time range)   sodium chloride 0.9 % infusion (has no administration in time range)   HYDROmorphone (DILAUDID) injection 0.25 mg (has no administration in time range)   cefTRIAXone (ROCEPHIN) 2,000 mg in sodium chloride 0.9 % 100 mL MBP (0 mg Intravenous Stopped 10/26/24 1736)   vancomycin IVPB 2000 mg in 0.9% Sodium Chloride 500 mL (0 mg Intravenous Stopped 10/26/24 2033)   ipratropium-albuterol (DUO-NEB) nebulizer solution 3 mL (3 mL Nebulization Given 10/26/24 1708)       Imaging results:  XR Foot 3+ View Left    Result Date: 10/26/2024  Impression: 1. Amputation of the first digit. 2. Soft tissue swelling of the foot and marked soft tissue swelling of the second and third digits. No radiopaque foreign body or gas within the soft tissues. 3. No conventional radiographic evidence of osteomyelitis. Electronically Signed: Fredy Stinson MD  10/26/2024 3:56 PM EDT  Workstation ID: [id number]     Social issues:   Social History     Socioeconomic History    Marital status:    Tobacco Use    Smoking status: Never    Smokeless tobacco: Never   Vaping Use    Vaping status: Never Used   Substance and Sexual Activity    Alcohol use: No    Drug use: No    Sexual activity: Defer       NIH Stroke Scale:  Interval: (not recorded)  1a. Level of Consciousness: (not recorded)  1b. LOC Questions: (not recorded)  1c. LOC Commands: (not recorded)  2. Best Gaze: (not recorded)  3. Visual: (not recorded)  4. Facial Palsy: (not recorded)  5a. Motor Arm, Left: (not recorded)  5b. Motor Arm, Right: (not recorded)  6a.  Motor Leg, Left: (not recorded)  6b. Motor Leg, Right: (not recorded)  7. Limb Ataxia: (not recorded)  8. Sensory: (not recorded)  9. Best Language: (not recorded)  10. Dysarthria: (not recorded)  11. Extinction and Inattention (formerly Neglect): (not recorded)    Total (NIH Stroke Scale): (not recorded)     Additional notable assessment information:     Nursing report ED to floor:  Vivian Otto RN   10/26/24 20:37 EDT

## 2024-10-27 NOTE — PLAN OF CARE
Goal Outcome Evaluation:  Plan of Care Reviewed With: patient, spouse        Progress: no change        Pt alert and able to make needs known. Pt VS stable. Pt with no complaints of pain this shift. Personal items and call light within reach.

## 2024-10-27 NOTE — THERAPY EVALUATION
Patient Name: Marilou Abbott  : 1970    MRN: [id number]                              Today's Date: 10/27/2024       Admit Date: 10/26/2024    Visit Dx:     ICD-10-CM ICD-9-CM   1. Cellulitis of left foot  L03.116 682.7   2. Diabetic ulcer of toe of left foot associated with diabetes mellitus of other type, unspecified ulcer stage  E13.621 250.80    L97.529 707.15   3. History of diabetes mellitus  Z86.39 V12.29     Patient Active Problem List   Diagnosis    Diabetic peripheral neuropathy    Meralgia paresthetica of right side    Hip pain, chronic, right    Asthma, intermittent, with acute exacerbation    Cellulitis    Cellulitis of toe    Gastroesophageal reflux disease    Acquired hypothyroidism    Obesity    Type 2 diabetes mellitus with hyperglycemia, with long-term current use of insulin    Pain of right lower extremity    Pain in right hip    Bilateral presbyopia    Conjunctivitis    Herpes simplex with ophthalmic complication    Regular astigmatism    Astigmatism, bilateral    Regular astigmatism of both eyes    Type 2 or unspecified type diabetes mellitus    Essential hypertension    Anxiety state    Neuropathy    Benign essential hypertension    Asthma    Depressive disorder    Obstructive sleep apnea syndrome    Severe bipolar disorder without psychotic features    Hypothyroidism    Morbid obesity    Type 2 diabetes mellitus    Cellulitis of left foot     Past Medical History:   Diagnosis Date    Anxiety     Asthma     Bruise     On stomach for 3 months    Depression     Diabetes mellitus     Peripheral neuropathy     Sleep apnea     Thyroid disease     Type 2 diabetes mellitus      Past Surgical History:   Procedure Laterality Date    AMPUTATION      CHOLECYSTECTOMY      GASTRIC BYPASS      LEG SURGERY      THYROIDECTOMY        General Information       Row Name 10/27/24 7782          Physical Therapy Time and Intention    Document Type evaluation  -SS (r) AN (t) SS (c)     Mode of Treatment physical  therapy  -SS (r) AN (t) SS (c)       Row Name 10/27/24 1352          General Information    Patient Profile Reviewed yes  -SS (r) AN (t) SS (c)     Prior Level of Function independent:;ADL's;home management;driving;work  -SS (r) AN (t) SS (c)     Barriers to Rehab none identified  -SS (r) AN (t) SS (c)       Row Name 10/27/24 1352          Living Environment    People in Home significant other  -SS (r) AN (t) SS (c)       Row Name 10/27/24 1352          Home Main Entrance    Number of Stairs, Main Entrance two  -SS (r) AN (t) SS (c)               User Key  (r) = Recorded By, (t) = Taken By, (c) = Cosigned By      Initials Name Provider Type    SS Laverne Orourke, PT Physical Therapist    Yoli Pina, PT Student PT Student                   Mobility       Row Name 10/27/24 1354          Bed Mobility    Bed Mobility bed mobility (all) activities  -SS (r) AN (t) SS (c)     All Activities, [place] (Bed Mobility) modified independence  -SS (r) AN (t) SS (c)     Assistive Device (Bed Mobility) bed rails;head of bed elevated  -SS (r) AN (t) SS (c)       Row Name 10/27/24 1354          Bed-Chair Transfer    Bed-Chair [place] (Transfers) supervision  -SS (r) AN (t) SS (c)       Row Name 10/27/24 [facility]          Sit-Stand Transfer    Sit-Stand [place] (Transfers) standby assist  -SS (r) AN (t) SS (c)       Row Name 10/27/24 [facility]          Gait/Stairs (Locomotion)    [place] Level (Gait) independent  -SS (r) AN (t) SS (c)     Patient was able to Ambulate yes  -SS (r) AN (t) SS (c)     Distance in Feet (Gait) 60  -SS (r) AN (t) SS (c)     Deviations/Abnormal Patterns (Gait) base of support, wide;stride length decreased;other (see comments)  decreased arm swing LUE, excessive ER LLE  -SS (r) AN (t) SS (c)               User Key  (r) = Recorded By, (t) = Taken By, (c) = Cosigned By      Initials Name Provider Type    SS Laverne Orourke, PT Physical Therapist    Yoli Pina, PT Student PT Student                    Obj/Interventions       Row Name 10/27/24 1408          Range of Motion Comprehensive    General Range of Motion bilateral lower extremity ROM WFL  -SS (r) AN (t) SS (c)     Comment, General Range of Motion BLE ROM WFL for ADLs  -SS (r) AN (t) SS (c)       Row Name 10/27/24 1408          Strength Comprehensive (MMT)    General Manual Muscle Testing (MMT) Assessment no strength deficits identified  -SS (r) AN (t) SS (c)     Comment, General Manual Muscle Testing (MMT) Assessment BLE strength WFL for ADLs and amb  -SS (r) AN (t) SS (c)       Row Name 10/27/24 1408          Sensory Assessment (Somatosensory)    Sensory Assessment (Somatosensory) other (see comments)  bilat N/T feet, c/o numbness in R hip as well  -SS (r) AN (t) SS (c)               User Key  (r) = Recorded By, (t) = Taken By, (c) = Cosigned By      Initials Name Provider Type    SS Laverne Orourke, PT Physical Therapist    Yoli Pina, POLI Student PT Student                   Goals/Plan    No documentation.                  Clinical Impression       Row Name 10/27/24 1412          Pain    Pain Location foot  -SS (r) AN (t) SS (c)     Pain Side/Orientation left  -SS (r) AN (t) SS (c)     Additional Documentation Pain Scale: FACES Pre/Post-Treatment (Group)  -SS (r) AN (t) SS (c)       Row Name 10/27/24 1412          Pain Scale: FACES Pre/Post-Treatment    Pain: FACES Scale, Pretreatment 2-->hurts little bit  -SS (r) AN (t) SS (c)     Posttreatment Pain Rating 2-->hurts little bit  -SS (r) AN (t) SS (c)       Row Name 10/27/24 1412          Plan of Care Review    Plan of Care Reviewed With patient;spouse  -SS (r) AN (t) SS (c)     Outcome Evaluation Marilou is a 55 y/o F with a  history of insulin-dependent diabetes who presents to [facility] for complaints of increasing redness and swelling to her left second toe. At baseline, she is indep with all ADLs/IADLs, drives, and works. She lives with her  in a home that has 2 GOPAL. At  time of entry, she is AOx4 and agreeable to participate in initial PT eval. Pt mod-indep with bed mobility, used bed rails for supine-to-sit tf. She is SBA for all tfs. BLE ROM and strength WFL for ADLs and amb. C/o n/t in bilat feet and R hip due to neruropathy. Amb ~60 ft with L medical shoe, SBA. Decreased gait speed, wide RODERICK, excessive L ER, decrease LUE arm swing. Pt is at baseline functional mobility. PT services not necessary at this time. Recommending d/c home when medically appropriate.  -SS (r) AN (t) SS (c)       Row Name 10/27/24 1412          Therapy Assessment/Plan (PT)    Criteria for Skilled Interventions Met (PT) no  -SS (r) AN (t) SS (c)     Therapy Frequency (PT) evaluation only  -SS (r) AN (t) SS (c)               User Key  (r) = Recorded By, (t) = Taken By, (c) = Cosigned By      Initials Name Provider Type    SS Laverne Orourke, PT Physical Therapist    Yoli Pina, PT Student PT Student                   Outcome Measures       Row Name 10/27/24 0815          How much help from another person do you currently need...    Turning from your back to your side while in flat bed without using bedrails? 4  -AC     Moving from lying on back to sitting on the side of a flat bed without bedrails? 4  -AC     Moving to and from a bed to a chair (including a wheelchair)? 4  -AC     Standing up from a chair using your arms (e.g., wheelchair, bedside chair)? 4  -AC     Climbing 3-5 steps with a railing? 4  -AC     To walk in hospital room? 4  -AC     AM-PAC 6 Clicks Score (PT) 24  -AC               User Key  (r) = Recorded By, (t) = Taken By, (c) = Cosigned By      Initials Name Provider Type    Maame Maria LPN Licensed Nurse                                 Physical Therapy Education       Title: PT OT SLP Therapies (Done)       Topic: Physical Therapy (Done)       Point: Mobility training (Done)       Learning Progress Summary            Patient Acceptance, E, VU by MICHAEL at 10/27/2024 7901                                       User Key       Initials Effective Dates Name Provider Type Discipline    AN 06/27/24 -  Yoli Calderon, PT Student PT Student PT                  PT Recommendation and Plan     Outcome Evaluation: Marilou is a 53 y/o F with a  history of insulin-dependent diabetes who presents to [facility] for complaints of increasing redness and swelling to her left second toe. At baseline, she is indep with all ADLs/IADLs, drives, and works. She lives with her  in a home that has 2 GOPAL. At time of entry, she is AOx4 and agreeable to participate in initial PT eval. Pt mod-indep with bed mobility, used bed rails for supine-to-sit tf. She is SBA for all tfs. BLE ROM and strength WFL for ADLs and amb. C/o n/t in bilat feet and R hip due to neruropathy. Amb ~60 ft with L medical shoe, SBA. Decreased gait speed, wide RODERICK, excessive L ER, decrease LUE arm swing. Pt is at baseline functional mobility. PT services not necessary at this time. Recommending d/c home when medically appropriate.     Time Calculation:         PT Charges       Row Name 10/27/24 1555             Time Calculation    Start Time 1352  -SS (r) AN (t) SS (c)      Stop Time 1405  -SS (r) AN (t) SS (c)      Time Calculation (min) 13 min  -SS (r) AN (t)      PT Received On 10/27/24  -SS (r) AN (t) SS (c)         Time Calculation- PT    Total Timed Code Minutes- PT 0 minute(s)  -SS (r) AN (t) SS (c)                User Key  (r) = Recorded By, (t) = Taken By, (c) = Cosigned By      Initials Name Provider Type    SS Laverne Orourke, PT Physical Therapist    Yoli Pina, PT Student PT Student                  Therapy Charges for Today       Code Description Service Date Service Provider Modifiers Qty    [id number] HC PT EVAL MOD COMPLEXITY 3 10/27/2024 Yoli Calderon, PT Student GP 1            PT G-Codes  AM-PAC 6 Clicks Score (PT): 24  PT Discharge Summary  Anticipated Discharge Disposition (PT): home    Yoli Calderon PT  Student  10/27/2024

## 2024-10-27 NOTE — PLAN OF CARE
"Goal Outcome Evaluation: Patient has been unable to sleep and requested sleep medication after 2am. Communicated with patient that \"once it past 2 AM we can not request medication due to it only being a few hours left in the shift. However, I will pass the message to day shift to get you something for tomorrow night.\" Patient understood. Patient ambulates without assistant. Continuing on fluids NS at 75mlhr and antibiotics. Presenting with cellulitis to the fourth toe to the left foot. Site remains red, warm to the touch and some drainage present. Numbness and tingling to bilateral feet. Able to make needs known and care on going.                                            "

## 2024-10-28 LAB
ALBUMIN SERPL-MCNC: 3.3 G/DL (ref 3.5–5.2)
ALBUMIN/GLOB SERPL: 1.1 G/DL
ALP SERPL-CCNC: 192 U/L (ref 39–117)
ALT SERPL W P-5'-P-CCNC: 42 U/L (ref 1–33)
ANION GAP SERPL CALCULATED.3IONS-SCNC: 8.3 MMOL/L (ref 5–15)
AST SERPL-CCNC: 56 U/L (ref 1–32)
BASOPHILS # BLD AUTO: 0.03 10*3/MM3 (ref 0–0.2)
BASOPHILS NFR BLD AUTO: 0.6 % (ref 0–1.5)
BILIRUB SERPL-MCNC: 0.4 MG/DL (ref 0–1.2)
BUN SERPL-MCNC: 12 MG/DL (ref 6–20)
BUN/CREAT SERPL: 14.8 (ref 7–25)
CALCIUM SPEC-SCNC: 8.5 MG/DL (ref 8.6–10.5)
CHLORIDE SERPL-SCNC: 104 MMOL/L (ref 98–107)
CO2 SERPL-SCNC: 23.7 MMOL/L (ref 22–29)
CREAT SERPL-MCNC: 0.81 MG/DL (ref 0.57–1)
DEPRECATED RDW RBC AUTO: 50.7 FL (ref 37–54)
EGFRCR SERPLBLD CKD-EPI 2021: 86.4 ML/MIN/1.73
EOSINOPHIL # BLD AUTO: 0.17 10*3/MM3 (ref 0–0.4)
EOSINOPHIL NFR BLD AUTO: 3.1 % (ref 0.3–6.2)
ERYTHROCYTE [DISTWIDTH] IN BLOOD BY AUTOMATED COUNT: 15.1 % (ref 12.3–15.4)
GLOBULIN UR ELPH-MCNC: 3.1 GM/DL
GLUCOSE BLDC GLUCOMTR-MCNC: 262 MG/DL (ref 70–105)
GLUCOSE BLDC GLUCOMTR-MCNC: 285 MG/DL (ref 70–105)
GLUCOSE BLDC GLUCOMTR-MCNC: 332 MG/DL (ref 70–105)
GLUCOSE BLDC GLUCOMTR-MCNC: 392 MG/DL (ref 70–105)
GLUCOSE SERPL-MCNC: 253 MG/DL (ref 65–99)
HCT VFR BLD AUTO: 30.4 % (ref 34–46.6)
HGB BLD-MCNC: 9.5 G/DL (ref 12–15.9)
IMM GRANULOCYTES # BLD AUTO: 0.28 10*3/MM3 (ref 0–0.05)
IMM GRANULOCYTES NFR BLD AUTO: 5.2 % (ref 0–0.5)
LYMPHOCYTES # BLD AUTO: 1.86 10*3/MM3 (ref 0.7–3.1)
LYMPHOCYTES NFR BLD AUTO: 34.3 % (ref 19.6–45.3)
MAGNESIUM SERPL-MCNC: 2.5 MG/DL (ref 1.6–2.6)
MCH RBC QN AUTO: 28.5 PG (ref 26.6–33)
MCHC RBC AUTO-ENTMCNC: 31.3 G/DL (ref 31.5–35.7)
MCV RBC AUTO: 91.3 FL (ref 79–97)
MONOCYTES # BLD AUTO: 0.52 10*3/MM3 (ref 0.1–0.9)
MONOCYTES NFR BLD AUTO: 9.6 % (ref 5–12)
NEUTROPHILS NFR BLD AUTO: 2.56 10*3/MM3 (ref 1.7–7)
NEUTROPHILS NFR BLD AUTO: 47.2 % (ref 42.7–76)
NRBC BLD AUTO-RTO: 0 /100 WBC (ref 0–0.2)
PHOSPHATE SERPL-MCNC: 3 MG/DL (ref 2.5–4.5)
PLATELET # BLD AUTO: 381 10*3/MM3 (ref 140–450)
PMV BLD AUTO: 10.1 FL (ref 6–12)
POTASSIUM SERPL-SCNC: 5.2 MMOL/L (ref 3.5–5.2)
PROT SERPL-MCNC: 6.4 G/DL (ref 6–8.5)
RBC # BLD AUTO: 3.33 10*6/MM3 (ref 3.77–5.28)
SODIUM SERPL-SCNC: 136 MMOL/L (ref 136–145)
WBC NRBC COR # BLD AUTO: 5.42 10*3/MM3 (ref 3.4–10.8)

## 2024-10-28 PROCEDURE — 84100 ASSAY OF PHOSPHORUS: CPT | Performed by: HOSPITALIST

## 2024-10-28 PROCEDURE — 25010000002 HEPARIN (PORCINE) PER 1000 UNITS

## 2024-10-28 PROCEDURE — 82948 REAGENT STRIP/BLOOD GLUCOSE: CPT

## 2024-10-28 PROCEDURE — 63710000001 INSULIN LISPRO (HUMAN) PER 5 UNITS: Performed by: INTERNAL MEDICINE

## 2024-10-28 PROCEDURE — 63710000001 INSULIN LISPRO (HUMAN) PER 5 UNITS: Performed by: HOSPITALIST

## 2024-10-28 PROCEDURE — 25010000002 CEFAZOLIN PER 500 MG: Performed by: INTERNAL MEDICINE

## 2024-10-28 PROCEDURE — 63710000001 INSULIN GLARGINE PER 5 UNITS: Performed by: HOSPITALIST

## 2024-10-28 PROCEDURE — 25010000002 PIPERACILLIN SOD-TAZOBACTAM PER 1 G

## 2024-10-28 PROCEDURE — 83735 ASSAY OF MAGNESIUM: CPT | Performed by: HOSPITALIST

## 2024-10-28 PROCEDURE — 97166 OT EVAL MOD COMPLEX 45 MIN: CPT

## 2024-10-28 PROCEDURE — 80053 COMPREHEN METABOLIC PANEL: CPT | Performed by: HOSPITALIST

## 2024-10-28 PROCEDURE — 94664 DEMO&/EVAL PT USE INHALER: CPT

## 2024-10-28 PROCEDURE — 94799 UNLISTED PULMONARY SVC/PX: CPT

## 2024-10-28 PROCEDURE — 94760 N-INVAS EAR/PLS OXIMETRY 1: CPT

## 2024-10-28 PROCEDURE — 85025 COMPLETE CBC W/AUTO DIFF WBC: CPT | Performed by: HOSPITALIST

## 2024-10-28 PROCEDURE — 63710000001 INSULIN GLARGINE PER 5 UNITS: Performed by: INTERNAL MEDICINE

## 2024-10-28 RX ORDER — INSULIN LISPRO 100 [IU]/ML
15 INJECTION, SOLUTION INTRAVENOUS; SUBCUTANEOUS
Status: DISCONTINUED | OUTPATIENT
Start: 2024-10-28 | End: 2024-11-01 | Stop reason: HOSPADM

## 2024-10-28 RX ADMIN — SODIUM CHLORIDE 2000 MG: 900 INJECTION INTRAVENOUS at 21:19

## 2024-10-28 RX ADMIN — GABAPENTIN 300 MG: 300 CAPSULE ORAL at 21:19

## 2024-10-28 RX ADMIN — INSULIN LISPRO 7 UNITS: 100 INJECTION, SOLUTION INTRAVENOUS; SUBCUTANEOUS at 10:08

## 2024-10-28 RX ADMIN — BUDESONIDE AND FORMOTEROL FUMARATE DIHYDRATE 2 PUFF: 160; 4.5 AEROSOL RESPIRATORY (INHALATION) at 08:35

## 2024-10-28 RX ADMIN — PANTOPRAZOLE SODIUM 40 MG: 40 TABLET, DELAYED RELEASE ORAL at 05:30

## 2024-10-28 RX ADMIN — INSULIN GLARGINE 40 UNITS: 100 INJECTION, SOLUTION SUBCUTANEOUS at 21:19

## 2024-10-28 RX ADMIN — LEVOTHYROXINE SODIUM 200 MCG: 0.1 TABLET ORAL at 05:30

## 2024-10-28 RX ADMIN — GABAPENTIN 300 MG: 300 CAPSULE ORAL at 05:30

## 2024-10-28 RX ADMIN — INSULIN LISPRO 15 UNITS: 100 INJECTION, SOLUTION INTRAVENOUS; SUBCUTANEOUS at 12:00

## 2024-10-28 RX ADMIN — ZIPRASIDONE HYDROCHLORIDE 60 MG: 20 CAPSULE ORAL at 21:19

## 2024-10-28 RX ADMIN — PIPERACILLIN AND TAZOBACTAM 3.38 G: 3; .375 INJECTION, POWDER, FOR SOLUTION INTRAVENOUS at 14:05

## 2024-10-28 RX ADMIN — INSULIN GLARGINE 30 UNITS: 100 INJECTION, SOLUTION SUBCUTANEOUS at 10:08

## 2024-10-28 RX ADMIN — BUDESONIDE AND FORMOTEROL FUMARATE DIHYDRATE 2 PUFF: 160; 4.5 AEROSOL RESPIRATORY (INHALATION) at 19:30

## 2024-10-28 RX ADMIN — HEPARIN SODIUM 5000 UNITS: 5000 INJECTION INTRAVENOUS; SUBCUTANEOUS at 05:30

## 2024-10-28 RX ADMIN — Medication 10 ML: at 21:21

## 2024-10-28 RX ADMIN — GABAPENTIN 300 MG: 300 CAPSULE ORAL at 14:06

## 2024-10-28 RX ADMIN — BUPROPION HYDROCHLORIDE 300 MG: 150 TABLET, EXTENDED RELEASE ORAL at 10:08

## 2024-10-28 RX ADMIN — INSULIN LISPRO 15 UNITS: 100 INJECTION, SOLUTION INTRAVENOUS; SUBCUTANEOUS at 17:57

## 2024-10-28 RX ADMIN — INSULIN LISPRO 8 UNITS: 100 INJECTION, SOLUTION INTRAVENOUS; SUBCUTANEOUS at 17:58

## 2024-10-28 RX ADMIN — DIVALPROEX SODIUM 1500 MG: 500 TABLET, EXTENDED RELEASE ORAL at 21:19

## 2024-10-28 RX ADMIN — LISINOPRIL 20 MG: 20 TABLET ORAL at 10:08

## 2024-10-28 RX ADMIN — PIPERACILLIN AND TAZOBACTAM 3.38 G: 3; .375 INJECTION, POWDER, FOR SOLUTION INTRAVENOUS at 05:30

## 2024-10-28 RX ADMIN — INSULIN LISPRO 12 UNITS: 100 INJECTION, SOLUTION INTRAVENOUS; SUBCUTANEOUS at 12:00

## 2024-10-28 RX ADMIN — INSULIN LISPRO 10 UNITS: 100 INJECTION, SOLUTION INTRAVENOUS; SUBCUTANEOUS at 21:19

## 2024-10-28 RX ADMIN — Medication 10 ML: at 09:00

## 2024-10-28 NOTE — PLAN OF CARE
Goal Outcome Evaluation: Pt remains on IV abx. Consult for podiatry to be call in am. Pt up amb in rool. CPAP in place. Pt refused tele but educated on the importance but refused. Pt denies any pain. Plan of care ongoing

## 2024-10-28 NOTE — PLAN OF CARE
Goal Outcome Evaluation:   Pt alert and orient x 4. Pt makes needs known verbally. Pt informed nurse that pt takes more insulin that prescribed at this time. Nurse passes along to pharmacy. Pharmacy received new orders and placed in MAR. Pt complaint with plan of care.

## 2024-10-28 NOTE — PAYOR COMM NOTE
"PA FORM WITH CLINICALS FOR INPATIENT PRECERT:                  AUTHORIZATION PENDING:   PLEASE CALL OR FAX DETERMINATION TO CONTACT BELOW. THANK YOU.        Jackeline Ayala RN MSN  /UR  [facility]  [phone] office  [phone] fax  [email or web address]    [facility]  NPI: [id number]  Tax: [id number]          Marilou Abbott (54 y.o. Female)       Date of Birth   1970    Social Security Number       Address   [address] [place] [address] [place] [facility]    Home Phone   [phone]    MRN   [id number]       Evangelical   None    Marital Status                               Admission Date   10/26/24    Admission Type   Emergency    Admitting Provider   Etienne Aguilar MD    Attending Provider   Parvez Millan MD    Department, Room/Bed   [facility] MEDICAL INPATIENT, 363/1       Discharge Date       Discharge Disposition       Discharge Destination                                 Attending Provider: Pavrez Millan MD    Allergies: Dust Mite Extract, Molds & Smuts, Bactrim [Sulfamethoxazole-trimethoprim], Trimethoprim    Isolation: None   Infection: None   Code Status: CPR    Ht: 172.7 cm (68\")   Wt: 135 kg (297 lb 3.2 oz)    Admission Cmt: None   Principal Problem: Cellulitis of left foot [L03.116]                   Active Insurance as of 10/26/2024       Primary Coverage       Payor Plan Insurance Group Employer/Plan Group    [facility]        Payor Plan Address Payor Plan Phone Number Payor Plan Fax Number Effective Dates    [address] [phone]  1/1/2024 - None Entered    [facility] [address]         Subscriber Name Subscriber Birth Date Member ID       ARIES ABBOTT P [birth year]/13/1967 [id number]                     Emergency Contacts        (Rel.) Home Phone Work Phone Mobile Phone    ARIES ABBOTT (Spouse) [phone] -- --          10/26/24 1825  Inpatient Admission  Once     Completed     Level of Care: " Med/Surg  Diagnosis: Cellulitis of left foot [212828]  Admitting Physician: CRISTHIAN RAMOS [[id number]]  Certification: I Certify That Inpatient Hospital Services Are Medically Necessary For Greater Than 2 Midnights             History & Physical        Cristhian Ramos MD at 10/26/24 1932              [facility] Medicine Services  History & Physical    Patient Name: Marilou Abbott  : 1970  MRN: [id number]  Primary Care Physician:  Kishore Penn MD  Date of admission: 10/26/2024  Date and Time of Service: 10/26/2024 at 1900    Subjective      Chief Complaint: Left second toe cellulitis    History of Present Illness: Marilou Abbott is a 54 y.o. female with a CMH of asthma, type 2 diabetes, thyroid disease, hypertension, depression, anxiety, diabetic peripheral neuropathy complicated by amputation of left great toe who presented to [facility] on 10/26/2024 with left second toe cellulitis.  She has had recurrent nonhealing wound to left second toe since  that mildly improving get worse..  At the wound center and her podiatrist in the past week prior to presentation she URI symptoms with fever and that improved with amoxicillin from urgent care.  She reports that due to generalized fatigue she fell 3 times within the week which further open to wound, on day of presentation she noticed blood on stockings from the wound with worsening redness prompting her to report to the emergency department.  She denies loss of consciousness, head strike, nausea, vomiting, diarrhea sick contacts or recent travels      Pertinent ED findings: Afebrile, CRP 1.63, ESR 24, no leukocytosis, creatinine 1.14, AST 87, ALT 49 unremarkable CBC.  X-ray of the left foot shows soft tissue swelling of the second and third digits however no convincing radiographic evidence of osteomyelitis.  She received vancomycin and Rocephin in the ED      Review of Systems   Constitutional:  Positive for fatigue and fever.  Negative for activity change, appetite change and chills.   HENT:  Negative for congestion, drooling, hearing loss, nosebleeds, sinus pressure, sinus pain, sneezing, sore throat, tinnitus and trouble swallowing.    Respiratory:  Negative for chest tightness, shortness of breath and wheezing.    Cardiovascular:  Negative for chest pain, palpitations and leg swelling.   Gastrointestinal:  Negative for abdominal distention, abdominal pain, constipation, diarrhea and nausea.   Genitourinary:  Negative for difficulty urinating, dysuria and flank pain.   Musculoskeletal:  Positive for joint swelling (Left second toe). Negative for arthralgias, back pain, gait problem, myalgias, neck pain and neck stiffness.   Skin:  Positive for color change (Redness in the left second toe) and wound (Left second toe). Negative for pallor and rash.   Neurological:  Positive for weakness. Negative for dizziness, seizures, syncope, light-headedness, numbness and headaches.   Psychiatric/Behavioral:  Negative for agitation and behavioral problems.        Personal History     Past Medical History:   Diagnosis Date    Anxiety     Asthma     Bruise     On stomach for 3 months    Depression     Diabetes mellitus     Peripheral neuropathy     Sleep apnea     Thyroid disease     Type 2 diabetes mellitus        Past Surgical History:   Procedure Laterality Date    AMPUTATION      CHOLECYSTECTOMY      GASTRIC BYPASS      LEG SURGERY      THYROIDECTOMY         Family History: family history includes Cancer in her father and mother; Diabetes in her father; Heart disease in her paternal grandfather. Otherwise pertinent FHx was reviewed and not pertinent to current issue.    Social History:  reports that she has never smoked. She has never used smokeless tobacco. She reports that she does not drink alcohol and does not use drugs.    Home Medications:  Prior to Admission Medications       Prescriptions Last Dose Informant Patient Reported? Taking?     albuterol sulfate  (90 Base) MCG/ACT inhaler   No No    Inhale 2 puffs Every 4 (Four) Hours As Needed for Wheezing or Shortness of Air.    amoxicillin (AMOXIL) 875 MG tablet   No No    Take 1 tablet by mouth 2 (Two) Times a Day for 10 days.    budesonide-formoterol (Symbicort) 80-4.5 MCG/ACT inhaler   No No    Inhale 2 puffs 2 (Two) Times a Day.    buPROPion XL (WELLBUTRIN XL) 300 MG 24 hr tablet   Yes No    dicyclomine (Bentyl) 10 MG capsule   Yes No    10 mg.    divalproex (DEPAKOTE ER) 500 MG 24 hr tablet   Yes No    1,500 mg.    gabapentin (NEURONTIN) 300 MG capsule   No No    Take 1 capsule by mouth 3 (Three) Times a Day.    gabapentin (NEURONTIN) 300 MG capsule   Yes No    300 mg.    glucagon (GLUCAGEN HYPOKIT) 1 MG injection   Yes No    GLUCAGEN HYPOKIT 1 MG SOLR    insulin aspart (NovoLOG) 100 UNIT/ML patient supplied pump   Yes No    Inject  under the skin into the appropriate area as directed Continuous. Use in insulin pump    insulin regular (NovoLIN R) 100 UNIT/ML injection   Yes No    See Instructions, PER SLIDING SCALE PROVIDED INJECT SUBCUTANEOUSLY THREE TIMES DAILY BEFORE MEALS. DO NOT EXCEED 150 UNITS PER 24 HOURS, # 120 mL, 5 Refill(s)DEVON, Pharmacy: [id number], Dx: e11.65, PER SLIDING SCALE PROVIDED INJECT SUBCUTANEOUSLY THREE TIMES DAILY BEFORE MEALS. DO NOT EXCEED 150 UNITS PER 24 HOURS, 177, cm, 05/17/24 8:34:00 EDT, Height, 139, kg, 05/17/24 11:40:00 EDT, Weight Dosing    Lantus 100 UNIT/ML injection   No No    INJECT 65 UNITS UNDER THE SKIN AS DIRECTED IN CASE OF PUMP FAILURE    levothyroxine (SYNTHROID, LEVOTHROID) 200 MCG tablet   Yes No    Take 200 mcg by mouth Daily.    lisinopril (PRINIVIL,ZESTRIL) 20 MG tablet   Yes No    20 mg.    omeprazole (priLOSEC) 40 MG capsule   Yes No    Take 1 capsule by mouth 2 (Two) Times a Day.    Ozempic, 0.25 or 0.5 MG/DOSE, 2 MG/3ML solution pen-injector   Yes No    Inject 0.25 mg under the skin into the appropriate area as directed.     ziprasidone (GEODON) 60 MG capsule   Yes No    1 capsule.              Allergies:  Allergies   Allergen Reactions    Dust Mite Extract Unknown - Low Severity    Molds & Smuts Unknown - Low Severity    Bactrim [Sulfamethoxazole-Trimethoprim] Rash     blisters    Trimethoprim Rash       Objective      Vitals:   Temp:  [98.1 °F (36.7 °C)] 98.1 °F (36.7 °C)  Heart Rate:  [86-94] 88  Resp:  [20] 20  BP: (120-136)/(59-80) 134/70  Body mass index is 39.53 kg/m².  Physical Exam  Constitutional:       Appearance: Normal appearance.   Cardiovascular:      Rate and Rhythm: Normal rate and regular rhythm.      Pulses: Normal pulses.      Heart sounds: Normal heart sounds. No murmur heard.  Pulmonary:      Effort: Pulmonary effort is normal. No respiratory distress.      Breath sounds: Normal breath sounds. No wheezing or rales.   Abdominal:      General: Bowel sounds are normal. There is no distension.      Tenderness: There is no abdominal tenderness.   Musculoskeletal:         General: No swelling, tenderness, deformity or signs of injury.      Cervical back: Normal range of motion.      Comments: Amputation of left great toe   Skin:     Findings: Erythema (Left second toe with wound in the dorsal region) present.   Neurological:      General: No focal deficit present.      Mental Status: She is alert and oriented to person, place, and time. Mental status is at baseline.      Cranial Nerves: No cranial nerve deficit.      Sensory: No sensory deficit.      Motor: No weakness.      Coordination: Coordination normal.         Diagnostic Data:  Lab Results (last 24 hours)       Procedure Component Value Units Date/Time    TSH Rfx On Abnormal To Free T4 [[id number]]  (Normal) Collected: 10/26/24 1609    Specimen: Blood from Arm, Left Updated: 10/26/24 1902     TSH 1.610 uIU/mL     Respiratory Panel PCR w/COVID-19(SARS-CoV-2) SHAYNE/JAMESON/ANDREW/PAD/COR/DARRYL In-House, NP Swab in UTM/VTM, 2 HR TAT - Swab, Nasopharynx [[id number]]  (Normal)  Collected: 10/26/24 1807    Specimen: Swab from Nasopharynx Updated: 10/26/24 1859     ADENOVIRUS, PCR Not Detected     Coronavirus 229E Not Detected     Coronavirus HKU1 Not Detected     Coronavirus NL63 Not Detected     Coronavirus OC43 Not Detected     COVID19 Not Detected     Human Metapneumovirus Not Detected     Human Rhinovirus/Enterovirus Not Detected     Influenza A PCR Not Detected     Influenza B PCR Not Detected     Parainfluenza Virus 1 Not Detected     Parainfluenza Virus 2 Not Detected     Parainfluenza Virus 3 Not Detected     Parainfluenza Virus 4 Not Detected     RSV, PCR Not Detected     Bordetella pertussis pcr Not Detected     Bordetella parapertussis PCR Not Detected     Chlamydophila pneumoniae PCR Not Detected     Mycoplasma pneumo by PCR Not Detected    Narrative:      In the setting of a positive respiratory panel with a viral infection PLUS a negative procalcitonin without other underlying concern for bacterial infection, consider observing off antibiotics or discontinuation of antibiotics and continue supportive care. If the respiratory panel is positive for atypical bacterial infection (Bordetella pertussis, Chlamydophila pneumoniae, or Mycoplasma pneumoniae), consider antibiotic de-escalation to target atypical bacterial infection.    POC Glucose Once [[id number]]  (Abnormal) Collected: 10/26/24 1810    Specimen: Blood Updated: 10/26/24 1812     Glucose 135 mg/dL      Comment: Serial Number: [id number]:  [id number]       Blood Culture - Blood, Arm, Right [[id number]] Collected: 10/26/24 1652    Specimen: Blood from Arm, Right Updated: 10/26/24 1654    Comprehensive Metabolic Panel [[id number]]  (Abnormal) Collected: 10/26/24 1609    Specimen: Blood from Arm, Left Updated: 10/26/24 1644     Glucose 66 mg/dL      BUN 20 mg/dL      Creatinine 1.14 mg/dL      Sodium 135 mmol/L      Potassium 4.5 mmol/L      Chloride 98 mmol/L      CO2 25.0 mmol/L      Calcium 8.6 mg/dL      Total Protein  7.2 g/dL      Albumin 3.6 g/dL      ALT (SGPT) 49 U/L      AST (SGOT) 87 U/L      Alkaline Phosphatase 179 U/L      Total Bilirubin 0.6 mg/dL      Globulin 3.6 gm/dL      A/G Ratio 1.0 g/dL      BUN/Creatinine Ratio 17.5     Anion Gap 12.0 mmol/L      eGFR 57.3 mL/min/1.73     Narrative:      GFR Normal >60  Chronic Kidney Disease <60  Kidney Failure <15      C-reactive Protein [[id number]]  (Abnormal) Collected: 10/26/24 1609    Specimen: Blood from Arm, Left Updated: 10/26/24 1642     C-Reactive Protein 6.33 mg/dL     Protime-INR [[id number]]  (Normal) Collected: 10/26/24 1609    Specimen: Blood from Arm, Left Updated: 10/26/24 1630     Protime 10.3 Seconds      INR 0.94    aPTT [[id number]]  (Normal) Collected: 10/26/24 1609    Specimen: Blood from Arm, Left Updated: 10/26/24 1630     PTT 27.6 seconds     Sedimentation Rate [[id number]]  (Normal) Collected: 10/26/24 1609    Specimen: Blood from Arm, Left Updated: 10/26/24 1624     Sed Rate 24 mm/hr     CBC & Differential [[id number]]  (Abnormal) Collected: 10/26/24 1609    Specimen: Blood from Arm, Left Updated: 10/26/24 1618    Narrative:      The following orders were created for panel order CBC & Differential.  Procedure                               Abnormality         Status                     ---------                               -----------         ------                     CBC Auto Differential[[id number]]        Abnormal            Final result                 Please view results for these tests on the individual orders.    CBC Auto Differential [[id number]]  (Abnormal) Collected: 10/26/24 1609    Specimen: Blood from Arm, Left Updated: 10/26/24 1618     WBC 5.36 10*3/mm3      RBC 3.86 10*6/mm3      Hemoglobin 11.0 g/dL      Hematocrit 34.6 %      MCV 89.6 fL      MCH 28.5 pg      MCHC 31.8 g/dL      RDW 14.9 %      RDW-SD 49.1 fl      MPV 10.6 fL      Platelets 346 10*3/mm3      Neutrophil % 59.6 %      Lymphocyte % 24.4 %      Monocyte % 10.8 %       Eosinophil % 1.3 %      Basophil % 1.1 %      Immature Grans % 2.8 %      Neutrophils, Absolute 3.19 10*3/mm3      Lymphocytes, Absolute 1.31 10*3/mm3      Monocytes, Absolute 0.58 10*3/mm3      Eosinophils, Absolute 0.07 10*3/mm3      Basophils, Absolute 0.06 10*3/mm3      Immature Grans, Absolute 0.15 10*3/mm3      nRBC 0.0 /100 WBC     POC Lactate [[id number]]  (Normal) Collected: 10/26/24 1615    Specimen: Blood Updated: 10/26/24 1616     Lactate 1.6 mmol/L      Comment: Serial Number: [id number]:  [id number]       Blood Culture - Blood, Arm, Left [[id number]] Collected: 10/26/24 1609    Specimen: Blood from Arm, Left Updated: 10/26/24 1615             Imaging Results (Last 24 Hours)       Procedure Component Value Units Date/Time    XR Foot 3+ View Left [[id number]] Collected: 10/26/24 1554     Updated: 10/26/24 1558    Narrative:      XR FOOT 3+ VW LEFT    Date of Exam: 10/26/2024 3:43 PM EDT    Indication: redness, swelling, open draining wound 2nd toe    Comparison: None available.    Findings:  There is diffuse soft tissue swelling. Patient is status post amputation of the first digit. There is marked soft tissue swelling of the second and third digits but no definite gas seen within the soft tissues. No bony destruction or abnormal periosteal   reaction identified. No radiopaque foreign bodies are seen. There is degenerative spurring along the dorsal aspect of the tarsal bones. Tarsal bones are otherwise unremarkable.      Impression:      Impression:    1. Amputation of the first digit.  2. Soft tissue swelling of the foot and marked soft tissue swelling of the second and third digits. No radiopaque foreign body or gas within the soft tissues.  3. No conventional radiographic evidence of osteomyelitis.      Electronically Signed: Fredy Stinson MD    10/26/2024 3:56 PM EDT    Workstation ID: [id number]              Assessment & Plan        This is a 54 y.o. female with:    Active and Resolved  Problems  Active Hospital Problems    Diagnosis  POA    **Cellulitis of left foot [L03.116]  Yes      Resolved Hospital Problems   No resolved problems to display.       Assessment and plan    Cellulitis of left second toe  -Continue on vancomycin and Zosyn for pseudomonal coverage  -Soft tissue swelling however no radiographic evidence of osteomyelitis.  Defer to podiatry for further evaluation of osteomyelitis  -Podiatry consult  -Tylenol 1 g every 6 hours for pain and Dilaudid 0.25 mg every 6 hours for breakthrough pain  -Home dose of gabapentin  -PT OT eval    ALYSE likely prerenal due to dehydration  -NS at 75 cc/h  -Check BMP in the a.m.    History of diabetes type 2  -Hold home dose of antidiabetic regimen, start on Lantus 10 units with insulin sliding scale    History of hypertension  -Hold home dose of lisinopril 20 mg daily and reevaluate in the a.m. due to ALYSE    History of hypothyroidism  -Continue on levothyroxine 200 mcg daily    History of mood disorder  -Resume Wellbutrin 300 mg daily, Depakote 500 mg daily.  -Resume other home meds after pharmacy verification    History of asthma  Resume home bronchodilators        VTE Prophylaxis:  Pharmacologic VTE prophylaxis orders are present.        The patient desires to be as follows:    CODE STATUS:    Code Status (Patient has no pulse and is not breathing): CPR (Attempt to Resuscitate)  Medical Interventions (Patient has pulse or is breathing): Full Support        Tomas Abbott, who can be contacted at [phone], is the designated person to make medical decisions on the patient's behalf if She is incapable of doing so. This was clarified with patient and/or next of kin on 10/26/2024 during the course of this H&P.    Admission Status:  I believe this patient meets inpatient status.    Expected Length of Stay: greater than 2 nights    PDMP and Medication Dispenses via Sidebar reviewed and consistent with patient reported medications.    I discussed the  patient's findings and my recommendations with patient.      Signature:     This document has been electronically signed by Etienne Aguilar MD on October 26, 2024 19:32 EDT   [facility] Hospitalist Team    Electronically signed by Etienne Aguilar MD at 10/26/24 2000          Emergency Department Notes        Dung Otto, RN at 10/26/24 2036          Nursing report ED to floor  Marilou M Abbott  54 y.o.  female    HPI:   Chief Complaint   Patient presents with    Wound Infection     Left foot 2nd toe wound.  Pt states has been there since June is going to wound clinic, looks worse today       Admitting doctor:   Etienne Aguilar MD    Admitting diagnosis:   The primary encounter diagnosis was Cellulitis of left foot. 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    Code status:   Current Code Status       Date Active Code Status Order ID Comments User Context       10/26/2024 1826 CPR (Attempt to Resuscitate) [id number]  Etienne Aguilar MD ED        Question Answer    Code Status (Patient has no pulse and is not breathing) CPR (Attempt to Resuscitate)    Medical Interventions (Patient has pulse or is breathing) Full Support                    Allergies:   Dust mite extract, Molds & smuts, Bactrim [sulfamethoxazole-trimethoprim], and Trimethoprim    Isolation:  No active isolations     Fall Risk:  Fall Risk Assessment was completed, and patient is at low risk for falls.   Predictive Model Details         12 (Low) Factor Value    Calculated 10/26/2024 20:36 Age 54    Risk of Fall Model Active Peripheral IV Present     Imaging order in this encounter Present     Diastolic BP 49     Respiratory Rate 18     Magnesium not on file     Number of Distinct Medication Classes administered 4     Calcium 8.6 mg/dL     Seun Scale not on file     Albumin 3.6 g/dL     Chloride 98  mmol/L     Creatinine 1.14 mg/dL     ALT 49 U/L     Total Bilirubin 0.6 mg/dL     Days after Admission 0.23     Potassium 4.5 mmol/L         Weight:       10/26/24  1505   Weight: 118 kg (260 lb)       Intake and Output    Intake/Output Summary (Last 24 hours) at 10/26/2024 2037  Last data filed at 10/26/2024 1736  Gross per 24 hour   Intake 100 ml   Output --   Net 100 ml       Diet:   Dietary Orders (From admission, onward)       Start     Ordered    10/26/24 1813  Diet: Diabetic; Consistent Carbohydrate; Fluid Consistency: Thin (IDDSI 0)  Diet Effective Now        References:    Diet Order Crosswalk   Question Answer Comment   Diets: Diabetic    Diabetic Diet: Consistent Carbohydrate    Fluid Consistency: Thin (IDDSI 0)        10/26/24 1812                     Most recent vitals:   Vitals:    10/26/24 1941 10/26/24 2001 10/26/24 2016 10/26/24 2031   BP: 154/61 116/51 118/54 126/49   BP Location: Right arm      Patient Position: Sitting      Pulse: 92 89 95 88   Resp: 18 18     Temp: 97.7 °F (36.5 °C)      TempSrc: Oral      SpO2: 100% 100% 99% 99%   Weight:       Height:           Active LDAs/IV Access:   Lines, Drains & Airways       Active LDAs       Name Placement date Placement time Site Days    Peripheral IV 10/26/24 1609 Left Antecubital 10/26/24  1609  Antecubital  less than 1                    Skin Condition:   Skin Assessments (last day)       None             Labs (abnormal labs have a star):   Labs Reviewed   COMPREHENSIVE METABOLIC PANEL - Abnormal; Notable for the following components:       Result Value    Creatinine 1.14 (*)     Sodium 135 (*)     ALT (SGPT) 49 (*)     AST (SGOT) 87 (*)     Alkaline Phosphatase 179 (*)     eGFR 57.3 (*)     All other components within normal limits    Narrative:     GFR Normal >60  Chronic Kidney Disease <60  Kidney Failure <15     C-REACTIVE PROTEIN - Abnormal; Notable for the following components:    C-Reactive Protein 6.33 (*)     All other components within  normal limits   CBC WITH AUTO DIFFERENTIAL - Abnormal; Notable for the following components:    Hemoglobin 11.0 (*)     Immature Grans % 2.8 (*)     Immature Grans, Absolute 0.15 (*)     All other components within normal limits   POCT GLUCOSE FINGERSTICK - Abnormal; Notable for the following components:    Glucose 135 (*)     All other components within normal limits   RESPIRATORY PANEL PCR W/ COVID-19 (SARS-COV-2), NP SWAB IN UTM/VTP, 2 HR TAT - Normal    Narrative:     In the setting of a positive respiratory panel with a viral infection PLUS a negative procalcitonin without other underlying concern for bacterial infection, consider observing off antibiotics or discontinuation of antibiotics and continue supportive care. If the respiratory panel is positive for atypical bacterial infection (Bordetella pertussis, Chlamydophila pneumoniae, or Mycoplasma pneumoniae), consider antibiotic de-escalation to target atypical bacterial infection.   PROTIME-INR - Normal   APTT - Normal   SEDIMENTATION RATE - Normal   TSH RFX ON ABNORMAL TO FREE T4 - Normal   CK - Normal   POC LACTATE - Normal   BLOOD CULTURE   BLOOD CULTURE   POCT GLUCOSE FINGERSTICK   POCT GLUCOSE FINGERSTICK   POCT GLUCOSE FINGERSTICK   POCT GLUCOSE FINGERSTICK   CBC AND DIFFERENTIAL    Narrative:     The following orders were created for panel order CBC & Differential.  Procedure                               Abnormality         Status                     ---------                               -----------         ------                     CBC Auto Differential[[id number]]        Abnormal            Final result                 Please view results for these tests on the individual orders.       LOC: Person, Place, Time, and Situation    Telemetry:  Med/Surg    Cardiac Monitoring Ordered: no    EKG:   No orders to display       Medications Given in the ED:   Medications   sodium chloride 0.9 % flush 10 mL (has no administration in time range)   sodium  chloride 0.9 % flush 10 mL (has no administration in time range)   sodium chloride 0.9 % flush 10 mL (has no administration in time range)   sodium chloride 0.9 % infusion 40 mL (has no administration in time range)   heparin (porcine) 5000 UNIT/ML injection 5,000 Units (has no administration in time range)   nitroglycerin (NITROSTAT) SL tablet 0.4 mg (has no administration in time range)   Potassium Replacement - Follow Nurse / BPA Driven Protocol (has no administration in time range)   Magnesium Standard Dose Replacement - Follow Nurse / BPA Driven Protocol (has no administration in time range)   Phosphorus Replacement - Follow Nurse / BPA Driven Protocol (has no administration in time range)   Calcium Replacement - Follow Nurse / BPA Driven Protocol (has no administration in time range)   sennosides-docusate (PERICOLACE) 8.6-50 MG per tablet 2 tablet (has no administration in time range)     And   polyethylene glycol (MIRALAX) packet 17 g (has no administration in time range)     And   bisacodyl (DULCOLAX) EC tablet 5 mg (has no administration in time range)     And   bisacodyl (DULCOLAX) suppository 10 mg (has no administration in time range)   Pharmacy to Dose Zosyn (has no administration in time range)   piperacillin-tazobactam (ZOSYN) 3.375 g IVPB in 100 mL NS MBP (CD) (has no administration in time range)   albuterol (PROVENTIL) nebulizer solution 0.083% 2.5 mg/3mL (has no administration in time range)   budesonide-formoterol (SYMBICORT) 160-4.5 MCG/ACT inhaler 2 puff (2 puffs Inhalation Given 10/26/24 2001)   buPROPion XL (WELLBUTRIN XL) 24 hr tablet 300 mg (has no administration in time range)   levothyroxine (SYNTHROID, LEVOTHROID) tablet 200 mcg (has no administration in time range)   lisinopril (PRINIVIL,ZESTRIL) tablet 20 mg (has no administration in time range)   pantoprazole (PROTONIX) EC tablet 40 mg (has no administration in time range)   gabapentin (NEURONTIN) capsule 300 mg (has no administration  in time range)   divalproex (DEPAKOTE ER) 24 hr tablet 1,500 mg (has no administration in time range)   acetaminophen (TYLENOL) tablet 1,000 mg (has no administration in time range)     Or   acetaminophen (TYLENOL) 160 MG/5ML oral solution 650 mg (has no administration in time range)     Or   acetaminophen (TYLENOL) suppository 650 mg (has no administration in time range)   dextrose (GLUTOSE) oral gel 15 g (has no administration in time range)   dextrose (D50W) (25 g/50 mL) IV injection 25 g (has no administration in time range)   glucagon (GLUCAGEN) injection 1 mg (has no administration in time range)   insulin glargine (LANTUS, SEMGLEE) injection 10 Units (has no administration in time range)   insulin lispro (HUMALOG/ADMELOG) injection 2-9 Units (has no administration in time range)   sodium chloride 0.9 % infusion (has no administration in time range)   HYDROmorphone (DILAUDID) injection 0.25 mg (has no administration in time range)   cefTRIAXone (ROCEPHIN) 2,000 mg in sodium chloride 0.9 % 100 mL MBP (0 mg Intravenous Stopped 10/26/24 1736)   vancomycin IVPB 2000 mg in 0.9% Sodium Chloride 500 mL (0 mg Intravenous Stopped 10/26/24 2033)   ipratropium-albuterol (DUO-NEB) nebulizer solution 3 mL (3 mL Nebulization Given 10/26/24 1708)       Imaging results:  XR Foot 3+ View Left    Result Date: 10/26/2024  Impression: 1. Amputation of the first digit. 2. Soft tissue swelling of the foot and marked soft tissue swelling of the second and third digits. No radiopaque foreign body or gas within the soft tissues. 3. No conventional radiographic evidence of osteomyelitis. Electronically Signed: Fredy Stinson MD  10/26/2024 3:56 PM EDT  Workstation ID: [id number]     Social issues:   Social History     Socioeconomic History    Marital status:    Tobacco Use    Smoking status: Never    Smokeless tobacco: Never   Vaping Use    Vaping status: Never Used   Substance and Sexual Activity    Alcohol use: No    Drug use:  No    Sexual activity: Defer       NIH Stroke Scale:  Interval: (not recorded)  1a. Level of Consciousness: (not recorded)  1b. LOC Questions: (not recorded)  1c. LOC Commands: (not recorded)  2. Best Gaze: (not recorded)  3. Visual: (not recorded)  4. Facial Palsy: (not recorded)  5a. Motor Arm, Left: (not recorded)  5b. Motor Arm, Right: (not recorded)  6a. Motor Leg, Left: (not recorded)  6b. Motor Leg, Right: (not recorded)  7. Limb Ataxia: (not recorded)  8. Sensory: (not recorded)  9. Best Language: (not recorded)  10. Dysarthria: (not recorded)  11. Extinction and Inattention (formerly Neglect): (not recorded)    Total (NIH Stroke Scale): (not recorded)     Additional notable assessment information:     Nursing report ED to floor:  Vivian Otto RN   10/26/24 20:37 EDT     Electronically signed by Dung Otto RN at 10/26/24 2037       Chitra Liao APRN at 10/26/24 1627          Subjective   History of Present Illness  Patient is a 54-year-old obese white female with a history of insulin-dependent diabetes who presents today for complaints of increasing redness and swelling to her left second toe.  She states she has been treated for a wound on her toe since June of this year.  She has been followed by wound care center most recently.  She states it appeared that it was healing well however she was ill earlier this week with fever and cough and she felt generally weak and she sustained a fall at home.  Denies any significant injury from her fall but states she did have to crawl across the carpet to get to a place to get herself up.  She states she think she may have injured the toe as she did so.  She states today she noticed some bloody drainage through her sock, she took off her sock she noticed the redness and the swelling to the toe.  She also notes some streaking up the foot.      Review of Systems   Constitutional:  Positive for fever.   HENT:  Positive for congestion.    Respiratory:   Positive for cough.    Musculoskeletal:         Redness and swelling left second toe       Past Medical History:   Diagnosis Date    Anxiety     Asthma     Bruise     On stomach for 3 months    Depression     Diabetes mellitus     Peripheral neuropathy     Sleep apnea     Thyroid disease     Type 2 diabetes mellitus        Allergies   Allergen Reactions    Dust Mite Extract Unknown - Low Severity    Molds & Smuts Unknown - Low Severity    Bactrim [Sulfamethoxazole-Trimethoprim] Rash     blisters    Trimethoprim Rash       Past Surgical History:   Procedure Laterality Date    AMPUTATION      CHOLECYSTECTOMY      GASTRIC BYPASS      LEG SURGERY      THYROIDECTOMY         Family History   Problem Relation Age of Onset    Cancer Mother     Cancer Father     Diabetes Father     Heart disease Paternal Grandfather        Social History     Socioeconomic History    Marital status:    Tobacco Use    Smoking status: Never    Smokeless tobacco: Never   Vaping Use    Vaping status: Never Used   Substance and Sexual Activity    Alcohol use: No    Drug use: No    Sexual activity: Defer           Objective   Physical Exam  Vital signs and triage nurse note reviewed.  Constitutional: Awake, alert; well-developed and well-nourished. No acute distress is noted.  HEENT: Normocephalic, atraumatic; pupils are PERRL with intact EOM; oropharynx is pink and moist without exudate or erythema.  No drooling or pooling of oral secretions.  Neck: Supple, full range of motion without pain; no cervical lymphadenopathy. Normal phonation.  Cardiovascular: Regular rate and rhythm, normal S1-S2.  No murmur noted.  Pulmonary: Respiratory effort regular nonlabored, breath sounds clear to auscultation all fields.  Abdomen: Soft, nontender, nondistended with normoactive bowel sounds; no rebound or guarding.  Musculoskeletal: Independent range of motion of all extremities with no palpable tenderness or edema.   Skin: Flesh tone, warm, dry.  Diffuse  erythema and warmth noted to the entire left second toe with some streaking up through the foot.  There is an ulceration noted to the dorsal aspect of the second toe with no active bleeding or drainage.    Procedures          ED Course      Labs Reviewed   COMPREHENSIVE METABOLIC PANEL - Abnormal; Notable for the following components:       Result Value    Creatinine 1.14 (*)     Sodium 135 (*)     ALT (SGPT) 49 (*)     AST (SGOT) 87 (*)     Alkaline Phosphatase 179 (*)     eGFR 57.3 (*)     All other components within normal limits    Narrative:     GFR Normal >60  Chronic Kidney Disease <60  Kidney Failure <15     C-REACTIVE PROTEIN - Abnormal; Notable for the following components:    C-Reactive Protein 6.33 (*)     All other components within normal limits   CBC WITH AUTO DIFFERENTIAL - Abnormal; Notable for the following components:    Hemoglobin 11.0 (*)     Immature Grans % 2.8 (*)     Immature Grans, Absolute 0.15 (*)     All other components within normal limits   PROTIME-INR - Normal   APTT - Normal   SEDIMENTATION RATE - Normal   POC LACTATE - Normal   BLOOD CULTURE   BLOOD CULTURE   RESPIRATORY PANEL PCR W/ COVID-19 (SARS-COV-2), NP SWAB IN UTM/VTP, 2 HR TAT   CBC AND DIFFERENTIAL    Narrative:     The following orders were created for panel order CBC & Differential.  Procedure                               Abnormality         Status                     ---------                               -----------         ------                     CBC Auto Differential[[id number]]        Abnormal            Final result                 Please view results for these tests on the individual orders.     XR Foot 3+ View Left    Result Date: 10/26/2024  Impression: 1. Amputation of the first digit. 2. Soft tissue swelling of the foot and marked soft tissue swelling of the second and third digits. No radiopaque foreign body or gas within the soft tissues. 3. No conventional radiographic evidence of osteomyelitis.  Electronically Signed: Fredy Stinson MD  10/26/2024 3:56 PM EDT  Workstation ID: [id number]   Medications   sodium chloride 0.9 % flush 10 mL (has no administration in time range)   cefTRIAXone (ROCEPHIN) 2,000 mg in sodium chloride 0.9 % 100 mL MBP (2,000 mg Intravenous New Bag 10/26/24 5942)   vancomycin IVPB 2000 mg in 0.9% Sodium Chloride 500 mL (has no administration in time range)   ipratropium-albuterol (DUO-NEB) nebulizer solution 3 mL (3 mL Nebulization Given 10/26/24 0515)                                              Medical Decision Making  Patient presents today with the above complaint.    She had the above exam and evaluation.  IV was established.  Labs and x-ray were obtained.  She was started on IV antibiotics.  Blood cultures and POC lactate were obtained.    Workup: CBC is unremarkable with a normal white blood cell count of 5.3, hemoglobin appears stable at 11.0.  Sed rate within normal notes at 24.  POC lactate within normal limits at 1.6.  CRP 6.  X-ray of left foot was independently interpreted by Dr. Arce and reviewed by myself showed no definite osteomyelitis, radiologist reads amputation of the first digit.  Soft tissue swelling of the left foot and marked soft tissue swelling of the second and third digits.  No radiopaque foreign body or gas within the soft tissue's.    On reexamination patient resting quietly no distress.  She has no new complaints.  She is well-appearing and hemodynamically stable.  She is afebrile here.  Findings were discussed with her.  She will be admitted to the hospital for IV antibiotics and further management.  Case discussed with hospitalist.    Problems Addressed:  Cellulitis of left foot: complicated acute illness or injury  Diabetic ulcer of toe of left foot associated with diabetes mellitus of other type, unspecified ulcer stage: complicated acute illness or injury  History of diabetes mellitus: complicated acute illness or injury    Amount and/or Complexity  of Data Reviewed  Labs: ordered.  Radiology: ordered.    Risk  Prescription drug management.  Decision regarding hospitalization.        Final diagnoses:   Cellulitis of left foot   Diabetic ulcer of toe of left foot associated with diabetes mellitus of other type, unspecified ulcer stage   History of diabetes mellitus       ED Disposition  ED Disposition       ED Disposition   Decision to Admit    Condition   --    Comment   Level of Care: Med/Surg [1]   Admitting Physician: CRISTHIAN RAMOS [[id number]]   Attending Physician: CRISTHIAN RAMOS [[id number]]                 No follow-up provider specified.       Medication List      No changes were made to your prescriptions during this visit.            Chitra Liao APRN  10/26/24 170      Electronically signed by Chitra Liao APRN at 10/26/24 1709          Physician Progress Notes (all)        Parvez Millan MD at 10/28/24 1156              [facility] MEDICINE SERVICE  DAILY PROGRESS NOTE    NAME: Marilou Abbott  : 1970  MRN: [id number]      LOS: 2 days     PROVIDER OF SERVICE: Parvez Millan MD    Chief Complaint: Cellulitis of left foot    Subjective:     Interval History: Patient states her foot pain is tolerable.  Denies any fevers or chills overnight.        Review of Systems:   Review of Systems    Objective:     Vital Signs  Temp:  [97.4 °F (36.3 °C)-98 °F (36.7 °C)] 97.4 °F (36.3 °C)  Heart Rate:  [79-92] 79  Resp:  [16-18] 16  BP: (117-158)/(69-88) 128/69   Body mass index is 45.19 kg/m².    Physical Exam  Physical Exam  General Appearance:  Alert, cooperative, no distress, appears stated age  Head:  Normocephalic, without obvious abnormality, atraumatic  Eyes:  PERRL, conjunctiva/corneas clear, EOM's intact, fundi benign, both eyes  Ears:  Normal TM's and external ear canals, both ears  Nose: Nares normal, septum midline, mucosa normal, no drainage or sinus tenderness  Throat: Lips, mucosa, and tongue normal; teeth and gums  normal  Neck: Supple, symmetrical, trachea midline, no adenopathy, thyroid: not enlarged, symmetric, no tenderness/mass/nodules, no carotid bruit or JVD  Lungs:   Clear to auscultation bilaterally, respirations unlabored  Heart:  Regular rate and rhythm, S1, S2 normal, no murmur, rub or gallop  Abdomen:  Soft, non-tender, bowel sounds active all four quadrants,  no masses, no organomegaly  Extremities: Left foot erythema and edema surrounding second and third toe  Pulses: 2+ and symmetric  Skin: Skin color, texture, turgor normal, no rashes or lesions  Neurologic: Normal         Diagnostic Data    Results from last 7 days   Lab Units 10/28/24  0350   WBC 10*3/mm3 5.42   HEMOGLOBIN g/dL 9.5*   HEMATOCRIT % 30.4*   PLATELETS 10*3/mm3 381   GLUCOSE mg/dL 253*   CREATININE mg/dL 0.81   BUN mg/dL 12   SODIUM mmol/L 136   POTASSIUM mmol/L 5.2   AST (SGOT) U/L 56*   ALT (SGPT) U/L 42*   ALK PHOS U/L 192*   BILIRUBIN mg/dL 0.4   ANION GAP mmol/L 8.3       XR Foot 3+ View Left    Result Date: 10/26/2024  Impression: 1. Amputation of the first digit. 2. Soft tissue swelling of the foot and marked soft tissue swelling of the second and third digits. No radiopaque foreign body or gas within the soft tissues. 3. No conventional radiographic evidence of osteomyelitis. Electronically Signed: Fredy Stinson MD  10/26/2024 3:56 PM EDT  Workstation ID: [id number]       I reviewed the patient's new clinical results.    Assessment/Plan:     Active and Resolved Problems  Active Hospital Problems    Diagnosis  POA    **Cellulitis of left foot [L03.116]  Yes      Resolved Hospital Problems   No resolved problems to display.       Left foot cellulitis  -Patient still has some erythema and edema surrounding the left second and third toe consistent with cellulitis  -X-ray does not show any evidence of osteomyelitis however will obtain MRI to definitively confirm whether OM is present  -Continue IV antibiotics with Zosyn; vancomycin  discontinued given negative MRSA swab  -Podiatry consult pending    DM type II, uncontrolled with hyperglycemia  -Increase Lantus to 40 Monty twice daily and prandial insulin to 15 units 3 times daily with meals as well as hide sliding scale  -A1c of 8 indicates fairly decent glycemic control prior to admission    Hypertension  -Continue lisinopril    Hypothyroidism  -Continue Synthroid    Morbid Obesity  -Counseled patient on diet and exercise to improve weight loss and comorbid conditions          VTE Prophylaxis:  Pharmacologic VTE prophylaxis orders are present.             Disposition Planning:     Barriers to Discharge: Possible surgical intervention, IV antibiotics  Anticipated Date of Discharge: 10/31  Place of Discharge: Home      Time: 40 minutes     Code Status and Medical Interventions: CPR (Attempt to Resuscitate); Full Support   Ordered at: 10/26/24 1826     Code Status (Patient has no pulse and is not breathing):    CPR (Attempt to Resuscitate)     Medical Interventions (Patient has pulse or is breathing):    Full Support       Signature: Electronically signed by Parvez Millan MD, 10/28/24, 11:54 EDT.  [facility] Hospitalist Team    Electronically signed by Parvez Millan MD at 10/28/24 1157       Romel Hendricks MD at 10/27/24 1317              [facility] MEDICINE SERVICE  DAILY PROGRESS NOTE    NAME: Marilou VILCHIS [place]  : 1970  MRN: [id number]      LOS: 1 day     PROVIDER OF SERVICE: Romel Hendricks MD    Chief Complaint: Cellulitis of left foot    Subjective:     Interval History:  History taken from: patient chart RN  Afebrile pain controlled         Review of Systems:   Review of Systems  All negative except as above   Objective:     Vital Signs  Temp:  [97.7 °F (36.5 °C)-98.1 °F (36.7 °C)] 97.7 °F (36.5 °C)  Heart Rate:  [78-95] 88  Resp:  [16-20] 17  BP: (116-154)/(49-80) 146/77   Body mass index is 39.53 kg/m².    Physical Exam  Physical Exam  AOx3 NAD  RRR S1-S2  audible  Lungs with fair air entry  Abdomen soft nontender nondistended left foot second digit erythema swollen with erythema tender to touch     Diagnostic Data    Results from last 7 days   Lab Units 10/27/24  0953 10/27/24  0607   WBC 10*3/mm3  --  5.48   HEMOGLOBIN g/dL  --  9.7*   HEMATOCRIT %  --  30.3*   PLATELETS 10*3/mm3  --  352   GLUCOSE mg/dL 219*  --    CREATININE mg/dL 0.87  --    BUN mg/dL 15  --    SODIUM mmol/L 133*  --    POTASSIUM mmol/L 4.9  --    AST (SGOT) U/L 78*  --    ALT (SGPT) U/L 45*  --    ALK PHOS U/L 178*  --    BILIRUBIN mg/dL 0.6  --    ANION GAP mmol/L 9.4  --        XR Foot 3+ View Left    Result Date: 10/26/2024  Impression: 1. Amputation of the first digit. 2. Soft tissue swelling of the foot and marked soft tissue swelling of the second and third digits. No radiopaque foreign body or gas within the soft tissues. 3. No conventional radiographic evidence of osteomyelitis. Electronically Signed: Fredy Stinson MD  10/26/2024 3:56 PM EDT  Workstation ID: [id number]       I reviewed the patient's new clinical results.  I reviewed the patient's new imaging results and agree with the interpretation.    Assessment/Plan:     Active and Resolved Problems  Active Hospital Problems    Diagnosis  POA    **Cellulitis of left foot [L03.116]  Yes      Resolved Hospital Problems   No resolved problems to display.       #Left foot second digit wound  X-ray with no evidence of osteomyelitis  Podiatry consulted  Continue vancomycin and Zosyn for now  Continue current pain control  Defer need of MRI to podiatry    #Mild increase in creatinine  No evidence of ALYSE  BMP daily    #IDDM  Increase Lantus to 30 units twice daily  Lispro 7 units 3 times daily scheduled  ISS lispro moderate to high correctional  Diabetic diet    #Hypertension  Continue home dose of lisinopril    #Hypothyroidism  Continue home dose of levothyroxine    VTE Prophylaxis:  Pharmacologic VTE prophylaxis orders are present.              Disposition Planning:     Barriers to Discharge: Medical workup  Anticipated Date of Discharge: TBD  Place of Discharge: Home      Time: 45 minutes     Code Status and Medical Interventions: CPR (Attempt to Resuscitate); Full Support   Ordered at: 10/26/24 1826     Code Status (Patient has no pulse and is not breathing):    CPR (Attempt to Resuscitate)     Medical Interventions (Patient has pulse or is breathing):    Full Support       Signature: Electronically signed by Romel Hendricks MD, 10/27/24, 13:18 EDT.  [facility] [place]      Electronically signed by Romel Hendricks MD at 10/27/24 1325       Consult Notes (all)    No notes of this type exist for this encounter.

## 2024-10-28 NOTE — CASE MANAGEMENT/SOCIAL WORK
Discharge Planning Assessment   [place]     Patient Name: Marilou Abbott  MRN: [id number]  Today's Date: 10/28/2024    Admit Date: 10/26/2024    Plan: Routine home with spouse. Watch for IV abx.   Discharge Needs Assessment       Row Name 10/28/24 1603       Living Environment    People in Home spouse    Name(s) of People in Home -Tomas    Current Living Arrangements home    Potentially Unsafe Housing Conditions none    In the past 12 months has the electric, gas, oil, or water company threatened to shut off services in your home? No    Primary Care Provided by self    Provides Primary Care For no one    Family Caregiver if Needed spouse    Family Caregiver Names Tomas    Quality of Family Relationships helpful;involved;supportive    Able to Return to Prior Arrangements yes       Resource/Environmental Concerns    Resource/Environmental Concerns none    Transportation Concerns none       Transportation Needs    In the past 12 months, has lack of transportation kept you from medical appointments or from getting medications? no    In the past 12 months, has lack of transportation kept you from meetings, work, or from getting things needed for daily living? No       Food Insecurity    Within the past 12 months, you worried that your food would run out before you got the money to buy more. Never true    Within the past 12 months, the food you bought just didn't last and you didn't have money to get more. Never true       Transition Planning    Patient/Family Anticipates Transition to home;home with family    Patient/Family Anticipated Services at Transition none    Transportation Anticipated family or friend will provide;car, drives self       Discharge Needs Assessment    Readmission Within the Last 30 Days no previous admission in last 30 days    Equipment Currently Used at Home glucometer;cpap    Concerns to be Addressed denies needs/concerns at this time;no discharge needs identified    Do you want help finding  or keeping work or a job? Patient declined    Do you want help with school or training? For example, starting or completing job training or getting a high school diploma, GED or equivalent No    Anticipated Changes Related to Illness none    Equipment Needed After Discharge none    Provided Post Acute Provider List? N/A    Provided Post Acute Provider Quality & Resource List? N/A                   Discharge Plan       Row Name 10/28/24 1604       Plan    Plan Routine home with spouse. Watch for IV abx.    Patient/Family in Agreement with Plan yes    Plan Comments CM met with patient and spouse Tomas at bedside. Pt lives at home, drives, and is independent with ADL's. PCP and pharmacy confirmed. Only reported DME at home is CPAP HS, supplied by [employer]. Pt denies current HHC/PT services but does go to [facility] clinic [facility]. Her  will transport at discharge. Awaiting MRI and Dr Gray's evaluation.              Demographic Summary       Row Name 10/28/24 1602       General Information    Admission Type inpatient    Arrived From emergency department    Referral Source admission list    Reason for Consult care coordination/care conference;discharge planning    Preferred Language English       Contact Information    Permission Granted to Share Info With                    Functional Status       Row Name 10/28/24 1603       Functional Status    Usual Activity Tolerance good    Current Activity Tolerance good       Functional Status, IADL    Medications independent    Meal Preparation independent    Housekeeping independent    Laundry independent    Shopping independent    If for any reason you need help with day-to-day activities such as bathing, preparing meals, shopping, managing finances, etc., do you get the help you need? I don't need any help             Megan Naegele, RN     Office Phone: [phone]  Office Cell: [phone]

## 2024-10-28 NOTE — PROGRESS NOTES
[facility] MEDICINE SERVICE  DAILY PROGRESS NOTE    NAME: Marilou VILCIHS Abbott  : 1970  MRN: [id number]      LOS: 2 days     PROVIDER OF SERVICE: Parvez Millan MD    Chief Complaint: Cellulitis of left foot    Subjective:     Interval History: Patient states her foot pain is tolerable.  Denies any fevers or chills overnight.        Review of Systems:   Review of Systems    Objective:     Vital Signs  Temp:  [97.4 °F (36.3 °C)-98 °F (36.7 °C)] 97.4 °F (36.3 °C)  Heart Rate:  [79-92] 79  Resp:  [16-18] 16  BP: (117-158)/(69-88) 128/69   Body mass index is 45.19 kg/m².    Physical Exam  Physical Exam  General Appearance:  Alert, cooperative, no distress, appears stated age  Head:  Normocephalic, without obvious abnormality, atraumatic  Eyes:  PERRL, conjunctiva/corneas clear, EOM's intact, fundi benign, both eyes  Ears:  Normal TM's and external ear canals, both ears  Nose: Nares normal, septum midline, mucosa normal, no drainage or sinus tenderness  Throat: Lips, mucosa, and tongue normal; teeth and gums normal  Neck: Supple, symmetrical, trachea midline, no adenopathy, thyroid: not enlarged, symmetric, no tenderness/mass/nodules, no carotid bruit or JVD  Lungs:   Clear to auscultation bilaterally, respirations unlabored  Heart:  Regular rate and rhythm, S1, S2 normal, no murmur, rub or gallop  Abdomen:  Soft, non-tender, bowel sounds active all four quadrants,  no masses, no organomegaly  Extremities: Left foot erythema and edema surrounding second and third toe  Pulses: 2+ and symmetric  Skin: Skin color, texture, turgor normal, no rashes or lesions  Neurologic: Normal         Diagnostic Data    Results from last 7 days   Lab Units 10/28/24  0350   WBC 10*3/mm3 5.42   HEMOGLOBIN g/dL 9.5*   HEMATOCRIT % 30.4*   PLATELETS 10*3/mm3 381   GLUCOSE mg/dL 253*   CREATININE mg/dL 0.81   BUN mg/dL 12   SODIUM mmol/L 136   POTASSIUM mmol/L 5.2   AST (SGOT) U/L 56*   ALT (SGPT) U/L 42*   ALK PHOS U/L 192*   BILIRUBIN  mg/dL 0.4   ANION GAP mmol/L 8.3       XR Foot 3+ View Left    Result Date: 10/26/2024  Impression: 1. Amputation of the first digit. 2. Soft tissue swelling of the foot and marked soft tissue swelling of the second and third digits. No radiopaque foreign body or gas within the soft tissues. 3. No conventional radiographic evidence of osteomyelitis. Electronically Signed: Fredy Stinson MD  10/26/2024 3:56 PM EDT  Workstation ID: [id number]       I reviewed the patient's new clinical results.    Assessment/Plan:     Active and Resolved Problems  Active Hospital Problems    Diagnosis  POA    **Cellulitis of left foot [L03.116]  Yes      Resolved Hospital Problems   No resolved problems to display.       Left foot cellulitis  -Patient still has some erythema and edema surrounding the left second and third toe consistent with cellulitis  -X-ray does not show any evidence of osteomyelitis however will obtain MRI to definitively confirm whether OM is present  -Continue IV antibiotics with Zosyn; vancomycin discontinued given negative MRSA swab  -Podiatry consult pending    DM type II, uncontrolled with hyperglycemia  -Increase Lantus to 40 Monty twice daily and prandial insulin to 15 units 3 times daily with meals as well as hide sliding scale  -A1c of 8 indicates fairly decent glycemic control prior to admission    Hypertension  -Continue lisinopril    Hypothyroidism  -Continue Synthroid    Morbid Obesity  -Counseled patient on diet and exercise to improve weight loss and comorbid conditions          VTE Prophylaxis:  Pharmacologic VTE prophylaxis orders are present.             Disposition Planning:     Barriers to Discharge: Possible surgical intervention, IV antibiotics  Anticipated Date of Discharge: 10/31  Place of Discharge: Home      Time: 40 minutes     Code Status and Medical Interventions: CPR (Attempt to Resuscitate); Full Support   Ordered at: 10/26/24 1942     Code Status (Patient has no pulse and is not  breathing):    CPR (Attempt to Resuscitate)     Medical Interventions (Patient has pulse or is breathing):    Full Support       Signature: Electronically signed by Parvez Millan MD, 10/28/24, 11:54 EDT.  [facility] Hospitalist Team

## 2024-10-28 NOTE — PLAN OF CARE
Goal Outcome Evaluation:  Plan of Care Reviewed With: patient           Outcome Evaluation: 53 y/o F with a history of insulin-dependent diabetes who presents to [facility] for complaints of increasing redness and swelling to her left second toe. At baseline, she is indep with all ADLs/IADLs, drives, and works. She lives with her  in a home that has 2 GOPAL. This date, pt able to get out of bed, don shoes, and walk household distance without AD. Pt educated on diabetes and wound healing strategies. Pt receptive. Pt appears at or near baseline. OT recommending pt d/c home at d/c.    Anticipated Discharge Disposition (OT): home

## 2024-10-28 NOTE — CONSULTS
10/28/24   Foot and Ankle Surgery - Consult  Provider: Dr. Kan Gray DPM  Location: [facility] Foot and Ankle    Subjective:  Marilou Abbott is a 54 y.o. female well-known to me.  Patient currently being treated in the wound care center for her left second toe.  Patient had a previous great toe amputation by myself several years ago.  Patient healed uneventfully.  Patient had been doing well until just recently.  Stated she was not feeling good over the weekend and passing out.  Stated that she made a decision to go to the emergency room which was the smart call.  Patient states that she feels much better.  Pictures taken from before she went to the hospital till now shows marked improvement in a positive response to the antibiotics.  Patient sitting in her chair with her foot elevated    Chief Complaint   Patient presents with    Wound Infection     Left foot 2nd toe wound.  Pt states has been there since June is going to wound clinic, looks worse today       Consulting Physician: House physician    Reason for Consult: Cellulitis second toe left foot    HPI: Previous great toe amputation by myself several years ago.  Had been doing well until recently and developed a sore on top of her PIPJ joint.  Being treated at the wound care center for this but developed cellulitis started having issues and decided go to the emergency room where she was admitted.  Has been on IV antibiotics and has had a positive response    Allergies   Allergen Reactions    Dust Mite Extract Unknown - Low Severity    Molds & Smuts Unknown - Low Severity    Bactrim [Sulfamethoxazole-Trimethoprim] Rash     blisters    Trimethoprim Rash       Past Medical History:   Diagnosis Date    Anxiety     Asthma     Bruise     On stomach for 3 months    Depression     Diabetes mellitus     Peripheral neuropathy     Sleep apnea     Thyroid disease     Type 2 diabetes mellitus        Past Surgical History:   Procedure Laterality Date    AMPUTATION       CHOLECYSTECTOMY      GASTRIC BYPASS      LEG SURGERY      THYROIDECTOMY         Family History   Problem Relation Age of Onset    Cancer Mother     Cancer Father     Diabetes Father     Heart disease Paternal Grandfather        Social History     Socioeconomic History    Marital status:    Tobacco Use    Smoking status: Never    Smokeless tobacco: Never   Vaping Use    Vaping status: Never Used   Substance and Sexual Activity    Alcohol use: No    Drug use: No    Sexual activity: Defer          Current Facility-Administered Medications:     acetaminophen (TYLENOL) tablet 1,000 mg, 1,000 mg, Oral, Q4H PRN **OR** acetaminophen (TYLENOL) 160 MG/5ML oral solution 650 mg, 650 mg, Oral, Q4H PRN **OR** acetaminophen (TYLENOL) suppository 650 mg, 650 mg, Rectal, Q4H PRN, Etienne Aguilar MD    albuterol (PROVENTIL) nebulizer solution 0.083% 2.5 mg/3mL, 2.5 mg, Nebulization, Q4H PRN, Etienne Aguilar MD, 2.5 mg at 10/26/24 2355    benzonatate (TESSALON) capsule 100 mg, 100 mg, Oral, TID PRN, Llanes Alvarez, Carlos, MD, 100 mg at 10/27/24 1203    sennosides-docusate (PERICOLACE) 8.6-50 MG per tablet 2 tablet, 2 tablet, Oral, BID PRN **AND** polyethylene glycol (MIRALAX) packet 17 g, 17 g, Oral, Daily PRN **AND** bisacodyl (DULCOLAX) EC tablet 5 mg, 5 mg, Oral, Daily PRN **AND** bisacodyl (DULCOLAX) suppository 10 mg, 10 mg, Rectal, Daily PRN, Etienne Aguilar MD    budesonide-formoterol (SYMBICORT) 160-4.5 MCG/ACT inhaler 2 puff, 2 puff, Inhalation, BID - RT, Etienne Aguliar MD, 2 puff at 10/28/24 0835    buPROPion XL (WELLBUTRIN XL) 24 hr tablet 300 mg, 300 mg, Oral, Daily, Etienne Aguilar MD, 300 mg at 10/28/24 1008    Calcium Replacement - Follow Nurse / BPA Driven Protocol, , Does not apply, PRN, Etienne Aguilar MD    dextrose (D50W) (25 g/50 mL) IV injection 25 g, 25 g, Intravenous, Q15 Min PRN, Romel Hendricks MD    dextrose (GLUTOSE) oral gel 15 g, 15 g, Oral, Q15 Min PRN, Romel Hendricks,  MD    divalproex (DEPAKOTE ER) 24 hr tablet 1,500 mg, 1,500 mg, Oral, Nightly, Etienne Aguilar MD, 1,500 mg at 10/27/24 2106    gabapentin (NEURONTIN) capsule 300 mg, 300 mg, Oral, Q8H, Etienne Aguilar MD, 300 mg at 10/28/24 0530    heparin (porcine) 5000 UNIT/ML injection 5,000 Units, 5,000 Units, Subcutaneous, Q8H, Etienne Aguilar MD, 5,000 Units at 10/28/24 0530    HYDROmorphone (DILAUDID) injection 0.25 mg, 0.25 mg, Intravenous, Q6H PRN, Etienne Aguilar MD    insulin glargine (LANTUS, SEMGLEE) injection 40 Units, 40 Units, Subcutaneous, Q12H, Parvez Millan MD    insulin lispro (HUMALOG/ADMELOG) injection 15 Units, 15 Units, Subcutaneous, TID With Meals, Parvez Mlilan MD, 15 Units at 10/28/24 1200    insulin lispro (HUMALOG/ADMELOG) injection 3-14 Units, 3-14 Units, Subcutaneous, 4x Daily AC & at Bedtime, Romel Hendricks MD, 12 Units at 10/28/24 1200    levothyroxine (SYNTHROID, LEVOTHROID) tablet 200 mcg, 200 mcg, Oral, Q AM, Etienne Aguilar MD, 200 mcg at 10/28/24 0530    lisinopril (PRINIVIL,ZESTRIL) tablet 20 mg, 20 mg, Oral, Q24H, Etienne Aguilar MD, 20 mg at 10/28/24 1008    Magnesium Standard Dose Replacement - Follow Nurse / BPA Driven Protocol, , Does not apply, PRN, Etienne Aguilar MD    nitroglycerin (NITROSTAT) SL tablet 0.4 mg, 0.4 mg, Sublingual, Q5 Min PRN, Etienne Aguilar MD    pantoprazole (PROTONIX) EC tablet 40 mg, 40 mg, Oral, Q AM, Etienne Aguilar MD, 40 mg at 10/28/24 0530    Pharmacy to Dose Zosyn, , Does not apply, Continuous PRN, Etienne Aguilar MD    Phosphorus Replacement - Follow Nurse / BPA Driven Protocol, , Does not apply, Jeff HENDERSON Benjamin C, MD    piperacillin-tazobactam (ZOSYN) 3.375 g IVPB in 100 mL NS MBP (CD), 3.375 g, Intravenous, Q8H, Etienne Aguilar MD, 3.375 g at 10/28/24 0530    Potassium Replacement - Follow Nurse / BPA Driven Protocol, , Does not apply, Jeff HENDERSON Benjamin C, MD    [COMPLETED] Insert Peripheral IV, , , Once  "**AND** sodium chloride 0.9 % flush 10 mL, 10 mL, Intravenous, PRN, Chitra Liao, APRN    sodium chloride 0.9 % flush 10 mL, 10 mL, Intravenous, Q12H, Etienne Aguilar MD, 10 mL at 10/28/24 0900    sodium chloride 0.9 % flush 10 mL, 10 mL, Intravenous, PRN, Etienne Aguilar MD    ziprasidone (GEODON) capsule 60 mg, 60 mg, Oral, Nightly, Llanes Alvarez, Carlos, MD, 60 mg at 10/27/24 2115    Review of Systems:  General: Denies fever, chills, fatigue, and weakness.  Eyes: Denies vision loss, blurry vision, and excessive redness.  ENT: Denies hearing issues and difficulty swallowing.  Cardiovascular: Denies palpitations, chest pain, or syncopal episodes.  Respiratory: Denies shortness of breath, wheezing, and coughing.  GI: Denies abdominal pain, nausea, and vomiting.   Musculoskeletal: Contracted toe on the left foot second at the proximal interphalangeal joint with adduction noted   Derm: Open wound at the proximal interphalangeal joint of the second toe left foot with cellulitis encompassing the whole toe   neuro: Denies headaches, numbness, loss of coordination, and tremors.  Psych: Denies anxiety and depression.  Endocrine: Denies temperature intolerance and changes in appetite.  Heme: Denies bleeding disorders or abnormal bruising.     Objective   /69 (BP Location: Right arm, Patient Position: Sitting)   Pulse 79   Temp 97.4 °F (36.3 °C) (Oral)   Resp 16   Ht 172.7 cm (68\")   Wt 135 kg (297 lb 3.2 oz)   SpO2 97%   BMI 45.19 kg/m²     Foot/Ankle Exam  Vascular: Pulses left foot easily palpable  Skin: Cellulitis accompanying the second toe of the left foot down to the second MPJ.  There is noted to be ulcerations present at the proximal interphalangeal joint with some fibrous tissue.  They appear to be more superficial.  There is mild serous drainage noted.  No malodor they are well-defined.  Pictures before admission to now shows improvement in the cellulitis which was extending down her foot " prior to being admitted and today the cellulitis is very distinct and does not extend into the foot.  M/S: There is deviation of the second toe present within being contracted at the proximal interphalangeal joint and adduction of the distal portion.  Previous great toe amputation noted.  Neuro:.  Light touch and protective sensations noted to be absent in the left foot.          Culture results from last 30 days   Lab 10/01/24  0820   WOUNDCX No growth at 3 days       Results from last 7 days   Lab Units 10/28/24  0350   WBC 10*3/mm3 5.42   HEMOGLOBIN g/dL 9.5*   HEMATOCRIT % 30.4*   PLATELETS 10*3/mm3 381       Assessment & Plan   Cellulitis left toe  Non-insulin-dependent diabetes with neuropathy and foot ulceration  Derangement of foot with missing great toe left  Hammertoe second left with ulceration at the proximal interphalangeal joint    Dressing applied consisted Betadine gauze and a Band-Aid along with surgical shoe  Will begin twice daily dressing changes.  Patient was have surgical shoe on when getting up.  Awaiting  MRI.  Patient will need some type of surgical intervention however MRI will determine if something needs to be done now or if it will be best to send her home on antibiotics let the cellulitis resolved and at that point once when things are stable and looking better then proceed with surgical intervention.  Surgical intervention could consist of some form of amputation or possibly some type of arthroplasty to improve the shape of the toe.  If no infection on MRI then at that point most likely no surgery while in the hospital and can be sent home.  If osteomyelitis is noted then we will discuss surgical versus medical treatment for this.  Will see tomorrow in the evening after office and after MRI is completed and read.  Thank you for the consultation and allowing me to participate in this patient's care. Please call with any additional questions or concerns.     Note is dictated utilizing  voice recognition software. Unfortunately this leads to occasional typographical errors. I apologize in advance if the situation occurs. If questions occur please do not hesitate to call our office.

## 2024-10-29 ENCOUNTER — APPOINTMENT (OUTPATIENT)
Dept: MRI IMAGING | Facility: HOSPITAL | Age: 54
End: 2024-10-29
Payer: OTHER GOVERNMENT

## 2024-10-29 PROBLEM — L97.509 ULCER OF TOE DUE TO DIABETES MELLITUS: Status: ACTIVE | Noted: 2024-10-26

## 2024-10-29 PROBLEM — E11.621 ULCER OF TOE DUE TO DIABETES MELLITUS: Status: ACTIVE | Noted: 2024-10-26

## 2024-10-29 LAB
ALBUMIN SERPL-MCNC: 3.4 G/DL (ref 3.5–5.2)
ALBUMIN SERPL-MCNC: 3.8 G/DL (ref 3.5–5.2)
ALBUMIN/GLOB SERPL: 1 G/DL
ALBUMIN/GLOB SERPL: 1.1 G/DL
ALP SERPL-CCNC: 187 U/L (ref 39–117)
ALP SERPL-CCNC: 207 U/L (ref 39–117)
ALT SERPL W P-5'-P-CCNC: 31 U/L (ref 1–33)
ALT SERPL W P-5'-P-CCNC: 31 U/L (ref 1–33)
ANION GAP SERPL CALCULATED.3IONS-SCNC: 9.5 MMOL/L (ref 5–15)
ANION GAP SERPL CALCULATED.3IONS-SCNC: 9.7 MMOL/L (ref 5–15)
AST SERPL-CCNC: 34 U/L (ref 1–32)
AST SERPL-CCNC: 37 U/L (ref 1–32)
BASOPHILS # BLD AUTO: 0.07 10*3/MM3 (ref 0–0.2)
BASOPHILS # BLD AUTO: 0.08 10*3/MM3 (ref 0–0.2)
BASOPHILS NFR BLD AUTO: 0.9 % (ref 0–1.5)
BASOPHILS NFR BLD AUTO: 1.1 % (ref 0–1.5)
BILIRUB SERPL-MCNC: 0.4 MG/DL (ref 0–1.2)
BILIRUB SERPL-MCNC: 0.4 MG/DL (ref 0–1.2)
BUN SERPL-MCNC: 10 MG/DL (ref 6–20)
BUN SERPL-MCNC: 11 MG/DL (ref 6–20)
BUN/CREAT SERPL: 12 (ref 7–25)
BUN/CREAT SERPL: 13.2 (ref 7–25)
CALCIUM SPEC-SCNC: 9 MG/DL (ref 8.6–10.5)
CALCIUM SPEC-SCNC: 9.5 MG/DL (ref 8.6–10.5)
CHLORIDE SERPL-SCNC: 101 MMOL/L (ref 98–107)
CHLORIDE SERPL-SCNC: 97 MMOL/L (ref 98–107)
CO2 SERPL-SCNC: 23.3 MMOL/L (ref 22–29)
CO2 SERPL-SCNC: 26.5 MMOL/L (ref 22–29)
CREAT SERPL-MCNC: 0.76 MG/DL (ref 0.57–1)
CREAT SERPL-MCNC: 0.92 MG/DL (ref 0.57–1)
DEPRECATED RDW RBC AUTO: 50.1 FL (ref 37–54)
DEPRECATED RDW RBC AUTO: 50.4 FL (ref 37–54)
EGFRCR SERPLBLD CKD-EPI 2021: 74.1 ML/MIN/1.73
EGFRCR SERPLBLD CKD-EPI 2021: 93.3 ML/MIN/1.73
EOSINOPHIL # BLD AUTO: 0.15 10*3/MM3 (ref 0–0.4)
EOSINOPHIL # BLD AUTO: 0.18 10*3/MM3 (ref 0–0.4)
EOSINOPHIL NFR BLD AUTO: 2 % (ref 0.3–6.2)
EOSINOPHIL NFR BLD AUTO: 2.5 % (ref 0.3–6.2)
ERYTHROCYTE [DISTWIDTH] IN BLOOD BY AUTOMATED COUNT: 15 % (ref 12.3–15.4)
ERYTHROCYTE [DISTWIDTH] IN BLOOD BY AUTOMATED COUNT: 15.1 % (ref 12.3–15.4)
GLOBULIN UR ELPH-MCNC: 3.1 GM/DL
GLOBULIN UR ELPH-MCNC: 3.7 GM/DL
GLUCOSE BLDC GLUCOMTR-MCNC: 239 MG/DL (ref 70–105)
GLUCOSE BLDC GLUCOMTR-MCNC: 288 MG/DL (ref 70–105)
GLUCOSE BLDC GLUCOMTR-MCNC: 310 MG/DL (ref 70–105)
GLUCOSE BLDC GLUCOMTR-MCNC: 333 MG/DL (ref 70–105)
GLUCOSE SERPL-MCNC: 200 MG/DL (ref 65–99)
GLUCOSE SERPL-MCNC: 224 MG/DL (ref 65–99)
HCT VFR BLD AUTO: 30.9 % (ref 34–46.6)
HCT VFR BLD AUTO: 34.8 % (ref 34–46.6)
HGB BLD-MCNC: 10.9 G/DL (ref 12–15.9)
HGB BLD-MCNC: 9.9 G/DL (ref 12–15.9)
IMM GRANULOCYTES # BLD AUTO: 0.49 10*3/MM3 (ref 0–0.05)
IMM GRANULOCYTES # BLD AUTO: 0.52 10*3/MM3 (ref 0–0.05)
IMM GRANULOCYTES NFR BLD AUTO: 6.9 % (ref 0–0.5)
IMM GRANULOCYTES NFR BLD AUTO: 7 % (ref 0–0.5)
LYMPHOCYTES # BLD AUTO: 2.35 10*3/MM3 (ref 0.7–3.1)
LYMPHOCYTES # BLD AUTO: 2.46 10*3/MM3 (ref 0.7–3.1)
LYMPHOCYTES NFR BLD AUTO: 31.7 % (ref 19.6–45.3)
LYMPHOCYTES NFR BLD AUTO: 34.6 % (ref 19.6–45.3)
MAGNESIUM SERPL-MCNC: 2.2 MG/DL (ref 1.6–2.6)
MAGNESIUM SERPL-MCNC: 2.2 MG/DL (ref 1.6–2.6)
MCH RBC QN AUTO: 28.9 PG (ref 26.6–33)
MCH RBC QN AUTO: 29.4 PG (ref 26.6–33)
MCHC RBC AUTO-ENTMCNC: 31.3 G/DL (ref 31.5–35.7)
MCHC RBC AUTO-ENTMCNC: 32 G/DL (ref 31.5–35.7)
MCV RBC AUTO: 91.7 FL (ref 79–97)
MCV RBC AUTO: 92.3 FL (ref 79–97)
MONOCYTES # BLD AUTO: 0.69 10*3/MM3 (ref 0.1–0.9)
MONOCYTES # BLD AUTO: 0.69 10*3/MM3 (ref 0.1–0.9)
MONOCYTES NFR BLD AUTO: 9.3 % (ref 5–12)
MONOCYTES NFR BLD AUTO: 9.7 % (ref 5–12)
NEUTROPHILS NFR BLD AUTO: 3.22 10*3/MM3 (ref 1.7–7)
NEUTROPHILS NFR BLD AUTO: 3.64 10*3/MM3 (ref 1.7–7)
NEUTROPHILS NFR BLD AUTO: 45.2 % (ref 42.7–76)
NEUTROPHILS NFR BLD AUTO: 49.1 % (ref 42.7–76)
NRBC BLD AUTO-RTO: 0 /100 WBC (ref 0–0.2)
NRBC BLD AUTO-RTO: 0 /100 WBC (ref 0–0.2)
PHOSPHATE SERPL-MCNC: 2.6 MG/DL (ref 2.5–4.5)
PHOSPHATE SERPL-MCNC: 2.9 MG/DL (ref 2.5–4.5)
PLATELET # BLD AUTO: 400 10*3/MM3 (ref 140–450)
PLATELET # BLD AUTO: 456 10*3/MM3 (ref 140–450)
PMV BLD AUTO: 10 FL (ref 6–12)
PMV BLD AUTO: 10 FL (ref 6–12)
POTASSIUM SERPL-SCNC: 4.8 MMOL/L (ref 3.5–5.2)
POTASSIUM SERPL-SCNC: 5.1 MMOL/L (ref 3.5–5.2)
PROT SERPL-MCNC: 6.5 G/DL (ref 6–8.5)
PROT SERPL-MCNC: 7.5 G/DL (ref 6–8.5)
RBC # BLD AUTO: 3.37 10*6/MM3 (ref 3.77–5.28)
RBC # BLD AUTO: 3.77 10*6/MM3 (ref 3.77–5.28)
SODIUM SERPL-SCNC: 133 MMOL/L (ref 136–145)
SODIUM SERPL-SCNC: 134 MMOL/L (ref 136–145)
WBC NRBC COR # BLD AUTO: 7.12 10*3/MM3 (ref 3.4–10.8)
WBC NRBC COR # BLD AUTO: 7.42 10*3/MM3 (ref 3.4–10.8)

## 2024-10-29 PROCEDURE — 87205 SMEAR GRAM STAIN: CPT | Performed by: NURSE PRACTITIONER

## 2024-10-29 PROCEDURE — 83735 ASSAY OF MAGNESIUM: CPT | Performed by: HOSPITALIST

## 2024-10-29 PROCEDURE — 85025 COMPLETE CBC W/AUTO DIFF WBC: CPT | Performed by: HOSPITALIST

## 2024-10-29 PROCEDURE — 94664 DEMO&/EVAL PT USE INHALER: CPT

## 2024-10-29 PROCEDURE — 87147 CULTURE TYPE IMMUNOLOGIC: CPT | Performed by: NURSE PRACTITIONER

## 2024-10-29 PROCEDURE — 63710000001 INSULIN LISPRO (HUMAN) PER 5 UNITS: Performed by: HOSPITALIST

## 2024-10-29 PROCEDURE — 94799 UNLISTED PULMONARY SVC/PX: CPT

## 2024-10-29 PROCEDURE — 73720 MRI LWR EXTREMITY W/O&W/DYE: CPT

## 2024-10-29 PROCEDURE — A9579 GAD-BASE MR CONTRAST NOS,1ML: HCPCS | Performed by: INTERNAL MEDICINE

## 2024-10-29 PROCEDURE — 80053 COMPREHEN METABOLIC PANEL: CPT | Performed by: HOSPITALIST

## 2024-10-29 PROCEDURE — 82948 REAGENT STRIP/BLOOD GLUCOSE: CPT

## 2024-10-29 PROCEDURE — 63710000001 INSULIN LISPRO (HUMAN) PER 5 UNITS: Performed by: INTERNAL MEDICINE

## 2024-10-29 PROCEDURE — 87186 SC STD MICRODIL/AGAR DIL: CPT | Performed by: NURSE PRACTITIONER

## 2024-10-29 PROCEDURE — 25010000002 VANCOMYCIN 2-0.9 GM/500ML-% SOLUTION: Performed by: NURSE PRACTITIONER

## 2024-10-29 PROCEDURE — 84100 ASSAY OF PHOSPHORUS: CPT | Performed by: HOSPITALIST

## 2024-10-29 PROCEDURE — 87070 CULTURE OTHR SPECIMN AEROBIC: CPT | Performed by: NURSE PRACTITIONER

## 2024-10-29 PROCEDURE — 63710000001 INSULIN GLARGINE PER 5 UNITS: Performed by: INTERNAL MEDICINE

## 2024-10-29 PROCEDURE — 25010000002 CEFAZOLIN PER 500 MG: Performed by: INTERNAL MEDICINE

## 2024-10-29 PROCEDURE — 94761 N-INVAS EAR/PLS OXIMETRY MLT: CPT

## 2024-10-29 PROCEDURE — 25010000002 CEFEPIME PER 500 MG: Performed by: NURSE PRACTITIONER

## 2024-10-29 PROCEDURE — 25010000002 GADOTERIDOL PER 1 ML: Performed by: INTERNAL MEDICINE

## 2024-10-29 RX ORDER — VANCOMYCIN 2 GRAM/500 ML IN 0.9 % SODIUM CHLORIDE INTRAVENOUS
2000 ONCE
Status: COMPLETED | OUTPATIENT
Start: 2024-10-29 | End: 2024-10-29

## 2024-10-29 RX ADMIN — PANTOPRAZOLE SODIUM 40 MG: 40 TABLET, DELAYED RELEASE ORAL at 05:15

## 2024-10-29 RX ADMIN — INSULIN LISPRO 10 UNITS: 100 INJECTION, SOLUTION INTRAVENOUS; SUBCUTANEOUS at 20:00

## 2024-10-29 RX ADMIN — GABAPENTIN 300 MG: 300 CAPSULE ORAL at 05:15

## 2024-10-29 RX ADMIN — GABAPENTIN 300 MG: 300 CAPSULE ORAL at 20:00

## 2024-10-29 RX ADMIN — Medication 10 ML: at 08:30

## 2024-10-29 RX ADMIN — Medication 2000 MG: at 20:01

## 2024-10-29 RX ADMIN — INSULIN LISPRO 5 UNITS: 100 INJECTION, SOLUTION INTRAVENOUS; SUBCUTANEOUS at 08:22

## 2024-10-29 RX ADMIN — ZIPRASIDONE HYDROCHLORIDE 60 MG: 20 CAPSULE ORAL at 20:00

## 2024-10-29 RX ADMIN — LISINOPRIL 20 MG: 20 TABLET ORAL at 08:23

## 2024-10-29 RX ADMIN — INSULIN GLARGINE 40 UNITS: 100 INJECTION, SOLUTION SUBCUTANEOUS at 08:22

## 2024-10-29 RX ADMIN — DIVALPROEX SODIUM 1500 MG: 500 TABLET, EXTENDED RELEASE ORAL at 20:00

## 2024-10-29 RX ADMIN — LEVOTHYROXINE SODIUM 200 MCG: 0.1 TABLET ORAL at 05:15

## 2024-10-29 RX ADMIN — INSULIN GLARGINE 40 UNITS: 100 INJECTION, SOLUTION SUBCUTANEOUS at 20:00

## 2024-10-29 RX ADMIN — CEFEPIME 2000 MG: 2 INJECTION, POWDER, FOR SOLUTION INTRAVENOUS at 18:12

## 2024-10-29 RX ADMIN — ACETAMINOPHEN 1000 MG: 500 TABLET, FILM COATED ORAL at 16:00

## 2024-10-29 RX ADMIN — BUDESONIDE AND FORMOTEROL FUMARATE DIHYDRATE 2 PUFF: 160; 4.5 AEROSOL RESPIRATORY (INHALATION) at 07:04

## 2024-10-29 RX ADMIN — BUPROPION HYDROCHLORIDE 300 MG: 150 TABLET, EXTENDED RELEASE ORAL at 08:23

## 2024-10-29 RX ADMIN — Medication 10 ML: at 20:06

## 2024-10-29 RX ADMIN — SODIUM CHLORIDE 2000 MG: 900 INJECTION INTRAVENOUS at 13:37

## 2024-10-29 RX ADMIN — INSULIN LISPRO 15 UNITS: 100 INJECTION, SOLUTION INTRAVENOUS; SUBCUTANEOUS at 11:41

## 2024-10-29 RX ADMIN — BUDESONIDE AND FORMOTEROL FUMARATE DIHYDRATE 2 PUFF: 160; 4.5 AEROSOL RESPIRATORY (INHALATION) at 19:38

## 2024-10-29 RX ADMIN — INSULIN LISPRO 8 UNITS: 100 INJECTION, SOLUTION INTRAVENOUS; SUBCUTANEOUS at 17:10

## 2024-10-29 RX ADMIN — GADOTERIDOL 20 ML: 279.3 INJECTION, SOLUTION INTRAVENOUS at 01:23

## 2024-10-29 RX ADMIN — INSULIN LISPRO 15 UNITS: 100 INJECTION, SOLUTION INTRAVENOUS; SUBCUTANEOUS at 08:23

## 2024-10-29 RX ADMIN — INSULIN LISPRO 15 UNITS: 100 INJECTION, SOLUTION INTRAVENOUS; SUBCUTANEOUS at 17:11

## 2024-10-29 RX ADMIN — GABAPENTIN 300 MG: 300 CAPSULE ORAL at 13:37

## 2024-10-29 RX ADMIN — SODIUM CHLORIDE 2000 MG: 900 INJECTION INTRAVENOUS at 05:15

## 2024-10-29 RX ADMIN — INSULIN LISPRO 10 UNITS: 100 INJECTION, SOLUTION INTRAVENOUS; SUBCUTANEOUS at 11:40

## 2024-10-29 NOTE — CONSULTS
Infectious Diseases Consult Note    Referring Provider: Luis Alberto Mccoy,*    Reason for Consultation: Left foot wound    Patient Care Team:  Kishore Penn MD as PCP - General    Chief complaint wound to the left second toe    Subjective     History of present illness:      This is a 54-year-old female presents to the hospital on 10/26/2024 with complaints of a worsening left second toe wound.  Wound has been there approximately since June but recently became more painful swollen and tender.  Patient had some fatigue and syncope before admission.  Previous  left great toe amputation.  Patient is diabetic    Review of Systems   Review of Systems   Constitutional: Negative.  Positive for fatigue.   HENT: Negative.     Eyes: Negative.    Respiratory: Negative.     Cardiovascular: Negative.    Gastrointestinal: Negative.    Endocrine: Negative.    Genitourinary: Negative.    Musculoskeletal: Negative.    Skin: Negative.  Positive for color change and wound.   Neurological: Negative.    Psychiatric/Behavioral: Negative.     All other systems reviewed and are negative.      Medications  Medications Prior to Admission   Medication Sig Dispense Refill Last Dose/Taking    albuterol sulfate  (90 Base) MCG/ACT inhaler Inhale 2 puffs Every 4 (Four) Hours As Needed for Wheezing or Shortness of Air. 18 g 0 Taking As Needed    amoxicillin (AMOXIL) 875 MG tablet Take 1 tablet by mouth 2 (Two) Times a Day for 10 days. 20 tablet 0 Taking    buPROPion XL (WELLBUTRIN XL) 300 MG 24 hr tablet Take 1 tablet by mouth Every Morning.   Taking    dicyclomine (Bentyl) 10 MG capsule Take 1 capsule by mouth 4 (Four) Times a Day As Needed.   Taking As Needed    divalproex (DEPAKOTE ER) 500 MG 24 hr tablet Take 3 tablets by mouth Every Night.   10/25/2024    gabapentin (NEURONTIN) 300 MG capsule Take 1 capsule by mouth 3 (Three) Times a Day. 90 capsule 5 Taking    insulin aspart (NovoLOG) 100 UNIT/ML patient supplied pump  Inject 30-50 Units under the skin into the appropriate area as directed 3 (Three) Times a Day Before Meals.   Taking    Insulin Glargine (LANTUS SOLOSTAR) 100 UNIT/ML injection pen Inject 80 Units under the skin into the appropriate area as directed Daily.   Taking    levothyroxine (SYNTHROID, LEVOTHROID) 200 MCG tablet Take 1 tablet by mouth Daily.   Taking    lisinopril (PRINIVIL,ZESTRIL) 20 MG tablet Take 1 tablet by mouth Daily.   Taking    omeprazole (priLOSEC) 40 MG capsule Take 1 capsule by mouth 2 (Two) Times a Day.   Taking    Ozempic, 0.25 or 0.5 MG/DOSE, 2 MG/3ML solution pen-injector Inject 0.25 mg under the skin into the appropriate area as directed. Wed   Taking    ziprasidone (GEODON) 60 MG capsule Take 1 capsule by mouth Every Evening.   Taking    glucagon (GLUCAGEN HYPOKIT) 1 MG injection GLUCAGEN HYPOKIT 1 MG SOLR          History  Past Medical History:   Diagnosis Date    Anxiety     Asthma     Bruise     On stomach for 3 months    Depression     Diabetes mellitus     Peripheral neuropathy     Sleep apnea     Thyroid disease     Type 2 diabetes mellitus      Past Surgical History:   Procedure Laterality Date    AMPUTATION      CHOLECYSTECTOMY      GASTRIC BYPASS      LEG SURGERY      THYROIDECTOMY         Family History  Family History   Problem Relation Age of Onset    Cancer Mother     Cancer Father     Diabetes Father     Heart disease Paternal Grandfather        Social History   reports that she has never smoked. She has never used smokeless tobacco. She reports that she does not drink alcohol and does not use drugs.    Allergies  Dust mite extract, Molds & smuts, Bactrim [sulfamethoxazole-trimethoprim], and Trimethoprim    Objective     Vital Signs   Vital Signs (last 24 hours)         10/28 0700  10/29 0659 10/29 0700  10/29 1733   Most Recent      Temp (°F) 97.4 -  98       98 (36.7) 10/29 0358    Heart Rate 69 -  91      71     71 10/29 0704    Resp 16 -  18      18     18 10/29 0704    BP  128/69 -  158/88       146/80 10/29 0358    SpO2 (%) 93 -  100    0 -  95     0 10/29 0705    Oxygen Concentration (%)   21       21 10/28 1935            Physical Exam:  Physical Exam  Vitals and nursing note reviewed.   Constitutional:       General: She is not in acute distress.     Appearance: Normal appearance. She is well-developed and normal weight. She is not diaphoretic.   HENT:      Head: Normocephalic and atraumatic.   Eyes:      General: No scleral icterus.     Extraocular Movements: Extraocular movements intact.      Conjunctiva/sclera: Conjunctivae normal.      Pupils: Pupils are equal, round, and reactive to light.   Cardiovascular:      Rate and Rhythm: Normal rate and regular rhythm.      Heart sounds: Normal heart sounds, S1 normal and S2 normal. No murmur heard.  Pulmonary:      Effort: Pulmonary effort is normal. No respiratory distress.      Breath sounds: Normal breath sounds. No stridor. No wheezing or rales.   Chest:      Chest wall: No tenderness.   Abdominal:      General: Bowel sounds are normal. There is no distension.      Palpations: Abdomen is soft. There is no mass.      Tenderness: There is no abdominal tenderness. There is no guarding.   Musculoskeletal:         General: No swelling, tenderness or deformity. Normal range of motion.      Cervical back: Neck supple.   Skin:     General: Skin is warm and dry.      Coloration: Skin is not pale.      Findings: No bruising, erythema or rash.      Comments: Previous great toe amputation with healed incision.  Patient's left second toe has significant edema erythema and a open wound to the plantar aspect of the toe with purulent drainage.  There is some cellulitic changes to the dorsal aspect of the foot.  Pulses are palpable   Neurological:      General: No focal deficit present.      Mental Status: She is alert and oriented to person, place, and time. Mental status is at baseline.      Cranial Nerves: No cranial nerve deficit.    Psychiatric:         Mood and Affect: Mood normal.                Microbiology  Microbiology Results (last 10 days)       Procedure Component Value - Date/Time    Respiratory Panel PCR w/COVID-19(SARS-CoV-2) SHAYNE/JAMESON/ANDREW/PAD/COR/DARRYL In-House, NP Swab in UTM/VTM, 2 HR TAT - Swab, Nasopharynx [[id number]]  (Normal) Collected: 10/26/24 1807    Lab Status: Final result Specimen: Swab from Nasopharynx Updated: 10/26/24 1859     ADENOVIRUS, PCR Not Detected     Coronavirus 229E Not Detected     Coronavirus HKU1 Not Detected     Coronavirus NL63 Not Detected     Coronavirus OC43 Not Detected     COVID19 Not Detected     Human Metapneumovirus Not Detected     Human Rhinovirus/Enterovirus Not Detected     Influenza A PCR Not Detected     Influenza B PCR Not Detected     Parainfluenza Virus 1 Not Detected     Parainfluenza Virus 2 Not Detected     Parainfluenza Virus 3 Not Detected     Parainfluenza Virus 4 Not Detected     RSV, PCR Not Detected     Bordetella pertussis pcr Not Detected     Bordetella parapertussis PCR Not Detected     Chlamydophila pneumoniae PCR Not Detected     Mycoplasma pneumo by PCR Not Detected    Narrative:      In the setting of a positive respiratory panel with a viral infection PLUS a negative procalcitonin without other underlying concern for bacterial infection, consider observing off antibiotics or discontinuation of antibiotics and continue supportive care. If the respiratory panel is positive for atypical bacterial infection (Bordetella pertussis, Chlamydophila pneumoniae, or Mycoplasma pneumoniae), consider antibiotic de-escalation to target atypical bacterial infection.    Blood Culture - Blood, Arm, Right [[id number]]  (Normal) Collected: 10/26/24 1652    Lab Status: Preliminary result Specimen: Blood from Arm, Right Updated: 10/29/24 1701     Blood Culture No growth at 3 days    Blood Culture - Blood, Arm, Left [[id number]]  (Normal) Collected: 10/26/24 1609    Lab Status: Preliminary result  Specimen: Blood from Arm, Left Updated: 10/29/24 1630     Blood Culture No growth at 3 days            Laboratory  Results from last 7 days   Lab Units 10/29/24  0525   WBC 10*3/mm3 7.12   HEMOGLOBIN g/dL 9.9*   HEMATOCRIT % 30.9*   PLATELETS 10*3/mm3 400     Results from last 7 days   Lab Units 10/29/24  0525   SODIUM mmol/L 134*   POTASSIUM mmol/L 5.1   CHLORIDE mmol/L 101   CO2 mmol/L 23.3   BUN mg/dL 10   CREATININE mg/dL 0.76   GLUCOSE mg/dL 224*   CALCIUM mg/dL 9.0     Results from last 7 days   Lab Units 10/29/24  0525   SODIUM mmol/L 134*   POTASSIUM mmol/L 5.1   CHLORIDE mmol/L 101   CO2 mmol/L 23.3   BUN mg/dL 10   CREATININE mg/dL 0.76   GLUCOSE mg/dL 224*   CALCIUM mg/dL 9.0     Results from last 7 days   Lab Units 10/26/24  1609   CK TOTAL U/L 52     Results from last 7 days   Lab Units 10/26/24  1609   SED RATE mm/hr 24           Radiology  Imaging Results (Last 72 Hours)       Procedure Component Value Units Date/Time    MRI Foot Left With & Without Contrast [[id number]] Collected: 10/29/24 0828     Updated: 10/29/24 0841    Narrative:      MRI FOOT LEFT W WO CONTRAST    Date of Exam: 10/29/2024 12:57 AM EDT    Indication: Possible osteomyelitis; erythema and edema of 2nd & 3rd digits. History of amputation great toe.     Comparison: Foot radiograph 10/26/2024, left foot MRI 1/30/2015    Technique:  Routine multiplanar/multisequence sequence images of the left foot were obtained before and after the uneventful administration of Prohance.        Findings:  There is osseous destruction and abnormal marrow signal intensity centered at the second proximal interphalangeal joint. The proximal phalanx has homogeneous low T1 high T2 signal intensity involving the distal 2 cm. There is loss of normal cortex. There   is flexion of proximal to phalangeal joint. The middle phalanx has abnormal low T1 high T2 signal intensity involving its entirety. Ulceration centered at the proximal interphalangeal joint. There  is a probable remote fracture of the base of the   proximal phalanx. There is no drainable fluid collection to suggest abscess. There is amputation of the great toe. There is flexion at the third fourth and fifth proximal to phalangeal joint. There is severe arthritis of the first metatarsal phalangeal   joint and second metatarsal phalangeal joint. There is moderate joint space narrowing of the third metatarsophalangeal joint. There is medial subluxation proximal phalanx of the third digit. There is severe subtalar joint space narrowing. Prominent   dorsal osteophytes. Moderate subtalar joint space narrowing. Mild navicular cuneiform joint space narrowing.    There is atrophy intrinsic muscles of the foot. The flexor extensor tendons are intact. There is nonspecific dorsal soft tissue edema of the foot. Small intermetatarsal bursal collection between the first and second metatarsal head measuring up to 9 mm   and small intermetatarsal bursal collection between the second and third metatarsal head measuring up to 6 mm.      Impression:      Impression:  1.Osteomyelitis centered at the second proximal interphalangeal joint of the second toe with ulceration at the dorsal aspect of the proximal interphalangeal joint. The middle phalanx and majority of proximal phalanx have abnormal marrow signal intensity.  2.There is also an age-indeterminate fracture of the base of the proximal phalanx second digit with mild impaction. No drainable fluid collection to suggest abscess.  3.Severe arthritis of the second metatarsophalangeal joint. Moderate arthritis third metatarsophalangeal joint. Status post great toe amputation.  4.Muscular atrophy likely consistent with diabetes.  5.Nonspecific dorsal soft tissue edema.  6.Degenerative changes moderate to severe in degree of the hindfoot most pronounced at the talonavicular joint.        Electronically Signed: Dalia Ramirez MD    10/29/2024 8:39 AM EDT    Workstation ID: [id number]             Cardiology      Results Review:  I have reviewed all clinical data, test, lab, and imaging results.       Schedule Meds  budesonide-formoterol, 2 puff, Inhalation, BID - RT  buPROPion XL, 300 mg, Oral, Daily  ceFAZolin, 2,000 mg, Intravenous, Q8H  divalproex, 1,500 mg, Oral, Nightly  gabapentin, 300 mg, Oral, Q8H  heparin (porcine), 5,000 Units, Subcutaneous, Q8H  insulin glargine, 40 Units, Subcutaneous, Q12H  insulin lispro, 15 Units, Subcutaneous, TID With Meals  insulin lispro, 3-14 Units, Subcutaneous, 4x Daily AC & at Bedtime  levothyroxine, 200 mcg, Oral, Q AM  lisinopril, 20 mg, Oral, Q24H  pantoprazole, 40 mg, Oral, Q AM  sodium chloride, 10 mL, Intravenous, Q12H  ziprasidone, 60 mg, Oral, Nightly        Infusion Meds       PRN Meds    acetaminophen **OR** acetaminophen **OR** acetaminophen    albuterol    benzonatate    senna-docusate sodium **AND** polyethylene glycol **AND** bisacodyl **AND** bisacodyl    Calcium Replacement - Follow Nurse / BPA Driven Protocol    dextrose    dextrose    Magnesium Standard Dose Replacement - Follow Nurse / BPA Driven Protocol    nitroglycerin    Phosphorus Replacement - Follow Nurse / BPA Driven Protocol    Potassium Replacement - Follow Nurse / BPA Driven Protocol    [COMPLETED] Insert Peripheral IV **AND** sodium chloride    sodium chloride      Assessment & Plan       Assessment    Left diabetic foot with osteomyelitis involving the second digit proximal interphalangeal joint.  The patient has strong pulse    Remote history of right big toe amputation with healed site.    Type 2 diabetes-A1c is 8.06    Plan    Discontinue IV cefazolin  Start IV cefepime 2 g every 8 hours  Start IV vancomycin-asked pharmacy monitor dose  Wound culture of the left second toe  Waiting on podiatry's recommendations.  The patient will probably need left second toe amputation  Continue supportive care  A.m. labs  Patient needs to get her blood sugars under control to heal this  infection and avoid future infections    Debby Pina, APRN  10/29/24  17:33 EDT    Note is dictated utilizing voice recognition software/Dragon

## 2024-10-29 NOTE — PROGRESS NOTES
[facility] MEDICINE SERVICE  DAILY PROGRESS NOTE    NAME: Marilou VILCHIS Abbott  : 1970  MRN: [id number]      LOS: 3 days     PROVIDER OF SERVICE: Luis Alberto Mccoy MD    Chief Complaint: Cellulitis of left foot    Subjective:     Interval History:  History taken from: patient    Patient seen and examined at bedside this morning.  No acute complaints overnight.  Underwent MRI overnight        Review of Systems: Negative except as described above  Review of Systems    Objective:     Vital Signs  Temp:  [97.8 °F (36.6 °C)-98 °F (36.7 °C)] 98 °F (36.7 °C)  Heart Rate:  [69-91] 71  Resp:  [16-18] 18  BP: (139-146)/(72-80) 146/80   Body mass index is 45.19 kg/m².    Physical Exam  Physical Exam  Constitutional:       Comments: NAD    Cardiovascular:      Comments:  RRR, S1 & S2   Pulmonary:      Comments:  Lungs CTA   Abdominal:      Comments:  ABD soft, NT   Musculoskeletal:      Comments: Left foot wrapped in bandage            Diagnostic Data    Results from last 7 days   Lab Units 10/29/24  0525   WBC 10*3/mm3 7.12   HEMOGLOBIN g/dL 9.9*   HEMATOCRIT % 30.9*   PLATELETS 10*3/mm3 400   GLUCOSE mg/dL 224*   CREATININE mg/dL 0.76   BUN mg/dL 10   SODIUM mmol/L 134*   POTASSIUM mmol/L 5.1   AST (SGOT) U/L 34*   ALT (SGPT) U/L 31   ALK PHOS U/L 187*   BILIRUBIN mg/dL 0.4   ANION GAP mmol/L 9.7       MRI Foot Left With & Without Contrast    Result Date: 10/29/2024  Impression: 1.Osteomyelitis centered at the second proximal interphalangeal joint of the second toe with ulceration at the dorsal aspect of the proximal interphalangeal joint. The middle phalanx and majority of proximal phalanx have abnormal marrow signal intensity. 2.There is also an age-indeterminate fracture of the base of the proximal phalanx second digit with mild impaction. No drainable fluid collection to suggest abscess. 3.Severe arthritis of the second metatarsophalangeal joint. Moderate arthritis third metatarsophalangeal joint. Status  post great toe amputation. 4.Muscular atrophy likely consistent with diabetes. 5.Nonspecific dorsal soft tissue edema. 6.Degenerative changes moderate to severe in degree of the hindfoot most pronounced at the talonavicular joint. Electronically Signed: Dalia Ramirez MD  10/29/2024 8:39 AM EDT  Workstation ID: [id number]       I reviewed the patient's new clinical results.    Assessment/Plan:     Active and Resolved Problems  Active Hospital Problems    Diagnosis  POA    **Cellulitis of left foot [L03.116]  Yes      Resolved Hospital Problems   No resolved problems to display.       Left foot osteomyelitis  -Patient still has some erythema and edema surrounding the left second and third toe consistent with cellulitis  -MRI left foot ordered that showed osteomyelitis at the second proximal interphalange joint of second toe  -Continue IV antibiotics with Zosyn; vancomycin discontinued given negative MRSA swab  -Podiatry consulted  -Infectious disease consulted     DM type II, uncontrolled with hyperglycemia  -Increase Lantus to 40 Monty twice daily and prandial insulin to 15 units 3 times daily with meals as well as hide sliding scale  -A1c of 8 indicates fairly decent glycemic control prior to admission     Hypertension  -Continue lisinopril     Hypothyroidism  -Continue Synthroid     Morbid Obesity  -Counseled patient on diet and exercise to improve weight loss and comorbid conditions    VTE Prophylaxis:  Pharmacologic VTE prophylaxis orders are present.                  Time: 35 minutes     Code Status and Medical Interventions: CPR (Attempt to Resuscitate); Full Support   Ordered at: 10/26/24 1826     Code Status (Patient has no pulse and is not breathing):    CPR (Attempt to Resuscitate)     Medical Interventions (Patient has pulse or is breathing):    Full Support       Signature: Electronically signed by Luis Alberto Mccoy MD, 10/29/24, 11:55 EDT.  [facility] Hospitalist Team

## 2024-10-29 NOTE — PLAN OF CARE
Goal Outcome Evaluation:         Patient sleeping in between care. Patient using home CPAP while sleeping. Remains on IV abx. Patient stable at this time.

## 2024-10-29 NOTE — CASE MANAGEMENT/SOCIAL WORK
Continued Stay Note  SAGE Antunez     Patient Name: Marilou Abbott  MRN: [id number]  Today's Date: 10/29/2024    Admit Date: 10/26/2024    Plan: Routine home with spouse. Watch for IV abx.   Discharge Plan       Row Name 10/29/24 2638       Plan    Plan Comments MRI(+) for osteo. Awaiting further recommendations per Dr Gray.             Megan Naegele, RN     Office Phone: [phone]  Office Cell: [phone]

## 2024-10-29 NOTE — PLAN OF CARE
Goal Outcome Evaluation:   Patient remains on IV antibiotics, ACHS. Podiatry and ID on board. MRI left foot dhows osteomyelitis. Plant to amputate left toe tomorrow. NPO at midnight. Dressing changed on left foot twice today and wound cultures taken. Patient takes pills whole and ambulates independently.

## 2024-10-29 NOTE — PROGRESS NOTES
"Pharmacy Antimicrobial Dosing Service    Subjective:  Marilou Abbott is a 54 y.o.female admitted with possible bone/joint infection. Pharmacy has been consulted to dose Vancomycin.    PMH: Recurrent nonhealing wound to left second toe since June      Assessment/Plan    1. Day #1 Vancomycin: Goal -600 mcg*h/mL. Vancomycin 2000 mg X 1 dose (21.6 mg/kg AdjBW) followed with 1250 mg every 12 hours (13.5 mg/kg AdjBW) for predicted AUC= 541 mcg*h/ml. Peak ordered for 10/31 at 0000 and trough 10/31 at 0700 prior to 4th total dose.     2. Day #1 Cefepime: 2000 mg IV q8h for estCrCl > 60 mL/min.    Will continue to monitor drug levels, renal function, culture and sensitivities, and patient clinical status.       Objective:  Relevant clinical data and objective history reviewed:  172.7 cm (68\")   135 kg (297 lb 3.2 oz)   Ideal body weight: 63.9 kg (140 lb 14 oz)  Adjusted ideal body weight: 92.3 kg (203 lb 6.5 oz)  Body mass index is 45.19 kg/m².        Results from last 7 days   Lab Units 10/29/24  0525 10/28/24  0350 10/27/24  0953   CREATININE mg/dL 0.76 0.81 0.87     Estimated Creatinine Clearance: 123.3 mL/min (by C-G formula based on SCr of 0.76 mg/dL).  I/O last 3 completed shifts:  In: 1711.6 [P.O.:960; I.V.:751.6]  Out: -     Results from last 7 days   Lab Units 10/29/24  0525 10/28/24  0350 10/27/24  0607   WBC 10*3/mm3 7.12 5.42 5.48     Temperature    10/28/24 1651 10/28/24 2043 10/29/24 0358   Temp: 97.9 °F (36.6 °C) 97.8 °F (36.6 °C) 98 °F (36.7 °C)     Baseline culture/source/susceptibility:  Microbiology Results (last 10 days)       Procedure Component Value - Date/Time    Respiratory Panel PCR w/COVID-19(SARS-CoV-2) SHAYNE/JAMESON/ANDREW/PAD/COR/DARRYL In-House, NP Swab in UTM/VTM, 2 HR TAT - Swab, Nasopharynx [[id number]]  (Normal) Collected: 10/26/24 7657    Lab Status: Final result Specimen: Swab from Nasopharynx Updated: 10/26/24 0950     ADENOVIRUS, PCR Not Detected     Coronavirus 229E Not Detected     " Coronavirus HKU1 Not Detected     Coronavirus NL63 Not Detected     Coronavirus OC43 Not Detected     COVID19 Not Detected     Human Metapneumovirus Not Detected     Human Rhinovirus/Enterovirus Not Detected     Influenza A PCR Not Detected     Influenza B PCR Not Detected     Parainfluenza Virus 1 Not Detected     Parainfluenza Virus 2 Not Detected     Parainfluenza Virus 3 Not Detected     Parainfluenza Virus 4 Not Detected     RSV, PCR Not Detected     Bordetella pertussis pcr Not Detected     Bordetella parapertussis PCR Not Detected     Chlamydophila pneumoniae PCR Not Detected     Mycoplasma pneumo by PCR Not Detected    Narrative:      In the setting of a positive respiratory panel with a viral infection PLUS a negative procalcitonin without other underlying concern for bacterial infection, consider observing off antibiotics or discontinuation of antibiotics and continue supportive care. If the respiratory panel is positive for atypical bacterial infection (Bordetella pertussis, Chlamydophila pneumoniae, or Mycoplasma pneumoniae), consider antibiotic de-escalation to target atypical bacterial infection.    Blood Culture - Blood, Arm, Right [[id number]]  (Normal) Collected: 10/26/24 1652    Lab Status: Preliminary result Specimen: Blood from Arm, Right Updated: 10/29/24 1701     Blood Culture No growth at 3 days    Blood Culture - Blood, Arm, Left [[id number]]  (Normal) Collected: 10/26/24 1609    Lab Status: Preliminary result Specimen: Blood from Arm, Left Updated: 10/29/24 1630     Blood Culture No growth at 3 days            Azra Yeung, PharmD  10/29/24 18:17 EDT

## 2024-10-29 NOTE — NURSING NOTE
54-year-old female who presents to the hospital with an ulceration to her toe.  Chart reviewed.  Patient is pending an MRI.  Patient is being followed by podiatry services will defer to podiatry and follow and assist them as needed

## 2024-10-29 NOTE — PROGRESS NOTES
10/29/24   Foot and Ankle Surgery - Inpatient Follow-up  Provider: Dr. Kan Gray DPM  Location: [facility]  Chief Complaint: Left second toe ulcer    Subjective:  Marilou Abbott is a 54 y.o. female sitting in her chair states she feels better. Has received the results of her MRI.  Awaiting next step she states        Allergies   Allergen Reactions    Dust Mite Extract Unknown - Low Severity    Molds & Smuts Unknown - Low Severity    Bactrim [Sulfamethoxazole-Trimethoprim] Rash     blisters    Trimethoprim Rash       No current facility-administered medications on file prior to encounter.     Current Outpatient Medications on File Prior to Encounter   Medication Sig Dispense Refill    albuterol sulfate  (90 Base) MCG/ACT inhaler Inhale 2 puffs Every 4 (Four) Hours As Needed for Wheezing or Shortness of Air. 18 g 0    amoxicillin (AMOXIL) 875 MG tablet Take 1 tablet by mouth 2 (Two) Times a Day for 10 days. 20 tablet 0    buPROPion XL (WELLBUTRIN XL) 300 MG 24 hr tablet Take 1 tablet by mouth Every Morning.      dicyclomine (Bentyl) 10 MG capsule Take 1 capsule by mouth 4 (Four) Times a Day As Needed.      divalproex (DEPAKOTE ER) 500 MG 24 hr tablet Take 3 tablets by mouth Every Night.      gabapentin (NEURONTIN) 300 MG capsule Take 1 capsule by mouth 3 (Three) Times a Day. 90 capsule 5    insulin aspart (NovoLOG) 100 UNIT/ML patient supplied pump Inject 30-50 Units under the skin into the appropriate area as directed 3 (Three) Times a Day Before Meals.      Insulin Glargine (LANTUS SOLOSTAR) 100 UNIT/ML injection pen Inject 80 Units under the skin into the appropriate area as directed Daily.      levothyroxine (SYNTHROID, LEVOTHROID) 200 MCG tablet Take 1 tablet by mouth Daily.      lisinopril (PRINIVIL,ZESTRIL) 20 MG tablet Take 1 tablet by mouth Daily.      omeprazole (priLOSEC) 40 MG capsule Take 1 capsule by mouth 2 (Two) Times a Day.      Ozempic, 0.25 or 0.5 MG/DOSE, 2 MG/3ML solution  "pen-injector Inject 0.25 mg under the skin into the appropriate area as directed. Wed      ziprasidone (GEODON) 60 MG capsule Take 1 capsule by mouth Every Evening.      glucagon (GLUCAGEN HYPOKIT) 1 MG injection GLUCAGEN HYPOKIT 1 MG SOLR         Objective   /80 (BP Location: Left arm, Patient Position: Lying)   Pulse 71   Temp 98 °F (36.7 °C) (Oral)   Resp 18   Ht 172.7 cm (68\")   Wt 135 kg (297 lb 3.2 oz)   SpO2 (!) 0%   BMI 45.19 kg/m²     General: Alert and oriented by 3  Vascular: Pulses easily palpable left foot  Neuro: Light touch and protective sensations absent left foot  MSK: Hammertoe second left foot with ulceration PIPJ  Derm: Decreased erythema and edema with ulceration of the proximal interphalangeal joint left foot.      Results from last 7 days   Lab Units 10/29/24  0525   WBC 10*3/mm3 7.12   HEMOGLOBIN g/dL 9.9*   HEMATOCRIT % 30.9*   PLATELETS 10*3/mm3 400     ncounter Date    10/26/24    MRI Foot Left With & Without Contrast [[id number]] (Order [id number])  Order  Status: Final result     Patient Location    Patient Class Location   Inpatient [facility] IP, 363, 1     [phone]     Appointment Information    PACS Images     Radiology Images  Study Result    Narrative & Impression   MRI FOOT LEFT W WO CONTRAST     Date of Exam: 10/29/2024 12:57 AM EDT     Indication: Possible osteomyelitis; erythema and edema of 2nd & 3rd digits. History of amputation great toe.     Comparison: Foot radiograph 10/26/2024, left foot MRI 1/30/2015     Technique:  Routine multiplanar/multisequence sequence images of the left foot were obtained before and after the uneventful administration of Prohance.          Findings:  There is osseous destruction and abnormal marrow signal intensity centered at the second proximal interphalangeal joint. The proximal phalanx has homogeneous low T1 high T2 signal intensity involving the distal 2 cm. There is loss of normal cortex. There   is flexion of proximal to " phalangeal joint. The middle phalanx has abnormal low T1 high T2 signal intensity involving its entirety. Ulceration centered at the proximal interphalangeal joint. There is a probable remote fracture of the base of the   proximal phalanx. There is no drainable fluid collection to suggest abscess. There is amputation of the great toe. There is flexion at the third fourth and fifth proximal to phalangeal joint. There is severe arthritis of the first metatarsal phalangeal   joint and second metatarsal phalangeal joint. There is moderate joint space narrowing of the third metatarsophalangeal joint. There is medial subluxation proximal phalanx of the third digit. There is severe subtalar joint space narrowing. Prominent   dorsal osteophytes. Moderate subtalar joint space narrowing. Mild navicular cuneiform joint space narrowing.     There is atrophy intrinsic muscles of the foot. The flexor extensor tendons are intact. There is nonspecific dorsal soft tissue edema of the foot. Small intermetatarsal bursal collection between the first and second metatarsal head measuring up to 9 mm   and small intermetatarsal bursal collection between the second and third metatarsal head measuring up to 6 mm.     IMPRESSION:  Impression:  1.Osteomyelitis centered at the second proximal interphalangeal joint of the second toe with ulceration at the dorsal aspect of the proximal interphalangeal joint. The middle phalanx and majority of proximal phalanx have abnormal marrow signal intensity.  2.There is also an age-indeterminate fracture of the base of the proximal phalanx second digit with mild impaction. No drainable fluid collection to suggest abscess.  3.Severe arthritis of the second metatarsophalangeal joint. Moderate arthritis third metatarsophalangeal joint. Status post great toe amputation.  4.Muscular atrophy likely consistent with diabetes.  5.Nonspecific dorsal soft tissue edema.  6.Degenerative changes moderate to severe in  degree of the hindfoot most pronounced at the talonavicular joint.           Electronically Signed: Jerrell Rivera MD    10/29/2024 8:39 AM EDT    Workstation ID: [id number]     Imaging    MRI Foot Left With & Without Contrast (Order: [id number]) - 10/28/2024    Result History    MRI Foot Left With & Without Contrast (Order #[id number]) on 10/29/2024 - Order Result History Report    Signed    Electronically signed by Jerrell Rivera MD on 10/29/24 at 0839 EDT     Reprint Order Requisition    MRI Foot Left With & Without Contrast (Order #[id number]) on 10/28/24       Provider Comment To Patient     10/29/2024  9:41 AM  Not seen       MRI Foot Left With & Without Contrast: Patient Communication     10/29/2024  9:41 AM  Not seen     Signed by    Signed Date/Time  Phone Pager   JERRELL RIVERA 10/29/2024  8:39 AM [phone]  8:39 [phone]      Assessment & Plan   Osteomyelitis second toe left foot  Previous great toe amputation left foot  Non-insulin-dependent diabetes with neuropathy  Hammertoe second with ulceration proximal interphalangeal joint left foot    We discussed patient's MRI in detail..  We explained to the patient that the best course of treatment for her will be a second toe amputation.  We gave her the risk versus the benefits which include but not limited to bleeding infection nerve damage loss of limb loss of life.  We told her losing 2 toes is feasible but 3 or more than she should have all the toes removed or TMA.  Knowing the patient at this point I think she will do best with a second toe amputation and if any problems arise with digits 3 through 5 we will try to do tendon rebalancing to prevent those from becoming problems down the road.  We will hold any anticoagulants n.p.o. after midnight get her consented.  She is scheduled for 1330 tomorrow for second toe amputation left.  Any problems please call Dr. Gray [phone]    Note is dictated utilizing voice recognition software. Unfortunately this  leads to occasional typographical errors. I apologize in advance if the situation occurs. If questions occur please do not hesitate to call our office.

## 2024-10-30 ENCOUNTER — ANESTHESIA (OUTPATIENT)
Dept: PERIOP | Facility: HOSPITAL | Age: 54
End: 2024-10-30
Payer: OTHER GOVERNMENT

## 2024-10-30 ENCOUNTER — ANESTHESIA EVENT (OUTPATIENT)
Dept: PERIOP | Facility: HOSPITAL | Age: 54
End: 2024-10-30
Payer: OTHER GOVERNMENT

## 2024-10-30 ENCOUNTER — APPOINTMENT (OUTPATIENT)
Dept: GENERAL RADIOLOGY | Facility: HOSPITAL | Age: 54
End: 2024-10-30
Payer: OTHER GOVERNMENT

## 2024-10-30 LAB
GLUCOSE BLDC GLUCOMTR-MCNC: 134 MG/DL (ref 70–105)
GLUCOSE BLDC GLUCOMTR-MCNC: 154 MG/DL (ref 70–105)
GLUCOSE BLDC GLUCOMTR-MCNC: 155 MG/DL (ref 70–105)
GLUCOSE BLDC GLUCOMTR-MCNC: 180 MG/DL (ref 70–105)
GLUCOSE BLDC GLUCOMTR-MCNC: 328 MG/DL (ref 70–105)

## 2024-10-30 PROCEDURE — 25810000003 LACTATED RINGERS PER 1000 ML: Performed by: NURSE ANESTHETIST, CERTIFIED REGISTERED

## 2024-10-30 PROCEDURE — 87205 SMEAR GRAM STAIN: CPT | Performed by: PODIATRIST

## 2024-10-30 PROCEDURE — 25010000002 DEXAMETHASONE PER 1 MG: Performed by: ANESTHESIOLOGIST ASSISTANT

## 2024-10-30 PROCEDURE — 87075 CULTR BACTERIA EXCEPT BLOOD: CPT | Performed by: PODIATRIST

## 2024-10-30 PROCEDURE — 25810000003 SODIUM CHLORIDE 0.9 % SOLUTION 250 ML FLEX CONT: Performed by: PODIATRIST

## 2024-10-30 PROCEDURE — 63710000001 INSULIN LISPRO (HUMAN) PER 5 UNITS: Performed by: PODIATRIST

## 2024-10-30 PROCEDURE — 25810000003 LACTATED RINGERS PER 1000 ML: Performed by: PODIATRIST

## 2024-10-30 PROCEDURE — 94799 UNLISTED PULMONARY SVC/PX: CPT

## 2024-10-30 PROCEDURE — 0Y6S0Z0 DETACHMENT AT LEFT 2ND TOE, COMPLETE, OPEN APPROACH: ICD-10-PCS | Performed by: PODIATRIST

## 2024-10-30 PROCEDURE — 25010000002 FENTANYL CITRATE (PF) 50 MCG/ML SOLUTION: Performed by: NURSE ANESTHETIST, CERTIFIED REGISTERED

## 2024-10-30 PROCEDURE — 87206 SMEAR FLUORESCENT/ACID STAI: CPT | Performed by: PODIATRIST

## 2024-10-30 PROCEDURE — 25010000002 PROPOFOL 200 MG/20ML EMULSION: Performed by: NURSE ANESTHETIST, CERTIFIED REGISTERED

## 2024-10-30 PROCEDURE — 25010000002 VANCOMYCIN HCL 1.25 G RECONSTITUTED SOLUTION 1 EACH VIAL: Performed by: PODIATRIST

## 2024-10-30 PROCEDURE — 87070 CULTURE OTHR SPECIMN AEROBIC: CPT | Performed by: PODIATRIST

## 2024-10-30 PROCEDURE — 88305 TISSUE EXAM BY PATHOLOGIST: CPT | Performed by: PODIATRIST

## 2024-10-30 PROCEDURE — 25810000003 SODIUM CHLORIDE 0.9 % SOLUTION 250 ML FLEX CONT: Performed by: NURSE PRACTITIONER

## 2024-10-30 PROCEDURE — 82948 REAGENT STRIP/BLOOD GLUCOSE: CPT | Performed by: ANESTHESIOLOGIST ASSISTANT

## 2024-10-30 PROCEDURE — 82948 REAGENT STRIP/BLOOD GLUCOSE: CPT

## 2024-10-30 PROCEDURE — 25010000002 CEFEPIME PER 500 MG: Performed by: PODIATRIST

## 2024-10-30 PROCEDURE — 25010000002 VANCOMYCIN HCL 1.25 G RECONSTITUTED SOLUTION 1 EACH VIAL: Performed by: NURSE PRACTITIONER

## 2024-10-30 PROCEDURE — 25010000002 BUPIVACAINE (PF) 0.25 % SOLUTION 10 ML VIAL: Performed by: PODIATRIST

## 2024-10-30 PROCEDURE — 94664 DEMO&/EVAL PT USE INHALER: CPT

## 2024-10-30 PROCEDURE — 94761 N-INVAS EAR/PLS OXIMETRY MLT: CPT

## 2024-10-30 PROCEDURE — 87102 FUNGUS ISOLATION CULTURE: CPT | Performed by: PODIATRIST

## 2024-10-30 PROCEDURE — 25010000002 CEFEPIME PER 500 MG: Performed by: NURSE PRACTITIONER

## 2024-10-30 PROCEDURE — 73630 X-RAY EXAM OF FOOT: CPT

## 2024-10-30 PROCEDURE — 25010000002 LIDOCAINE PF 1% 1 % SOLUTION: Performed by: NURSE ANESTHETIST, CERTIFIED REGISTERED

## 2024-10-30 PROCEDURE — 25010000002 LIDOCAINE 1 % SOLUTION 20 ML VIAL: Performed by: PODIATRIST

## 2024-10-30 PROCEDURE — 87116 MYCOBACTERIA CULTURE: CPT | Performed by: PODIATRIST

## 2024-10-30 PROCEDURE — 88311 DECALCIFY TISSUE: CPT | Performed by: PODIATRIST

## 2024-10-30 PROCEDURE — 87176 TISSUE HOMOGENIZATION CULTR: CPT | Performed by: PODIATRIST

## 2024-10-30 PROCEDURE — 25010000002 ONDANSETRON PER 1 MG: Performed by: ANESTHESIOLOGIST ASSISTANT

## 2024-10-30 PROCEDURE — 63710000001 INSULIN GLARGINE PER 5 UNITS: Performed by: PODIATRIST

## 2024-10-30 PROCEDURE — 25010000002 BUPIVACAINE (PF) 0.25 % SOLUTION: Performed by: PODIATRIST

## 2024-10-30 RX ORDER — METOCLOPRAMIDE HYDROCHLORIDE 5 MG/ML
10 INJECTION INTRAMUSCULAR; INTRAVENOUS ONCE AS NEEDED
Status: DISCONTINUED | OUTPATIENT
Start: 2024-10-30 | End: 2024-10-30 | Stop reason: HOSPADM

## 2024-10-30 RX ORDER — PROPOFOL 10 MG/ML
INJECTION, EMULSION INTRAVENOUS AS NEEDED
Status: DISCONTINUED | OUTPATIENT
Start: 2024-10-30 | End: 2024-10-30 | Stop reason: SURG

## 2024-10-30 RX ORDER — ONDANSETRON 2 MG/ML
4 INJECTION INTRAMUSCULAR; INTRAVENOUS ONCE AS NEEDED
Status: DISCONTINUED | OUTPATIENT
Start: 2024-10-30 | End: 2024-10-30 | Stop reason: HOSPADM

## 2024-10-30 RX ORDER — FLUMAZENIL 0.1 MG/ML
0.5 INJECTION INTRAVENOUS AS NEEDED
Status: DISCONTINUED | OUTPATIENT
Start: 2024-10-30 | End: 2024-10-30 | Stop reason: HOSPADM

## 2024-10-30 RX ORDER — BUPIVACAINE HYDROCHLORIDE 2.5 MG/ML
INJECTION, SOLUTION EPIDURAL; INFILTRATION; INTRACAUDAL AS NEEDED
Status: DISCONTINUED | OUTPATIENT
Start: 2024-10-30 | End: 2024-10-30 | Stop reason: HOSPADM

## 2024-10-30 RX ORDER — HYDRALAZINE HYDROCHLORIDE 20 MG/ML
5 INJECTION INTRAMUSCULAR; INTRAVENOUS
Status: DISCONTINUED | OUTPATIENT
Start: 2024-10-30 | End: 2024-10-30 | Stop reason: HOSPADM

## 2024-10-30 RX ORDER — SODIUM CHLORIDE, SODIUM LACTATE, POTASSIUM CHLORIDE, CALCIUM CHLORIDE 600; 310; 30; 20 MG/100ML; MG/100ML; MG/100ML; MG/100ML
INJECTION, SOLUTION INTRAVENOUS CONTINUOUS PRN
Status: DISCONTINUED | OUTPATIENT
Start: 2024-10-30 | End: 2024-10-30 | Stop reason: SURG

## 2024-10-30 RX ORDER — DIPHENHYDRAMINE HCL 25 MG
25 CAPSULE ORAL
Status: DISCONTINUED | OUTPATIENT
Start: 2024-10-30 | End: 2024-10-30 | Stop reason: HOSPADM

## 2024-10-30 RX ORDER — OXYCODONE HYDROCHLORIDE 5 MG/1
10 TABLET ORAL ONCE AS NEEDED
Status: DISCONTINUED | OUTPATIENT
Start: 2024-10-30 | End: 2024-10-30 | Stop reason: HOSPADM

## 2024-10-30 RX ORDER — FENTANYL CITRATE 50 UG/ML
100 INJECTION, SOLUTION INTRAMUSCULAR; INTRAVENOUS
Status: DISCONTINUED | OUTPATIENT
Start: 2024-10-30 | End: 2024-10-30 | Stop reason: HOSPADM

## 2024-10-30 RX ORDER — LIDOCAINE HYDROCHLORIDE 10 MG/ML
0.5 INJECTION, SOLUTION EPIDURAL; INFILTRATION; INTRACAUDAL; PERINEURAL ONCE AS NEEDED
Status: DISCONTINUED | OUTPATIENT
Start: 2024-10-30 | End: 2024-10-30 | Stop reason: HOSPADM

## 2024-10-30 RX ORDER — SODIUM CHLORIDE, SODIUM LACTATE, POTASSIUM CHLORIDE, CALCIUM CHLORIDE 600; 310; 30; 20 MG/100ML; MG/100ML; MG/100ML; MG/100ML
20 INJECTION, SOLUTION INTRAVENOUS ONCE
Status: COMPLETED | OUTPATIENT
Start: 2024-10-30 | End: 2024-10-30

## 2024-10-30 RX ORDER — LIDOCAINE HYDROCHLORIDE 10 MG/ML
INJECTION, SOLUTION EPIDURAL; INFILTRATION; INTRACAUDAL; PERINEURAL AS NEEDED
Status: DISCONTINUED | OUTPATIENT
Start: 2024-10-30 | End: 2024-10-30 | Stop reason: SURG

## 2024-10-30 RX ORDER — PROCHLORPERAZINE EDISYLATE 5 MG/ML
10 INJECTION INTRAMUSCULAR; INTRAVENOUS ONCE AS NEEDED
Status: DISCONTINUED | OUTPATIENT
Start: 2024-10-30 | End: 2024-10-30 | Stop reason: HOSPADM

## 2024-10-30 RX ORDER — EPHEDRINE SULFATE 5 MG/ML
5 INJECTION INTRAVENOUS ONCE AS NEEDED
Status: DISCONTINUED | OUTPATIENT
Start: 2024-10-30 | End: 2024-10-30 | Stop reason: HOSPADM

## 2024-10-30 RX ORDER — NALOXONE HCL 0.4 MG/ML
0.4 VIAL (ML) INJECTION AS NEEDED
Status: DISCONTINUED | OUTPATIENT
Start: 2024-10-30 | End: 2024-10-30 | Stop reason: HOSPADM

## 2024-10-30 RX ORDER — LABETALOL HYDROCHLORIDE 5 MG/ML
5 INJECTION, SOLUTION INTRAVENOUS
Status: DISCONTINUED | OUTPATIENT
Start: 2024-10-30 | End: 2024-10-30 | Stop reason: HOSPADM

## 2024-10-30 RX ORDER — DIPHENHYDRAMINE HYDROCHLORIDE 50 MG/ML
12.5 INJECTION INTRAMUSCULAR; INTRAVENOUS
Status: DISCONTINUED | OUTPATIENT
Start: 2024-10-30 | End: 2024-10-30 | Stop reason: HOSPADM

## 2024-10-30 RX ORDER — MIDAZOLAM HYDROCHLORIDE 1 MG/ML
1 INJECTION, SOLUTION INTRAMUSCULAR; INTRAVENOUS
Status: DISCONTINUED | OUTPATIENT
Start: 2024-10-30 | End: 2024-10-30 | Stop reason: HOSPADM

## 2024-10-30 RX ORDER — DIPHENHYDRAMINE HYDROCHLORIDE 50 MG/ML
12.5 INJECTION INTRAMUSCULAR; INTRAVENOUS ONCE AS NEEDED
Status: DISCONTINUED | OUTPATIENT
Start: 2024-10-30 | End: 2024-10-30 | Stop reason: HOSPADM

## 2024-10-30 RX ORDER — ACETAMINOPHEN 650 MG/1
650 SUPPOSITORY RECTAL EVERY 4 HOURS PRN
Status: DISCONTINUED | OUTPATIENT
Start: 2024-10-30 | End: 2024-11-01 | Stop reason: HOSPADM

## 2024-10-30 RX ORDER — HEPARIN SODIUM 5000 [USP'U]/ML
5000 INJECTION, SOLUTION INTRAVENOUS; SUBCUTANEOUS EVERY 8 HOURS SCHEDULED
Status: DISCONTINUED | OUTPATIENT
Start: 2024-10-31 | End: 2024-11-01 | Stop reason: HOSPADM

## 2024-10-30 RX ORDER — IPRATROPIUM BROMIDE AND ALBUTEROL SULFATE 2.5; .5 MG/3ML; MG/3ML
3 SOLUTION RESPIRATORY (INHALATION) ONCE AS NEEDED
Status: COMPLETED | OUTPATIENT
Start: 2024-10-30 | End: 2024-10-30

## 2024-10-30 RX ORDER — ACETAMINOPHEN 325 MG/1
650 TABLET ORAL ONCE AS NEEDED
Status: DISCONTINUED | OUTPATIENT
Start: 2024-10-30 | End: 2024-11-01 | Stop reason: HOSPADM

## 2024-10-30 RX ORDER — DEXAMETHASONE SODIUM PHOSPHATE 4 MG/ML
INJECTION, SOLUTION INTRA-ARTICULAR; INTRALESIONAL; INTRAMUSCULAR; INTRAVENOUS; SOFT TISSUE AS NEEDED
Status: DISCONTINUED | OUTPATIENT
Start: 2024-10-30 | End: 2024-10-30 | Stop reason: SURG

## 2024-10-30 RX ORDER — PROMETHAZINE HYDROCHLORIDE 25 MG/1
25 SUPPOSITORY RECTAL ONCE AS NEEDED
Status: DISCONTINUED | OUTPATIENT
Start: 2024-10-30 | End: 2024-10-30 | Stop reason: HOSPADM

## 2024-10-30 RX ORDER — SODIUM CHLORIDE 0.9 % (FLUSH) 0.9 %
10 SYRINGE (ML) INJECTION EVERY 12 HOURS SCHEDULED
Status: DISCONTINUED | OUTPATIENT
Start: 2024-10-30 | End: 2024-10-30 | Stop reason: HOSPADM

## 2024-10-30 RX ORDER — DEXAMETHASONE SODIUM PHOSPHATE 4 MG/ML
8 INJECTION, SOLUTION INTRA-ARTICULAR; INTRALESIONAL; INTRAMUSCULAR; INTRAVENOUS; SOFT TISSUE ONCE AS NEEDED
Status: DISCONTINUED | OUTPATIENT
Start: 2024-10-30 | End: 2024-10-30 | Stop reason: HOSPADM

## 2024-10-30 RX ORDER — PROMETHAZINE HYDROCHLORIDE 25 MG/1
25 TABLET ORAL ONCE AS NEEDED
Status: DISCONTINUED | OUTPATIENT
Start: 2024-10-30 | End: 2024-10-30 | Stop reason: HOSPADM

## 2024-10-30 RX ORDER — SODIUM CHLORIDE 0.9 % (FLUSH) 0.9 %
10 SYRINGE (ML) INJECTION AS NEEDED
Status: DISCONTINUED | OUTPATIENT
Start: 2024-10-30 | End: 2024-10-30 | Stop reason: HOSPADM

## 2024-10-30 RX ORDER — FENTANYL CITRATE 50 UG/ML
INJECTION, SOLUTION INTRAMUSCULAR; INTRAVENOUS AS NEEDED
Status: DISCONTINUED | OUTPATIENT
Start: 2024-10-30 | End: 2024-10-30 | Stop reason: SURG

## 2024-10-30 RX ORDER — ONDANSETRON 2 MG/ML
INJECTION INTRAMUSCULAR; INTRAVENOUS AS NEEDED
Status: DISCONTINUED | OUTPATIENT
Start: 2024-10-30 | End: 2024-10-30 | Stop reason: SURG

## 2024-10-30 RX ORDER — MEPERIDINE HYDROCHLORIDE 25 MG/ML
12.5 INJECTION INTRAMUSCULAR; INTRAVENOUS; SUBCUTANEOUS
Status: DISCONTINUED | OUTPATIENT
Start: 2024-10-30 | End: 2024-10-30 | Stop reason: HOSPADM

## 2024-10-30 RX ADMIN — INSULIN LISPRO 3 UNITS: 100 INJECTION, SOLUTION INTRAVENOUS; SUBCUTANEOUS at 17:34

## 2024-10-30 RX ADMIN — INSULIN LISPRO 10 UNITS: 100 INJECTION, SOLUTION INTRAVENOUS; SUBCUTANEOUS at 20:01

## 2024-10-30 RX ADMIN — CEFEPIME 2000 MG: 2 INJECTION, POWDER, FOR SOLUTION INTRAVENOUS at 02:24

## 2024-10-30 RX ADMIN — SODIUM CHLORIDE, SODIUM LACTATE, POTASSIUM CHLORIDE, AND CALCIUM CHLORIDE: .6; .31; .03; .02 INJECTION, SOLUTION INTRAVENOUS at 13:44

## 2024-10-30 RX ADMIN — SODIUM CHLORIDE, SODIUM LACTATE, POTASSIUM CHLORIDE, AND CALCIUM CHLORIDE 20 ML/HR: .6; .31; .03; .02 INJECTION, SOLUTION INTRAVENOUS at 13:31

## 2024-10-30 RX ADMIN — LIDOCAINE HYDROCHLORIDE 50 MG: 10 INJECTION, SOLUTION EPIDURAL; INFILTRATION; INTRACAUDAL; PERINEURAL at 13:44

## 2024-10-30 RX ADMIN — Medication 10 ML: at 09:25

## 2024-10-30 RX ADMIN — DIVALPROEX SODIUM 1500 MG: 500 TABLET, EXTENDED RELEASE ORAL at 20:04

## 2024-10-30 RX ADMIN — ACETAMINOPHEN 1000 MG: 500 TABLET, FILM COATED ORAL at 17:34

## 2024-10-30 RX ADMIN — DEXAMETHASONE SODIUM PHOSPHATE 8 MG: 4 INJECTION, SOLUTION INTRAMUSCULAR; INTRAVENOUS at 14:46

## 2024-10-30 RX ADMIN — CEFEPIME 2000 MG: 2 INJECTION, POWDER, FOR SOLUTION INTRAVENOUS at 17:34

## 2024-10-30 RX ADMIN — BUDESONIDE AND FORMOTEROL FUMARATE DIHYDRATE 2 PUFF: 160; 4.5 AEROSOL RESPIRATORY (INHALATION) at 18:25

## 2024-10-30 RX ADMIN — ONDANSETRON 4 MG: 2 INJECTION INTRAMUSCULAR; INTRAVENOUS at 14:46

## 2024-10-30 RX ADMIN — SODIUM CHLORIDE 1250 MG: 9 INJECTION, SOLUTION INTRAVENOUS at 20:12

## 2024-10-30 RX ADMIN — ZIPRASIDONE HYDROCHLORIDE 60 MG: 20 CAPSULE ORAL at 20:03

## 2024-10-30 RX ADMIN — INSULIN GLARGINE 40 UNITS: 100 INJECTION, SOLUTION SUBCUTANEOUS at 20:02

## 2024-10-30 RX ADMIN — FENTANYL CITRATE 100 MCG: 50 INJECTION, SOLUTION INTRAMUSCULAR; INTRAVENOUS at 13:44

## 2024-10-30 RX ADMIN — INSULIN LISPRO 15 UNITS: 100 INJECTION, SOLUTION INTRAVENOUS; SUBCUTANEOUS at 17:35

## 2024-10-30 RX ADMIN — IPRATROPIUM BROMIDE AND ALBUTEROL SULFATE 3 ML: .5; 3 SOLUTION RESPIRATORY (INHALATION) at 15:05

## 2024-10-30 RX ADMIN — GABAPENTIN 300 MG: 300 CAPSULE ORAL at 20:11

## 2024-10-30 RX ADMIN — BUDESONIDE AND FORMOTEROL FUMARATE DIHYDRATE 2 PUFF: 160; 4.5 AEROSOL RESPIRATORY (INHALATION) at 09:30

## 2024-10-30 RX ADMIN — SODIUM CHLORIDE 1250 MG: 9 INJECTION, SOLUTION INTRAVENOUS at 09:19

## 2024-10-30 RX ADMIN — PROPOFOL 200 MG: 10 INJECTION, EMULSION INTRAVENOUS at 13:44

## 2024-10-30 RX ADMIN — Medication 10 ML: at 20:11

## 2024-10-30 NOTE — PLAN OF CARE
Problem: Adult Inpatient Plan of Care  Goal: Plan of Care Review  Outcome: Progressing  Goal: Patient-Specific Goal (Individualized)  Outcome: Progressing  Goal: Absence of Hospital-Acquired Illness or Injury  Outcome: Progressing  Intervention: Identify and Manage Fall Risk  Recent Flowsheet Documentation  Taken 10/30/2024 1600 by Mercy Ware RN  Safety Promotion/Fall Prevention: safety round/check completed  Taken 10/30/2024 1000 by Mercy Ware RN  Safety Promotion/Fall Prevention: safety round/check completed  Taken 10/30/2024 0845 by Mercy Ware RN  Safety Promotion/Fall Prevention: safety round/check completed  Goal: Optimal Comfort and Wellbeing  Outcome: Progressing  Goal: Readiness for Transition of Care  Outcome: Progressing     Problem: Comorbidity Management  Goal: Maintenance of Asthma Control  Outcome: Progressing  Goal: Blood Glucose Level Within Target Range  Outcome: Progressing  Goal: Blood Pressure in Desired Range  Outcome: Progressing  Goal: Maintenance of Osteoarthritis Symptom Control  Outcome: Progressing     Problem: Pain Acute  Goal: Optimal Pain Control and Function  Outcome: Progressing     Problem: Fall Injury Risk  Goal: Absence of Fall and Fall-Related Injury  Outcome: Progressing  Intervention: Promote Injury-Free Environment  Recent Flowsheet Documentation  Taken 10/30/2024 1600 by Mercy Ware RN  Safety Promotion/Fall Prevention: safety round/check completed  Taken 10/30/2024 1000 by Mercy Ware RN  Safety Promotion/Fall Prevention: safety round/check completed  Taken 10/30/2024 0845 by Mercy Ware RN  Safety Promotion/Fall Prevention: safety round/check completed     Problem: Skin or Soft Tissue Infection  Goal: Absence of Infection Signs and Symptoms  Outcome: Progressing     Problem: Noninvasive Ventilation Acute  Goal: Effective Unassisted Ventilation and Oxygenation  Outcome: Progressing   Goal Outcome Evaluation:

## 2024-10-30 NOTE — PROGRESS NOTES
[facility] MEDICINE SERVICE  DAILY PROGRESS NOTE    NAME: Marilou VILCHIS Abbott  : 1970  MRN: [id number]      LOS: 4 days     PROVIDER OF SERVICE: Luis Alberto Mccoy MD    Chief Complaint: Cellulitis of left foot    Subjective:     Interval History:  History taken from: patient    Patient seen and examined at bedside this morning.  She is scheduled for her second left toe amputation with podiatry today.  No acute complaints overnight        Review of Systems: Negative except as described above  Review of Systems    Objective:     Vital Signs  Temp:  [97.4 °F (36.3 °C)-98.1 °F (36.7 °C)] 97.4 °F (36.3 °C)  Heart Rate:  [60-88] 74  Resp:  [16-19] 19  BP: (122-153)/(65-76) 122/71   Body mass index is 45.19 kg/m².    Physical Exam Physical Exam  Constitutional:       Comments: NAD    Cardiovascular:      Comments:  RRR, S1 & S2   Pulmonary:      Comments:  Lungs CTA   Abdominal:      Comments:  ABD soft, NT   Musculoskeletal:      Comments: Left foot wrapped in bandage   Physical Exam       Diagnostic Data    Results from last 7 days   Lab Units 10/29/24  2220   WBC 10*3/mm3 7.42   HEMOGLOBIN g/dL 10.9*   HEMATOCRIT % 34.8   PLATELETS 10*3/mm3 456*   GLUCOSE mg/dL 200*   CREATININE mg/dL 0.92   BUN mg/dL 11   SODIUM mmol/L 133*   POTASSIUM mmol/L 4.8   AST (SGOT) U/L 37*   ALT (SGPT) U/L 31   ALK PHOS U/L 207*   BILIRUBIN mg/dL 0.4   ANION GAP mmol/L 9.5       MRI Foot Left With & Without Contrast    Result Date: 10/29/2024  Impression: 1.Osteomyelitis centered at the second proximal interphalangeal joint of the second toe with ulceration at the dorsal aspect of the proximal interphalangeal joint. The middle phalanx and majority of proximal phalanx have abnormal marrow signal intensity. 2.There is also an age-indeterminate fracture of the base of the proximal phalanx second digit with mild impaction. No drainable fluid collection to suggest abscess. 3.Severe arthritis of the second metatarsophalangeal  joint. Moderate arthritis third metatarsophalangeal joint. Status post great toe amputation. 4.Muscular atrophy likely consistent with diabetes. 5.Nonspecific dorsal soft tissue edema. 6.Degenerative changes moderate to severe in degree of the hindfoot most pronounced at the talonavicular joint. Electronically Signed: Dalia Ramirez MD  10/29/2024 8:39 AM EDT  Workstation ID: [id number]       I reviewed the patient's new clinical results.    Assessment/Plan:     Active and Resolved Problems  Active Hospital Problems    Diagnosis  POA    **Cellulitis of left foot [L03.116]  Yes    Ulcer of toe due to diabetes mellitus [E11.621, L97.509]  Unknown    Cellulitis [L03.90]  Unknown      Resolved Hospital Problems   No resolved problems to display.       Left foot osteomyelitis  -Patient still has some erythema and edema surrounding the left second and third toe consistent with cellulitis  -MRI left foot ordered that showed osteomyelitis at the second proximal interphalange joint of second toe  -Podiatry consulted, Schedule patient for second toe amputation  -Infectious disease consulted, started patient on vancomycin and cefepime.  -Left lower extremity arterial Doppler ordered to assess blood flow     DM type II, uncontrolled with hyperglycemia  -Increase Lantus to 40 Monty twice daily and prandial insulin to 15 units 3 times daily with meals as well as hide sliding scale       Hypertension  -Continue lisinopril     Hypothyroidism  -Continue Synthroid     Morbid Obesity  -Counseled patient on diet and exercise to improve weight loss and comorbid conditions       VTE Prophylaxis:  Pharmacologic VTE prophylaxis orders are present.              Time: 35 minutes     Code Status and Medical Interventions: CPR (Attempt to Resuscitate); Full Support   Ordered at: 10/26/24 1826     Code Status (Patient has no pulse and is not breathing):    CPR (Attempt to Resuscitate)     Medical Interventions (Patient has pulse or is breathing):     Full Support       Signature: Electronically signed by Luis Alberto Mccoy MD, 10/30/24, 12:08 EDT.  [facility]ist Team     Resulted Resulted

## 2024-10-30 NOTE — CASE MANAGEMENT/SOCIAL WORK
Continued Stay Note  [facility]     Patient Name: Marilou Abbott  MRN: [id number]  Today's Date: 10/30/2024    Admit Date: 10/26/2024    Plan: Routin home with spouse. Watch for IV abx.   Discharge Plan       Row Name 10/30/24 1251       Plan    Plan Routin home with spouse. Watch for IV abx.    Provided Post Acute Provider List? N/A    Provided Post Acute Provider Quality & Resource List? N/A    Plan Comments D/C barriers: IV antibiotics, 10/30/24 Amutation of left second toe.             Expected Discharge Date and Time       Expected Discharge Date Expected Discharge Time    Nov 1, 2024         Lavern Menjivar RN     [address] [address]  Phone: [phone]  Fax: [phone]

## 2024-10-30 NOTE — ANESTHESIA PROCEDURE NOTES
Airway  Date/Time: 10/30/2024 1:45 PM    Final Airway Details  Final airway type: supraglottic airway      Successful airway: LMA  Size 4

## 2024-10-30 NOTE — OP NOTE
AMPUTATION DIGIT  Procedure Report    Patient Name:  Marilou Abbott  YOB: 1970    Date of Surgery:  10/30/2024     Indications: Osteomyelitis second toe left with diabetes    Pre-op Diagnosis:   Osteomyelitis second toe left       Post-Op Diagnosis Codes:  Same    Procedure/CPT® Codes:      Procedure(s):  AMPUTATION LEFT SECOND TOE    Staff:  Surgeon(s):  Kan Gray DPM         Anesthesiologist: Rachelle Charles MD    Anesthesia: General    Estimated Blood Loss: minimal    Implants:    Nothing was implanted during the procedure        Findings: Infected second toe left foot    Complications: None    Description of Procedure: Patient is a pleasant 54-year-old female that developed an ulceration to the proximal interphalangeal joint on her left second toe.  Patient has been in the wound care center since June of this year however was not feeling well ended up going to the emergency room where she was admitted.  MRI was run that showed osteomyelitis in the proximal middle phalanx of the second toe left foot.  We discussed TMA versus primary second toe amputation.  Patient wants to proceed with a second toe amputation which is still feasible especially since she is only missing the hallux.  The risk of surgery include but not limited to bleeding infection nerve damage loss of limb loss of life.  All questions were answered for the patient no guarantees given or implied about the outcome of the procedure.  A consent was signed and placed in the chart    Procedure number #1 second toe amputation left foot  Patient was brought to the operating room placed on operative table supine position where pneumatic ankle tourniquet on the left ankle was applied once when general anesthesia was achieved.  Left foot was also anesthetized and a second ray block of an equal mixture of 10 cc of 1% lidocaine plain and half percent Marcaine plain.  Left foot was then scrubbed prepped and draped in usual aseptic manner  timeout was performed all were in agreement for second toe amputation the tourniquet was then inflated 250 mmHg.  Attention was then directed to the second toe where 2 converging semielliptical incisions were made with medial and lateral flaps.  Full-thickness incisions were made flaps were peeled off from the proximal phalanx until the second MPJ was identified.  At this time the second toe was detached of all its soft tissue attachments and passed from the operating site.  A deep culture was taken at this time to include Gram stain aerobic anaerobic acid-fast and fungal.  Tourniquet was deflated total tourniquet time was 6 minutes.  At this time any bleeders were cauterized as necessary.  The wound was then flushed with 2 L of normal sterile saline.  Some Amelie was applied into the wound to help control any of the oozing that was occurring but no punctures were noted.  Closure then consisted of 3-0 Vicryl but minimal and was used only to close the deep tissue.  3-0 nylon was then utilized to close the skin but a combination of horizontal vertical mattress and simple suture techniques were used.  Vertical mattress were used to close any of the deep spaces is much as possible and horizontal's were used at the proximal and distal end and simple sutures were to reapproximate anything that needed slightly improved coaptation.  Postoperative block of 15 cc of quarter percent Marcaine plain was infiltrated around the second ray.  Dressing consisted of Betadine Adaptic 4 x 4's cast padding Kerlix and an Ace and a surgical shoe.  Patient will return to the floor continue with her antibiotics.  Will plan on changing dressing tomorrow most likely will go home on Friday.  Will discuss with infectious disease      Kan Gray DPM     Date: 10/30/2024  Time: 14:42 EDT

## 2024-10-30 NOTE — ANESTHESIA PREPROCEDURE EVALUATION
Anesthesia Evaluation     Patient summary reviewed   no history of anesthetic complications:   NPO Solid Status: > 8 hours  NPO Liquid Status: > 8 hours           Airway   Mallampati: III  TM distance: >3 FB  Neck ROM: full  Possible difficult intubation and Large neck circumference  Dental - normal exam     Pulmonary - normal exam   (+) asthma,sleep apnea  Cardiovascular - normal exam    ECG reviewed    (+) hypertension      Neuro/Psych  (-) CVA  GI/Hepatic/Renal/Endo    (+) morbid obesity, GERD, diabetes mellitus type 2 poorly controlled, thyroid problem hypothyroidism    Musculoskeletal     Abdominal    Substance History      OB/GYN          Other                      Anesthesia Plan    ASA 3     general     intravenous induction     Anesthetic plan, risks, benefits, and alternatives have been provided, discussed and informed consent has been obtained with: patient.    Plan discussed with CRNA and CAA.    CODE STATUS:    Code Status (Patient has no pulse and is not breathing): CPR (Attempt to Resuscitate)  Medical Interventions (Patient has pulse or is breathing): Full Support

## 2024-10-30 NOTE — PLAN OF CARE
Goal Outcome Evaluation:      Patient sleeping in between care. No complaints of pain this shift. Patient NPO since midnight for amputation of left second toe today. Patient stable at this time.

## 2024-10-30 NOTE — PROGRESS NOTES
Infectious Diseases Progress Note      LOS: 4 days   Patient Care Team:  Kishore Penn MD as PCP - General    Chief Complaint: Left second toe wound    Subjective       The patient has been afebrile for the last 24 hours.  The patient is on room air, hemodynamically stable, and is tolerating antimicrobial therapy.  No new complaints      Review of Systems:   Review of Systems   Constitutional: Negative.    HENT: Negative.     Eyes: Negative.    Respiratory: Negative.     Cardiovascular: Negative.    Gastrointestinal: Negative.    Endocrine: Negative.    Genitourinary: Negative.    Musculoskeletal: Negative.    Skin:  Positive for color change and wound.   Neurological: Negative.    Psychiatric/Behavioral: Negative.     All other systems reviewed and are negative.       Objective     Vital Signs  Temp:  [97.4 °F (36.3 °C)-98.1 °F (36.7 °C)] 97.5 °F (36.4 °C)  Heart Rate:  [60-88] 72  Resp:  [10-22] 18  BP: (119-153)/(57-76) 129/57    Physical Exam:  Physical Exam  Vitals and nursing note reviewed.   Constitutional:       General: She is not in acute distress.     Appearance: Normal appearance. She is well-developed and normal weight. She is not diaphoretic.   HENT:      Head: Normocephalic and atraumatic.   Eyes:      General: No scleral icterus.     Extraocular Movements: Extraocular movements intact.      Conjunctiva/sclera: Conjunctivae normal.      Pupils: Pupils are equal, round, and reactive to light.   Cardiovascular:      Rate and Rhythm: Normal rate and regular rhythm.      Heart sounds: Normal heart sounds, S1 normal and S2 normal. No murmur heard.  Pulmonary:      Effort: Pulmonary effort is normal. No respiratory distress.      Breath sounds: Normal breath sounds. No stridor. No wheezing or rales.   Chest:      Chest wall: No tenderness.   Abdominal:      General: Bowel sounds are normal. There is no distension.      Palpations: Abdomen is soft. There is no mass.      Tenderness: There is no  abdominal tenderness. There is no guarding.   Musculoskeletal:         General: No swelling, tenderness or deformity. Normal range of motion.      Cervical back: Neck supple.   Skin:     General: Skin is warm and dry.      Coloration: Skin is not pale.      Findings: No bruising, erythema or rash.      Comments: Previous great toe amputation with healed incision.  Patient's left second toe has significant edema erythema and a open wound to the plantar aspect of the toe with purulent drainage.  There is some cellulitic changes to the dorsal aspect of the foot.  Pulses are palpable    Neurological:      General: No focal deficit present.      Mental Status: She is alert and oriented to person, place, and time. Mental status is at baseline.      Cranial Nerves: No cranial nerve deficit.   Psychiatric:         Mood and Affect: Mood normal.          Results Review:    I have reviewed all clinical data, test, lab, and imaging results.     Radiology  No Radiology Exams Resulted Within Past 24 Hours    Cardiology    Laboratory    Results from last 7 days   Lab Units 10/29/24  2220 10/29/24  0525 10/28/24  0350 10/27/24  0607 10/26/24  1609   WBC 10*3/mm3 7.42 7.12 5.42 5.48 5.36   HEMOGLOBIN g/dL 10.9* 9.9* 9.5* 9.7* 11.0*   HEMATOCRIT % 34.8 30.9* 30.4* 30.3* 34.6   PLATELETS 10*3/mm3 456* 400 381 352 346     Results from last 7 days   Lab Units 10/29/24  2220 10/29/24  0525 10/28/24  0350 10/27/24  0953 10/26/24  1609   SODIUM mmol/L 133* 134* 136 133* 135*   POTASSIUM mmol/L 4.8 5.1 5.2 4.9 4.5   CHLORIDE mmol/L 97* 101 104 101 98   CO2 mmol/L 26.5 23.3 23.7 22.6 25.0   BUN mg/dL 11 10 12 15 20   CREATININE mg/dL 0.92 0.76 0.81 0.87 1.14*   GLUCOSE mg/dL 200* 224* 253* 219* 66   ALBUMIN g/dL 3.8 3.4* 3.3* 3.3* 3.6   BILIRUBIN mg/dL 0.4 0.4 0.4 0.6 0.6   ALK PHOS U/L 207* 187* 192* 178* 179*   AST (SGOT) U/L 37* 34* 56* 78* 87*   ALT (SGPT) U/L 31 31 42* 45* 49*   CALCIUM mg/dL 9.5 9.0 8.5* 8.4* 8.6     Results from last  7 days   Lab Units 10/26/24  1609   CK TOTAL U/L 52     Results from last 7 days   Lab Units 10/26/24  1609   SED RATE mm/hr 24         Microbiology   Microbiology Results (last 10 days)       Procedure Component Value - Date/Time    Wound Culture - Wound, Foot, Left [[id number]] Collected: 10/29/24 1823    Lab Status: Preliminary result Specimen: Wound from Foot, Left Updated: 10/30/24 0847     Wound Culture No growth     Gram Stain Rare (1+) WBCs seen    Respiratory Panel PCR w/COVID-19(SARS-CoV-2) SHAYNE/JAMESON/ANDREW/PAD/COR/DARRYL In-House, NP Swab in UTM/VTM, 2 HR TAT - Swab, Nasopharynx [[id number]]  (Normal) Collected: 10/26/24 1807    Lab Status: Final result Specimen: Swab from Nasopharynx Updated: 10/26/24 1859     ADENOVIRUS, PCR Not Detected     Coronavirus 229E Not Detected     Coronavirus HKU1 Not Detected     Coronavirus NL63 Not Detected     Coronavirus OC43 Not Detected     COVID19 Not Detected     Human Metapneumovirus Not Detected     Human Rhinovirus/Enterovirus Not Detected     Influenza A PCR Not Detected     Influenza B PCR Not Detected     Parainfluenza Virus 1 Not Detected     Parainfluenza Virus 2 Not Detected     Parainfluenza Virus 3 Not Detected     Parainfluenza Virus 4 Not Detected     RSV, PCR Not Detected     Bordetella pertussis pcr Not Detected     Bordetella parapertussis PCR Not Detected     Chlamydophila pneumoniae PCR Not Detected     Mycoplasma pneumo by PCR Not Detected    Narrative:      In the setting of a positive respiratory panel with a viral infection PLUS a negative procalcitonin without other underlying concern for bacterial infection, consider observing off antibiotics or discontinuation of antibiotics and continue supportive care. If the respiratory panel is positive for atypical bacterial infection (Bordetella pertussis, Chlamydophila pneumoniae, or Mycoplasma pneumoniae), consider antibiotic de-escalation to target atypical bacterial infection.    Blood Culture - Blood, Arm,  Right [[id number]]  (Normal) Collected: 10/26/24 1652    Lab Status: Preliminary result Specimen: Blood from Arm, Right Updated: 10/29/24 1701     Blood Culture No growth at 3 days    Blood Culture - Blood, Arm, Left [[id number]]  (Normal) Collected: 10/26/24 1609    Lab Status: Preliminary result Specimen: Blood from Arm, Left Updated: 10/29/24 1630     Blood Culture No growth at 3 days            Medication Review:       Schedule Meds  budesonide-formoterol, 2 puff, Inhalation, BID - RT  buPROPion XL, 300 mg, Oral, Daily  cefepime, 2,000 mg, Intravenous, Q8H  divalproex, 1,500 mg, Oral, Nightly  gabapentin, 300 mg, Oral, Q8H  [START ON 10/31/2024] heparin (porcine), 5,000 Units, Subcutaneous, Q8H  insulin glargine, 40 Units, Subcutaneous, Q12H  insulin lispro, 15 Units, Subcutaneous, TID With Meals  insulin lispro, 3-14 Units, Subcutaneous, 4x Daily AC & at Bedtime  levothyroxine, 200 mcg, Oral, Q AM  lisinopril, 20 mg, Oral, Q24H  pantoprazole, 40 mg, Oral, Q AM  sodium chloride, 10 mL, Intravenous, Q12H  vancomycin, 1,250 mg, Intravenous, Q12H  ziprasidone, 60 mg, Oral, Nightly        Infusion Meds  Pharmacy to dose vancomycin,         PRN Meds    acetaminophen **OR** acetaminophen **OR** acetaminophen    acetaminophen **OR** acetaminophen    albuterol    benzonatate    senna-docusate sodium **AND** polyethylene glycol **AND** bisacodyl **AND** bisacodyl    Calcium Replacement - Follow Nurse / BPA Driven Protocol    dextrose    dextrose    Magnesium Standard Dose Replacement - Follow Nurse / BPA Driven Protocol    nitroglycerin    Pharmacy to dose vancomycin    Phosphorus Replacement - Follow Nurse / BPA Driven Protocol    Potassium Replacement - Follow Nurse / BPA Driven Protocol    [COMPLETED] Insert Peripheral IV **AND** sodium chloride    sodium chloride        Assessment & Plan       Antimicrobial Therapy   1.  IV vancomycin        2.  IV cefepime        3.        4.        5.            Assessment     Left  diabetic foot with osteomyelitis involving the second digit proximal interphalangeal joint.  The patient has strong pulse.  Wound culture pending     Remote history of right big toe amputation with healed site.     Type 2 diabetes-A1c is 8.06     Plan     Continue IV cefepime 2 g every 8 hours  Continue IV vancomycin-asked pharmacy monitor dose  Plan for left second toe amputation later today  We will probably switch to p.o. antibiotics soon waiting on intraoperative findings  Continue supportive care  A.m. labs  Patient needs to get her blood sugars under control to heal this infection and avoid future infections  Discussed with podiatry    MARTA Ko  10/30/24  16:14 EDT    Note is dictated utilizing voice recognition software/Dragon

## 2024-10-31 LAB
ALBUMIN SERPL-MCNC: 3.3 G/DL (ref 3.5–5.2)
ALBUMIN/GLOB SERPL: 1 G/DL
ALP SERPL-CCNC: 135 U/L (ref 39–117)
ALT SERPL W P-5'-P-CCNC: 20 U/L (ref 1–33)
ANION GAP SERPL CALCULATED.3IONS-SCNC: 9.6 MMOL/L (ref 5–15)
AST SERPL-CCNC: 24 U/L (ref 1–32)
BACTERIA SPEC AEROBE CULT: NORMAL
BACTERIA SPEC AEROBE CULT: NORMAL
BASOPHILS # BLD AUTO: 0.03 10*3/MM3 (ref 0–0.2)
BASOPHILS NFR BLD AUTO: 0.4 % (ref 0–1.5)
BILIRUB SERPL-MCNC: 0.4 MG/DL (ref 0–1.2)
BUN SERPL-MCNC: 9 MG/DL (ref 6–20)
BUN/CREAT SERPL: 10.7 (ref 7–25)
CALCIUM SPEC-SCNC: 9.2 MG/DL (ref 8.6–10.5)
CHLORIDE SERPL-SCNC: 100 MMOL/L (ref 98–107)
CO2 SERPL-SCNC: 24.4 MMOL/L (ref 22–29)
CREAT SERPL-MCNC: 0.84 MG/DL (ref 0.57–1)
DEPRECATED RDW RBC AUTO: 50.3 FL (ref 37–54)
EGFRCR SERPLBLD CKD-EPI 2021: 82.7 ML/MIN/1.73
EOSINOPHIL # BLD AUTO: 0.09 10*3/MM3 (ref 0–0.4)
EOSINOPHIL NFR BLD AUTO: 1.3 % (ref 0.3–6.2)
ERYTHROCYTE [DISTWIDTH] IN BLOOD BY AUTOMATED COUNT: 15.1 % (ref 12.3–15.4)
GLOBULIN UR ELPH-MCNC: 3.4 GM/DL
GLUCOSE BLDC GLUCOMTR-MCNC: 136 MG/DL (ref 70–105)
GLUCOSE BLDC GLUCOMTR-MCNC: 187 MG/DL (ref 70–105)
GLUCOSE BLDC GLUCOMTR-MCNC: 203 MG/DL (ref 70–105)
GLUCOSE BLDC GLUCOMTR-MCNC: 239 MG/DL (ref 70–105)
GLUCOSE SERPL-MCNC: 111 MG/DL (ref 65–99)
HCT VFR BLD AUTO: 30.7 % (ref 34–46.6)
HGB BLD-MCNC: 9.5 G/DL (ref 12–15.9)
IMM GRANULOCYTES # BLD AUTO: 0.17 10*3/MM3 (ref 0–0.05)
IMM GRANULOCYTES NFR BLD AUTO: 2.4 % (ref 0–0.5)
LYMPHOCYTES # BLD AUTO: 2.12 10*3/MM3 (ref 0.7–3.1)
LYMPHOCYTES NFR BLD AUTO: 30.5 % (ref 19.6–45.3)
MAGNESIUM SERPL-MCNC: 2.1 MG/DL (ref 1.6–2.6)
MCH RBC QN AUTO: 28.3 PG (ref 26.6–33)
MCHC RBC AUTO-ENTMCNC: 30.9 G/DL (ref 31.5–35.7)
MCV RBC AUTO: 91.4 FL (ref 79–97)
MONOCYTES # BLD AUTO: 0.62 10*3/MM3 (ref 0.1–0.9)
MONOCYTES NFR BLD AUTO: 8.9 % (ref 5–12)
NEUTROPHILS NFR BLD AUTO: 3.93 10*3/MM3 (ref 1.7–7)
NEUTROPHILS NFR BLD AUTO: 56.5 % (ref 42.7–76)
NRBC BLD AUTO-RTO: 0 /100 WBC (ref 0–0.2)
PHOSPHATE SERPL-MCNC: 3.7 MG/DL (ref 2.5–4.5)
PLATELET # BLD AUTO: 379 10*3/MM3 (ref 140–450)
PMV BLD AUTO: 9.7 FL (ref 6–12)
POTASSIUM SERPL-SCNC: 4.8 MMOL/L (ref 3.5–5.2)
PROT SERPL-MCNC: 6.7 G/DL (ref 6–8.5)
RBC # BLD AUTO: 3.36 10*6/MM3 (ref 3.77–5.28)
SODIUM SERPL-SCNC: 134 MMOL/L (ref 136–145)
VANCOMYCIN PEAK SERPL-MCNC: 20.5 MCG/ML (ref 20–40)
VANCOMYCIN TROUGH SERPL-MCNC: 13.8 MCG/ML (ref 5–20)
WBC NRBC COR # BLD AUTO: 6.96 10*3/MM3 (ref 3.4–10.8)

## 2024-10-31 PROCEDURE — 82948 REAGENT STRIP/BLOOD GLUCOSE: CPT | Performed by: PODIATRIST

## 2024-10-31 PROCEDURE — 94799 UNLISTED PULMONARY SVC/PX: CPT

## 2024-10-31 PROCEDURE — 80202 ASSAY OF VANCOMYCIN: CPT | Performed by: PODIATRIST

## 2024-10-31 PROCEDURE — 84100 ASSAY OF PHOSPHORUS: CPT | Performed by: PODIATRIST

## 2024-10-31 PROCEDURE — 63710000001 INSULIN LISPRO (HUMAN) PER 5 UNITS: Performed by: PODIATRIST

## 2024-10-31 PROCEDURE — 25010000002 VANCOMYCIN HCL 1.25 G RECONSTITUTED SOLUTION 1 EACH VIAL: Performed by: PODIATRIST

## 2024-10-31 PROCEDURE — 85025 COMPLETE CBC W/AUTO DIFF WBC: CPT | Performed by: PODIATRIST

## 2024-10-31 PROCEDURE — 80053 COMPREHEN METABOLIC PANEL: CPT | Performed by: PODIATRIST

## 2024-10-31 PROCEDURE — 25010000002 CEFEPIME PER 500 MG: Performed by: PODIATRIST

## 2024-10-31 PROCEDURE — 25810000003 SODIUM CHLORIDE 0.9 % SOLUTION 250 ML FLEX CONT: Performed by: PODIATRIST

## 2024-10-31 PROCEDURE — 94664 DEMO&/EVAL PT USE INHALER: CPT

## 2024-10-31 PROCEDURE — 63710000001 INSULIN GLARGINE PER 5 UNITS: Performed by: PODIATRIST

## 2024-10-31 PROCEDURE — 82948 REAGENT STRIP/BLOOD GLUCOSE: CPT

## 2024-10-31 PROCEDURE — 94761 N-INVAS EAR/PLS OXIMETRY MLT: CPT

## 2024-10-31 PROCEDURE — 83735 ASSAY OF MAGNESIUM: CPT | Performed by: PODIATRIST

## 2024-10-31 PROCEDURE — 25010000002 HEPARIN (PORCINE) PER 1000 UNITS: Performed by: PODIATRIST

## 2024-10-31 RX ORDER — DOXYCYCLINE 100 MG/1
100 CAPSULE ORAL EVERY 12 HOURS SCHEDULED
Status: DISCONTINUED | OUTPATIENT
Start: 2024-10-31 | End: 2024-10-31

## 2024-10-31 RX ORDER — DOXYCYCLINE 100 MG/1
100 CAPSULE ORAL EVERY 12 HOURS SCHEDULED
Status: DISCONTINUED | OUTPATIENT
Start: 2024-10-31 | End: 2024-11-01 | Stop reason: HOSPADM

## 2024-10-31 RX ADMIN — PANTOPRAZOLE SODIUM 40 MG: 40 TABLET, DELAYED RELEASE ORAL at 07:01

## 2024-10-31 RX ADMIN — CEFEPIME 2000 MG: 2 INJECTION, POWDER, FOR SOLUTION INTRAVENOUS at 12:07

## 2024-10-31 RX ADMIN — LISINOPRIL 20 MG: 20 TABLET ORAL at 09:20

## 2024-10-31 RX ADMIN — LEVOTHYROXINE SODIUM 200 MCG: 0.1 TABLET ORAL at 07:01

## 2024-10-31 RX ADMIN — DOXYCYCLINE 100 MG: 100 CAPSULE ORAL at 13:51

## 2024-10-31 RX ADMIN — INSULIN LISPRO 5 UNITS: 100 INJECTION, SOLUTION INTRAVENOUS; SUBCUTANEOUS at 12:12

## 2024-10-31 RX ADMIN — INSULIN LISPRO 5 UNITS: 100 INJECTION, SOLUTION INTRAVENOUS; SUBCUTANEOUS at 20:25

## 2024-10-31 RX ADMIN — CEFEPIME 2000 MG: 2 INJECTION, POWDER, FOR SOLUTION INTRAVENOUS at 02:45

## 2024-10-31 RX ADMIN — Medication 10 ML: at 20:26

## 2024-10-31 RX ADMIN — Medication 10 ML: at 09:20

## 2024-10-31 RX ADMIN — INSULIN GLARGINE 40 UNITS: 100 INJECTION, SOLUTION SUBCUTANEOUS at 09:21

## 2024-10-31 RX ADMIN — INSULIN GLARGINE 40 UNITS: 100 INJECTION, SOLUTION SUBCUTANEOUS at 20:26

## 2024-10-31 RX ADMIN — GABAPENTIN 300 MG: 300 CAPSULE ORAL at 21:23

## 2024-10-31 RX ADMIN — AMOXICILLIN AND CLAVULANATE POTASSIUM 1 TABLET: 875; 125 TABLET, FILM COATED ORAL at 20:24

## 2024-10-31 RX ADMIN — INSULIN LISPRO 15 UNITS: 100 INJECTION, SOLUTION INTRAVENOUS; SUBCUTANEOUS at 09:21

## 2024-10-31 RX ADMIN — AMOXICILLIN AND CLAVULANATE POTASSIUM 1 TABLET: 875; 125 TABLET, FILM COATED ORAL at 13:51

## 2024-10-31 RX ADMIN — DOXYCYCLINE 100 MG: 100 CAPSULE ORAL at 20:25

## 2024-10-31 RX ADMIN — GABAPENTIN 300 MG: 300 CAPSULE ORAL at 07:01

## 2024-10-31 RX ADMIN — INSULIN LISPRO 15 UNITS: 100 INJECTION, SOLUTION INTRAVENOUS; SUBCUTANEOUS at 12:13

## 2024-10-31 RX ADMIN — HEPARIN SODIUM 5000 UNITS: 5000 INJECTION INTRAVENOUS; SUBCUTANEOUS at 21:23

## 2024-10-31 RX ADMIN — ACETAMINOPHEN 1000 MG: 500 TABLET, FILM COATED ORAL at 15:40

## 2024-10-31 RX ADMIN — HEPARIN SODIUM 5000 UNITS: 5000 INJECTION INTRAVENOUS; SUBCUTANEOUS at 07:02

## 2024-10-31 RX ADMIN — ACETAMINOPHEN 1000 MG: 500 TABLET, FILM COATED ORAL at 20:24

## 2024-10-31 RX ADMIN — BUPROPION HYDROCHLORIDE 300 MG: 150 TABLET, EXTENDED RELEASE ORAL at 09:20

## 2024-10-31 RX ADMIN — ZIPRASIDONE HYDROCHLORIDE 60 MG: 20 CAPSULE ORAL at 20:24

## 2024-10-31 RX ADMIN — INSULIN LISPRO 15 UNITS: 100 INJECTION, SOLUTION INTRAVENOUS; SUBCUTANEOUS at 17:24

## 2024-10-31 RX ADMIN — BUDESONIDE AND FORMOTEROL FUMARATE DIHYDRATE 2 PUFF: 160; 4.5 AEROSOL RESPIRATORY (INHALATION) at 07:59

## 2024-10-31 RX ADMIN — GABAPENTIN 300 MG: 300 CAPSULE ORAL at 13:51

## 2024-10-31 RX ADMIN — ACETAMINOPHEN 1000 MG: 500 TABLET, FILM COATED ORAL at 10:35

## 2024-10-31 RX ADMIN — HEPARIN SODIUM 5000 UNITS: 5000 INJECTION INTRAVENOUS; SUBCUTANEOUS at 13:51

## 2024-10-31 RX ADMIN — SODIUM CHLORIDE 1250 MG: 9 INJECTION, SOLUTION INTRAVENOUS at 09:21

## 2024-10-31 RX ADMIN — DIVALPROEX SODIUM 1500 MG: 500 TABLET, EXTENDED RELEASE ORAL at 20:24

## 2024-10-31 NOTE — PROGRESS NOTES
[facility] MEDICINE SERVICE  DAILY PROGRESS NOTE    NAME: Marilou VILCHIS Abbott  : 1970  MRN: [id number]      LOS: 5 days     PROVIDER OF SERVICE: Luis Alberto Mccoy MD    Chief Complaint: Cellulitis of left foot    Subjective:     Interval History:  History taken from: patient    Patient seen and examined at bedside this morning. Underwent Debridement with podiatry yesterday.  No acute complaints overnight.  States that her pain is tolerable with Tylenol        Review of Systems: Negative except as described above  Review of Systems    Objective:     Vital Signs  Temp:  [97.4 °F (36.3 °C)-98.1 °F (36.7 °C)] 98.1 °F (36.7 °C)  Heart Rate:  [70-82] 77  Resp:  [10-22] 18  BP: (110-149)/(56-77) 125/63  Flow (L/min) (Oxygen Therapy):  [6] 6   Body mass index is 45.19 kg/m².    Physical Exam Constitutional:       Comments: NAD    Cardiovascular:      Comments:  RRR, S1 & S2   Pulmonary:      Comments:  Lungs CTA   Abdominal:      Comments:  ABD soft, NT   Musculoskeletal:      Comments: Left foot wrapped in bandage   Physical Exam       Diagnostic Data    Results from last 7 days   Lab Units 10/31/24  0019   WBC 10*3/mm3 6.96   HEMOGLOBIN g/dL 9.5*   HEMATOCRIT % 30.7*   PLATELETS 10*3/mm3 379   GLUCOSE mg/dL 111*   CREATININE mg/dL 0.84   BUN mg/dL 9   SODIUM mmol/L 134*   POTASSIUM mmol/L 4.8   AST (SGOT) U/L 24   ALT (SGPT) U/L 20   ALK PHOS U/L 135*   BILIRUBIN mg/dL 0.4   ANION GAP mmol/L 9.6       XR Foot 3+ View Left    Result Date: 10/30/2024  Impression: Interval amputation of the second toe. Electronically Signed: Giovanni Bansal MD  10/30/2024 6:48 PM EDT  Workstation ID: [id number]       I reviewed the patient's new clinical results.    Assessment/Plan:     Active and Resolved Problems  Active Hospital Problems    Diagnosis  POA    **Cellulitis of left foot [L03.116]  Yes    Ulcer of toe due to diabetes mellitus [E11.621, L97.509]  Unknown    Cellulitis [L03.90]  Unknown      Resolved Hospital  Problems   No resolved problems to display.       Left foot osteomyelitis  -Patient still has some erythema and edema surrounding the left second and third toe consistent with cellulitis  -MRI left foot ordered that showed osteomyelitis at the second proximal interphalange joint of second toe  -Podiatry consulted, patient underwent second toe amputation on 10/30  -Infectious disease consulted, started patient on vancomycin and cefepime.  State might be able to switch to p.o. antibiotics once IOP cultures back       DM type II, uncontrolled with hyperglycemia  -Lantus, Accu-Cheks, sliding scale        Hypertension  -Continue lisinopril     Hypothyroidism  -Continue Synthroid     Morbid Obesity  -Counseled patient on diet and exercise to improve weight loss and comorbid conditions    VTE Prophylaxis:  Pharmacologic VTE prophylaxis orders are present.                Time: 35 minutes     Code Status and Medical Interventions: CPR (Attempt to Resuscitate); Full Support   Ordered at: 10/26/24 1826     Code Status (Patient has no pulse and is not breathing):    CPR (Attempt to Resuscitate)     Medical Interventions (Patient has pulse or is breathing):    Full Support       Signature: Electronically signed by Luis Alberto Mccoy MD, 10/31/24, 10:51 EDT.  [facility] Hospitalist Team

## 2024-10-31 NOTE — PLAN OF CARE
Goal Outcome Evaluation:              Outcome Evaluation: Patient rested well through the night with no c/o pain. transition to PO antibiotics in preparation for d/c.

## 2024-10-31 NOTE — CASE MANAGEMENT/SOCIAL WORK
Continued Stay Note  SAGE Antunez     Patient Name: Marilou Abbott  MRN: [id number]  Today's Date: 10/31/2024    Admit Date: 10/26/2024    Plan: Routing home with spouse. Family transport   Discharge Plan       Row Name 10/31/24 1149       Plan    Plan Routing home with spouse. Family transport    Patient/Family in Agreement with Plan yes    Plan Comments CM met with patient and spouse at bedside. CM reviewed and gave copy  IMM 10/31/24. Family will transport. DC Barrier: Wound cx pending               Expected Discharge Date and Time       Expected Discharge Date Expected Discharge Time    Nov 1, 2024         Gloria Saavedra RN    phone [phone]  fax [phone]

## 2024-10-31 NOTE — PLAN OF CARE
Problem: Adult Inpatient Plan of Care  Goal: Plan of Care Review  Outcome: Progressing  Goal: Patient-Specific Goal (Individualized)  Outcome: Progressing  Goal: Absence of Hospital-Acquired Illness or Injury  Outcome: Progressing  Intervention: Identify and Manage Fall Risk  Recent Flowsheet Documentation  Taken 10/31/2024 1600 by Mercy Ware RN  Safety Promotion/Fall Prevention: safety round/check completed  Taken 10/31/2024 1400 by Mercy Ware RN  Safety Promotion/Fall Prevention: safety round/check completed  Taken 10/31/2024 1200 by Mercy Ware RN  Safety Promotion/Fall Prevention: safety round/check completed  Taken 10/31/2024 1000 by Mercy Ware RN  Safety Promotion/Fall Prevention: safety round/check completed  Taken 10/31/2024 0815 by Mercy Ware RN  Safety Promotion/Fall Prevention: safety round/check completed  Goal: Optimal Comfort and Wellbeing  Outcome: Progressing  Goal: Readiness for Transition of Care  Outcome: Progressing     Problem: Comorbidity Management  Goal: Maintenance of Asthma Control  Outcome: Progressing  Goal: Blood Glucose Level Within Target Range  Outcome: Progressing  Goal: Blood Pressure in Desired Range  Outcome: Progressing  Goal: Maintenance of Osteoarthritis Symptom Control  Outcome: Progressing     Problem: Pain Acute  Goal: Optimal Pain Control and Function  Outcome: Progressing     Problem: Fall Injury Risk  Goal: Absence of Fall and Fall-Related Injury  Outcome: Progressing  Intervention: Promote Injury-Free Environment  Recent Flowsheet Documentation  Taken 10/31/2024 1600 by Mercy Ware RN  Safety Promotion/Fall Prevention: safety round/check completed  Taken 10/31/2024 1400 by Mercy Ware, RN  Safety Promotion/Fall Prevention: safety round/check completed  Taken 10/31/2024 1200 by Mercy Ware, RN  Safety Promotion/Fall Prevention: safety round/check completed  Taken 10/31/2024 1000 by Mercy aWre, RN  Safety  Promotion/Fall Prevention: safety round/check completed  Taken 10/31/2024 0815 by Mercy Ware RN  Safety Promotion/Fall Prevention: safety round/check completed     Problem: Skin or Soft Tissue Infection  Goal: Absence of Infection Signs and Symptoms  Outcome: Progressing     Problem: Noninvasive Ventilation Acute  Goal: Effective Unassisted Ventilation and Oxygenation  Outcome: Progressing   Goal Outcome Evaluation:   Podiatry changed dressing and said patient can discharge home.

## 2024-10-31 NOTE — PROGRESS NOTES
Infectious Diseases Progress Note      LOS: 5 days   Patient Care Team:  Kishore Penn MD as PCP - General    Chief Complaint: Left second toe wound    Subjective       The patient has been afebrile for the last 24 hours.  The patient is on room air, hemodynamically stable, and is tolerating antimicrobial therapy.  No new complaints      Review of Systems:   Review of Systems   Constitutional: Negative.    HENT: Negative.     Eyes: Negative.    Respiratory: Negative.     Cardiovascular: Negative.    Gastrointestinal: Negative.    Endocrine: Negative.    Genitourinary: Negative.    Musculoskeletal: Negative.    Skin:  Positive for color change and wound.   Neurological: Negative.    Psychiatric/Behavioral: Negative.     All other systems reviewed and are negative.       Objective     Vital Signs  Temp:  [97.4 °F (36.3 °C)-98.1 °F (36.7 °C)] 98.1 °F (36.7 °C)  Heart Rate:  [71-81] 81  Resp:  [16-22] 18  BP: (110-149)/(56-77) 137/74    Physical Exam:  Physical Exam  Vitals and nursing note reviewed.   Constitutional:       General: She is not in acute distress.     Appearance: Normal appearance. She is well-developed and normal weight. She is not diaphoretic.   HENT:      Head: Normocephalic and atraumatic.   Eyes:      General: No scleral icterus.     Extraocular Movements: Extraocular movements intact.      Conjunctiva/sclera: Conjunctivae normal.      Pupils: Pupils are equal, round, and reactive to light.   Cardiovascular:      Rate and Rhythm: Normal rate and regular rhythm.      Heart sounds: Normal heart sounds, S1 normal and S2 normal. No murmur heard.  Pulmonary:      Effort: Pulmonary effort is normal. No respiratory distress.      Breath sounds: Normal breath sounds. No stridor. No wheezing or rales.   Chest:      Chest wall: No tenderness.   Abdominal:      General: Bowel sounds are normal. There is no distension.      Palpations: Abdomen is soft. There is no mass.      Tenderness: There is no  abdominal tenderness. There is no guarding.   Musculoskeletal:         General: No swelling, tenderness or deformity. Normal range of motion.      Cervical back: Neck supple.   Skin:     General: Skin is warm and dry.      Coloration: Skin is not pale.      Findings: No bruising, erythema or rash.      Comments: Surgical dressing on left foot with boot in place   Neurological:      General: No focal deficit present.      Mental Status: She is alert and oriented to person, place, and time. Mental status is at baseline.      Cranial Nerves: No cranial nerve deficit.   Psychiatric:         Mood and Affect: Mood normal.          Results Review:    I have reviewed all clinical data, test, lab, and imaging results.     Radiology  XR Foot 3+ View Left    Result Date: 10/30/2024  XR FOOT 3+ VW LEFT Date of Exam: 10/30/2024 5:00 PM EDT Indication: 2nd toe amp Comparison: Left foot radiographs 10/26/2024 Findings: Redemonstration of prior amputation of the first toe at the level of the metatarsophalangeal joint. There are new surgical changes of amputation of the second toe at the level of the metatarsophalangeal joint. There are expected soft tissue changes at the amputation site with soft tissue swelling and small scattered soft tissue air, with overlying bandaging. Redemonstration of chronic-appearing collapse and chronic-appearing irregularity of the second metatarsal head which appears somewhat sclerotic, with prominent osteophyte. There are scattered vascular calcifications. There are degenerative changes of the ankle and midfoot.     Impression: Interval amputation of the second toe. Electronically Signed: Giovanni Bansal MD  10/30/2024 6:48 PM EDT  Workstation ID: [id number]     Cardiology    Laboratory    Results from last 7 days   Lab Units 10/31/24  0019 10/29/24  2220 10/29/24  0525 10/28/24  0350 10/27/24  0607 10/26/24  1609   WBC 10*3/mm3 6.96 7.42 7.12 5.42 5.48 5.36   HEMOGLOBIN g/dL 9.5* 10.9* 9.9* 9.5* 9.7*  11.0*   HEMATOCRIT % 30.7* 34.8 30.9* 30.4* 30.3* 34.6   PLATELETS 10*3/mm3 379 456* 400 381 352 346     Results from last 7 days   Lab Units 10/31/24  0019 10/29/24  2220 10/29/24  0525 10/28/24  0350 10/27/24  0953 10/26/24  1609   SODIUM mmol/L 134* 133* 134* 136 133* 135*   POTASSIUM mmol/L 4.8 4.8 5.1 5.2 4.9 4.5   CHLORIDE mmol/L 100 97* 101 104 101 98   CO2 mmol/L 24.4 26.5 23.3 23.7 22.6 25.0   BUN mg/dL 9 11 10 12 15 20   CREATININE mg/dL 0.84 0.92 0.76 0.81 0.87 1.14*   GLUCOSE mg/dL 111* 200* 224* 253* 219* 66   ALBUMIN g/dL 3.3* 3.8 3.4* 3.3* 3.3* 3.6   BILIRUBIN mg/dL 0.4 0.4 0.4 0.4 0.6 0.6   ALK PHOS U/L 135* 207* 187* 192* 178* 179*   AST (SGOT) U/L 24 37* 34* 56* 78* 87*   ALT (SGPT) U/L 20 31 31 42* 45* 49*   CALCIUM mg/dL 9.2 9.5 9.0 8.5* 8.4* 8.6     Results from last 7 days   Lab Units 10/26/24  1609   CK TOTAL U/L 52     Results from last 7 days   Lab Units 10/26/24  1609   SED RATE mm/hr 24         Microbiology   Microbiology Results (last 10 days)       Procedure Component Value - Date/Time    Tissue / Bone Culture - Surgical Site, Toe, Left [[id number]] Collected: 10/30/24 1410    Lab Status: Preliminary result Specimen: Surgical Site from Toe, Left Updated: 10/31/24 0730     Tissue Culture No growth     Gram Stain No WBCs or organisms seen    AFB Culture - Surgical Site, Toe, Left [[id number]] Collected: 10/30/24 1410    Lab Status: Preliminary result Specimen: Surgical Site from Toe, Left Updated: 10/31/24 0842     AFB Stain No acid fast bacilli seen on concentrated smear    Wound Culture - Wound, Foot, Left [[id number]] Collected: 10/29/24 8121    Lab Status: Preliminary result Specimen: Wound from Foot, Left Updated: 10/31/24 9082     Wound Culture Culture in progress     Gram Stain Rare (1+) WBCs seen    Respiratory Panel PCR w/COVID-19(SARS-CoV-2) SHAYNE/JAMESON/ANDREW/PAD/COR/DARRYL In-House, NP Swab in UTM/VTM, 2 HR TAT - Swab, Nasopharynx [[id number]]  (Normal) Collected: 10/26/24 1807    Lab  Status: Final result Specimen: Swab from Nasopharynx Updated: 10/26/24 1859     ADENOVIRUS, PCR Not Detected     Coronavirus 229E Not Detected     Coronavirus HKU1 Not Detected     Coronavirus NL63 Not Detected     Coronavirus OC43 Not Detected     COVID19 Not Detected     Human Metapneumovirus Not Detected     Human Rhinovirus/Enterovirus Not Detected     Influenza A PCR Not Detected     Influenza B PCR Not Detected     Parainfluenza Virus 1 Not Detected     Parainfluenza Virus 2 Not Detected     Parainfluenza Virus 3 Not Detected     Parainfluenza Virus 4 Not Detected     RSV, PCR Not Detected     Bordetella pertussis pcr Not Detected     Bordetella parapertussis PCR Not Detected     Chlamydophila pneumoniae PCR Not Detected     Mycoplasma pneumo by PCR Not Detected    Narrative:      In the setting of a positive respiratory panel with a viral infection PLUS a negative procalcitonin without other underlying concern for bacterial infection, consider observing off antibiotics or discontinuation of antibiotics and continue supportive care. If the respiratory panel is positive for atypical bacterial infection (Bordetella pertussis, Chlamydophila pneumoniae, or Mycoplasma pneumoniae), consider antibiotic de-escalation to target atypical bacterial infection.    Blood Culture - Blood, Arm, Right [[id number]]  (Normal) Collected: 10/26/24 1652    Lab Status: Preliminary result Specimen: Blood from Arm, Right Updated: 10/30/24 1700     Blood Culture No growth at 4 days    Blood Culture - Blood, Arm, Left [[id number]]  (Normal) Collected: 10/26/24 1609    Lab Status: Preliminary result Specimen: Blood from Arm, Left Updated: 10/30/24 1631     Blood Culture No growth at 4 days            Medication Review:       Schedule Meds  amoxicillin-clavulanate, 1 tablet, Oral, Q12H  budesonide-formoterol, 2 puff, Inhalation, BID - RT  buPROPion XL, 300 mg, Oral, Daily  divalproex, 1,500 mg, Oral, Nightly  doxycycline, 100 mg, Oral,  Q12H  gabapentin, 300 mg, Oral, Q8H  heparin (porcine), 5,000 Units, Subcutaneous, Q8H  insulin glargine, 40 Units, Subcutaneous, Q12H  insulin lispro, 15 Units, Subcutaneous, TID With Meals  insulin lispro, 3-14 Units, Subcutaneous, 4x Daily AC & at Bedtime  levothyroxine, 200 mcg, Oral, Q AM  lisinopril, 20 mg, Oral, Q24H  pantoprazole, 40 mg, Oral, Q AM  sodium chloride, 10 mL, Intravenous, Q12H  ziprasidone, 60 mg, Oral, Nightly        Infusion Meds         PRN Meds    acetaminophen **OR** acetaminophen **OR** acetaminophen    acetaminophen **OR** acetaminophen    albuterol    benzonatate    senna-docusate sodium **AND** polyethylene glycol **AND** bisacodyl **AND** bisacodyl    Calcium Replacement - Follow Nurse / BPA Driven Protocol    dextrose    dextrose    Magnesium Standard Dose Replacement - Follow Nurse / BPA Driven Protocol    nitroglycerin    Phosphorus Replacement - Follow Nurse / BPA Driven Protocol    Potassium Replacement - Follow Nurse / BPA Driven Protocol    [COMPLETED] Insert Peripheral IV **AND** sodium chloride    sodium chloride        Assessment & Plan       Antimicrobial Therapy   1.  IV vancomycin        2.  IV cefepime        3.        4.        5.            Assessment     Left diabetic foot with osteomyelitis involving the second digit proximal interphalangeal joint.  The patient has strong pulse.  Patient is status post left second digit amputation on 10/30/2024.  Interoperative cultures are negative so far     Remote history of right big toe amputation with healed site.     Type 2 diabetes-A1c is 8.06     Plan     Discontinue IV cefepime and IV vancomycin  Start p.o. doxycycline 100 mg twice daily for 10 days  Start p.o. Augmentin 875/125 mg twice daily for 10 days  Continue supportive care  Patient needs to get her blood sugars under control to heal this infection and avoid future infections  Discussed with podiatry  Case discussed with PETER Calvert to discharge from Infectious Disease  standpoint  Not much more to add from infectious disease standpoint-we will sign off at this time-please call with any questions.    Debby Pina, APRN  10/31/24  16:17 EDT    Note is dictated utilizing voice recognition software/Dragon

## 2024-10-31 NOTE — PROGRESS NOTES
10/31/24   Foot and Ankle Surgery - Inpatient Follow-up  Provider: Dr. Kan Gray DPM  Location: [facility] Foot and ankle  Chief Complaint: Day 1 postop    Subjective:  Marilou Abbott is a 54 y.o. female status post second toe amputation left foot.  Patient states Tylenol is controlling her discomfort.  Denies any nausea vomiting fevers chills diarrhea        Allergies   Allergen Reactions    Dust Mite Extract Unknown - Low Severity    Molds & Smuts Unknown - Low Severity    Bactrim [Sulfamethoxazole-Trimethoprim] Rash     blisters    Trimethoprim Rash       No current facility-administered medications on file prior to encounter.     Current Outpatient Medications on File Prior to Encounter   Medication Sig Dispense Refill    albuterol sulfate  (90 Base) MCG/ACT inhaler Inhale 2 puffs Every 4 (Four) Hours As Needed for Wheezing or Shortness of Air. 18 g 0    amoxicillin (AMOXIL) 875 MG tablet Take 1 tablet by mouth 2 (Two) Times a Day for 10 days. 20 tablet 0    buPROPion XL (WELLBUTRIN XL) 300 MG 24 hr tablet Take 1 tablet by mouth Every Morning.      dicyclomine (Bentyl) 10 MG capsule Take 1 capsule by mouth 4 (Four) Times a Day As Needed.      divalproex (DEPAKOTE ER) 500 MG 24 hr tablet Take 3 tablets by mouth Every Night.      gabapentin (NEURONTIN) 300 MG capsule Take 1 capsule by mouth 3 (Three) Times a Day. 90 capsule 5    insulin aspart (NovoLOG) 100 UNIT/ML patient supplied pump Inject 30-50 Units under the skin into the appropriate area as directed 3 (Three) Times a Day Before Meals.      Insulin Glargine (LANTUS SOLOSTAR) 100 UNIT/ML injection pen Inject 80 Units under the skin into the appropriate area as directed Daily.      levothyroxine (SYNTHROID, LEVOTHROID) 200 MCG tablet Take 1 tablet by mouth Daily.      lisinopril (PRINIVIL,ZESTRIL) 20 MG tablet Take 1 tablet by mouth Daily.      omeprazole (priLOSEC) 40 MG capsule Take 1 capsule by mouth 2 (Two) Times a Day.      Ozempic, 0.25 or 0.5  "MG/DOSE, 2 MG/3ML solution pen-injector Inject 0.25 mg under the skin into the appropriate area as directed. Wed      ziprasidone (GEODON) 60 MG capsule Take 1 capsule by mouth Every Evening.      glucagon (GLUCAGEN HYPOKIT) 1 MG injection GLUCAGEN HYPOKIT 1 MG SOLR         Objective   /74 (BP Location: Left arm, Patient Position: Lying)   Pulse 81   Temp 98.1 °F (36.7 °C) (Oral)   Resp 18   Ht 172.7 cm (68\")   Wt 135 kg (297 lb 3.2 oz)   SpO2 97%   BMI 45.19 kg/m²     General: Alert and oriented sitting in the chair  Vascular: Adequate bleeding for healing  Neuro: Light touch and protective sensations diminished left foot  MSK: Status post left second toe amputation  Derm: Dressing has about a quarter size breakthrough blood present on the plantar aspect.  Dressing down shows no active bleeding present incision site well coapted sutures intact decreased edema present.  Resolving erythema present.  No wound dehiscence present.  All that is noted is within normal limits.  Normal healing noted for second toe amputation.       Results from last 7 days   Lab Units 10/31/24  0019   WBC 10*3/mm3 6.96   HEMOGLOBIN g/dL 9.5*   HEMATOCRIT % 30.7*   PLATELETS 10*3/mm3 379       Assessment & Plan   Status post dressing change left foot second toe amputation.  Dressing consisted of Betadine Adaptic 4 x 4's cast padding Kerlix and an Ace bandage and surgical shoe.  Patient to limit her ambulation to no more than 10 minutes/h in surgical shoe i.e. she can get up to go the bathroom and grab some to eat.  Needs to work on keeping the left foot elevated anytime she sitting.  Do not get dressing wet do not remove.  Patient will call office tomorrow to schedule follow-up appointment on Monday.  She should be okay to take Tylenol for any discomfort.  Emphasized not to ambulate without the surgical shoe and the importance of it.  Patient was told not to exceed 4000 mg of Tylenol a day.  I suspect she will only need a few " Tylenol total for any discomfort due to her neuropathy.  Any problems patient has my number she can call over the weekend things to look for were discussed such as nausea vomiting fevers chills diarrhea.  Antibiotics per infectious disease.  Talked about the importance of replenishing the good bacteria with yogurt or some type of fermented food or probiotic to lessen the chance of severe diarrhea or C. Difficile.  Okay to discharge home from podiatry standpoint.     Note is dictated utilizing voice recognition software. Unfortunately this leads to occasional typographical errors. I apologize in advance if the situation occurs. If questions occur please do not hesitate to call our office.

## 2024-11-01 ENCOUNTER — READMISSION MANAGEMENT (OUTPATIENT)
Dept: CALL CENTER | Facility: HOSPITAL | Age: 54
End: 2024-11-01
Payer: OTHER GOVERNMENT

## 2024-11-01 VITALS
TEMPERATURE: 97.5 F | WEIGHT: 293 LBS | BODY MASS INDEX: 44.41 KG/M2 | OXYGEN SATURATION: 97 % | HEIGHT: 68 IN | SYSTOLIC BLOOD PRESSURE: 130 MMHG | HEART RATE: 68 BPM | DIASTOLIC BLOOD PRESSURE: 72 MMHG | RESPIRATION RATE: 18 BRPM

## 2024-11-01 LAB
GLUCOSE BLDC GLUCOMTR-MCNC: 189 MG/DL (ref 70–105)
GLUCOSE BLDC GLUCOMTR-MCNC: 192 MG/DL (ref 70–105)
GLUCOSE BLDC GLUCOMTR-MCNC: 193 MG/DL (ref 70–105)
LAB AP CASE REPORT: NORMAL
PATH REPORT.FINAL DX SPEC: NORMAL
PATH REPORT.GROSS SPEC: NORMAL

## 2024-11-01 PROCEDURE — 82948 REAGENT STRIP/BLOOD GLUCOSE: CPT | Performed by: PODIATRIST

## 2024-11-01 PROCEDURE — 63710000001 INSULIN GLARGINE PER 5 UNITS: Performed by: PODIATRIST

## 2024-11-01 PROCEDURE — 25010000002 HEPARIN (PORCINE) PER 1000 UNITS: Performed by: PODIATRIST

## 2024-11-01 PROCEDURE — 82948 REAGENT STRIP/BLOOD GLUCOSE: CPT

## 2024-11-01 PROCEDURE — 63710000001 INSULIN LISPRO (HUMAN) PER 5 UNITS: Performed by: PODIATRIST

## 2024-11-01 RX ORDER — DOXYCYCLINE 100 MG/1
100 CAPSULE ORAL EVERY 12 HOURS SCHEDULED
Qty: 18 CAPSULE | Refills: 0 | Status: SHIPPED | OUTPATIENT
Start: 2024-11-01 | End: 2024-11-10

## 2024-11-01 RX ORDER — BUDESONIDE AND FORMOTEROL FUMARATE DIHYDRATE 80; 4.5 UG/1; UG/1
2 AEROSOL RESPIRATORY (INHALATION)
Qty: 6.9 G | Refills: 0 | Status: SHIPPED | OUTPATIENT
Start: 2024-11-01

## 2024-11-01 RX ADMIN — AMOXICILLIN AND CLAVULANATE POTASSIUM 1 TABLET: 875; 125 TABLET, FILM COATED ORAL at 09:11

## 2024-11-01 RX ADMIN — GABAPENTIN 300 MG: 300 CAPSULE ORAL at 05:28

## 2024-11-01 RX ADMIN — DOXYCYCLINE 100 MG: 100 CAPSULE ORAL at 09:11

## 2024-11-01 RX ADMIN — INSULIN GLARGINE 40 UNITS: 100 INJECTION, SOLUTION SUBCUTANEOUS at 09:09

## 2024-11-01 RX ADMIN — LEVOTHYROXINE SODIUM 200 MCG: 0.1 TABLET ORAL at 05:28

## 2024-11-01 RX ADMIN — INSULIN LISPRO 3 UNITS: 100 INJECTION, SOLUTION INTRAVENOUS; SUBCUTANEOUS at 09:12

## 2024-11-01 RX ADMIN — HEPARIN SODIUM 5000 UNITS: 5000 INJECTION INTRAVENOUS; SUBCUTANEOUS at 05:28

## 2024-11-01 RX ADMIN — Medication 10 ML: at 09:14

## 2024-11-01 RX ADMIN — PANTOPRAZOLE SODIUM 40 MG: 40 TABLET, DELAYED RELEASE ORAL at 05:28

## 2024-11-01 RX ADMIN — BUPROPION HYDROCHLORIDE 300 MG: 150 TABLET, EXTENDED RELEASE ORAL at 09:11

## 2024-11-01 RX ADMIN — INSULIN LISPRO 15 UNITS: 100 INJECTION, SOLUTION INTRAVENOUS; SUBCUTANEOUS at 09:10

## 2024-11-01 RX ADMIN — LISINOPRIL 20 MG: 20 TABLET ORAL at 09:11

## 2024-11-01 NOTE — PLAN OF CARE
Problem: Adult Inpatient Plan of Care  Goal: Plan of Care Review  Outcome: Met  Goal: Patient-Specific Goal (Individualized)  Outcome: Met  Goal: Absence of Hospital-Acquired Illness or Injury  Outcome: Met  Goal: Optimal Comfort and Wellbeing  Outcome: Met  Goal: Readiness for Transition of Care  Outcome: Met     Problem: Comorbidity Management  Goal: Maintenance of Asthma Control  Outcome: Met  Goal: Blood Glucose Level Within Target Range  Outcome: Met  Goal: Blood Pressure in Desired Range  Outcome: Met  Goal: Maintenance of Osteoarthritis Symptom Control  Outcome: Met     Problem: Pain Acute  Goal: Optimal Pain Control and Function  Outcome: Met     Problem: Fall Injury Risk  Goal: Absence of Fall and Fall-Related Injury  Outcome: Met     Problem: Skin or Soft Tissue Infection  Goal: Absence of Infection Signs and Symptoms  Outcome: Met     Problem: Noninvasive Ventilation Acute  Goal: Effective Unassisted Ventilation and Oxygenation  Outcome: Met   Goal Outcome Evaluation:

## 2024-11-01 NOTE — OUTREACH NOTE
Prep Survey      Flowsheet Row Responses   [facility] facility patient discharged from? [facility]   Is LACE score < 7 ? No   Eligibility Readm Mgmt   Discharge diagnosis AMPUTATION LEFT SECOND TOE   Does the patient have one of the following disease processes/diagnoses(primary or secondary)? General Surgery   Prep survey completed? Yes            Ladan WARNER - Registered Nurse

## 2024-11-01 NOTE — DISCHARGE SUMMARY
"[facility]  Discharge Summary    Date of Service: 2024  Patient Name: Marilou Abbott  : 1970  MRN: [id number]    Date of Admission: 10/26/2024  Discharge Diagnosis: Cellulitis of left foot  Date of Discharge: 2024  Primary Care Physician: Kishore Penn MD      Presenting Problem:   History of diabetes mellitus [Z86.39]  Cellulitis of left foot [L03.116]  Diabetic ulcer of toe of left foot associated with diabetes mellitus of other type, unspecified ulcer stage [E13.621, L97.529]    Active and Resolved Hospital Problems:  Active Hospital Problems    Diagnosis POA    **Cellulitis of left foot [L03.116] Yes    Ulcer of toe due to diabetes mellitus [E11.621, L97.509] Unknown    Cellulitis [L03.90] Unknown      Resolved Hospital Problems   No resolved problems to display.         Hospital Course     HPI:    \"Marilou Abbott is a 54 y.o. female with a CMH of asthma, type 2 diabetes, thyroid disease, hypertension, depression, anxiety, diabetic peripheral neuropathy complicated by amputation of left great toe who presented to [facility] on 10/26/2024 with left second toe cellulitis.  She has had recurrent nonhealing wound to left second toe since  that mildly improving get worse..  At the wound center and her podiatrist in the past week prior to presentation she URI symptoms with fever and that improved with amoxicillin from urgent care.  She reports that due to generalized fatigue she fell 3 times within the week which further open to wound, on day of presentation she noticed blood on stockings from the wound with worsening redness prompting her to report to the emergency department.  She denies loss of consciousness, head strike, nausea, vomiting, diarrhea sick contacts or recent travels        Pertinent ED findings: Afebrile, CRP 1.63, ESR 24, no leukocytosis, creatinine 1.14, AST 87, ALT 49 unremarkable CBC.  X-ray of the left foot shows soft " "tissue swelling of the second and third digits however no convincing radiographic evidence of osteomyelitis.  She received vancomycin and Rocephin in the ED\"    Hospital Course:  Left foot osteomyelitis  -Patient still has some erythema and edema surrounding the left second and third toe consistent with cellulitis  -MRI left foot ordered that showed osteomyelitis at the second proximal interphalange joint of second toe  -Podiatry consulted, patient underwent second toe amputation on 10/30  -Infectious disease consulted, started patient on vancomycin and cefepime.   recommended to discontinue cefepime and add vancomycin and can continue on doxycycline and Augmentin p.o. for 10 days        DM type II, uncontrolled with hyperglycemia  -Lantus, Accu-Cheks, sliding scale        Hypertension  -Continue lisinopril     Hypothyroidism  -Continue Synthroid     Morbid Obesity  -Counseled patient on diet and exercise to improve weight loss and comorbid conditions    Patient seen and examined at bedside this morning.  She refused a.m. labs this morning.  She underwent second toe amputation with podiatry on 10/30, and was initially on vancomycin and cefepime however infectious disease then switched her to Augmentin and doxycycline p.o. and stated that she is stable for discharge.  Will send in prescription for Augmentin and doxycycline to her pharmacy.  She has a follow-up appointment with podiatry on Monday 11/4.    DISCHARGE Follow Up Recommendations for labs and diagnostics: Follow-up with PCP in 1 week and podiatry outpatient        Day of Discharge     Vital Signs:  Temp:  [97.5 °F (36.4 °C)-98 °F (36.7 °C)] 97.5 °F (36.4 °C)  Heart Rate:  [68-88] 68  Resp:  [16-20] 18  BP: (121-142)/(59-74) 130/72    Physical Exam:  Physical Exam  Constitutional:       Comments: NAD    Cardiovascular:      Comments:  RRR, S1 & S2   Pulmonary:      Comments:  Lungs CTA   Abdominal:      Comments:  ABD soft, NT             Pertinent  and/or " Most Recent Results     LAB RESULTS:      Lab 10/31/24  0019 10/29/24  2220 10/29/24  0525 10/28/24  0350 10/27/24  0607 10/26/24  1615 10/26/24  1609   WBC 6.96 7.42 7.12 5.42 5.48  --  5.36   HEMOGLOBIN 9.5* 10.9* 9.9* 9.5* 9.7*  --  11.0*   HEMATOCRIT 30.7* 34.8 30.9* 30.4* 30.3*  --  34.6   PLATELETS 379 456* 400 381 352  --  346   NEUTROS ABS 3.93 3.64 3.22 2.56 2.81  --  3.19   IMMATURE GRANS (ABS) 0.17* 0.52* 0.49* 0.28* 0.15*  --  0.15*   LYMPHS ABS 2.12 2.35 2.46 1.86 1.64  --  1.31   MONOS ABS 0.62 0.69 0.69 0.52 0.68  --  0.58   EOS ABS 0.09 0.15 0.18 0.17 0.14  --  0.07   MCV 91.4 92.3 91.7 91.3 89.9  --  89.6   SED RATE  --   --   --   --   --   --  24   CRP  --   --   --   --   --   --  6.33*   LACTATE  --   --   --   --   --  1.6  --    PROTIME  --   --   --   --   --   --  10.3   APTT  --   --   --   --   --   --  27.6         Lab 10/31/24  0019 10/29/24  2220 10/29/24  0525 10/28/24  0350 10/27/24  0953 10/27/24  0607 10/26/24  1609   SODIUM 134* 133* 134* 136 133*  --  135*   POTASSIUM 4.8 4.8 5.1 5.2 4.9  --  4.5   CHLORIDE 100 97* 101 104 101  --  98   CO2 24.4 26.5 23.3 23.7 22.6  --  25.0   ANION GAP 9.6 9.5 9.7 8.3 9.4  --  12.0   BUN 9 11 10 12 15  --  20   CREATININE 0.84 0.92 0.76 0.81 0.87  --  1.14*   EGFR 82.7 74.1 93.3 86.4 79.3  --  57.3*   GLUCOSE 111* 200* 224* 253* 219*  --  66   CALCIUM 9.2 9.5 9.0 8.5* 8.4*  --  8.6   MAGNESIUM 2.1 2.2 2.2 2.5 2.6  --   --    PHOSPHORUS 3.7 2.6 2.9 3.0  --  2.9  --    HEMOGLOBIN A1C  --   --   --   --   --  8.06*  --    TSH  --   --   --   --   --   --  1.610         Lab 10/31/24  0019 10/29/24  2220 10/29/24  0525 10/28/24  0350 10/27/24  0953   TOTAL PROTEIN 6.7 7.5 6.5 6.4 6.6   ALBUMIN 3.3* 3.8 3.4* 3.3* 3.3*   GLOBULIN 3.4 3.7 3.1 3.1 3.3   ALT (SGPT) 20 31 31 42* 45*   AST (SGOT) 24 37* 34* 56* 78*   BILIRUBIN 0.4 0.4 0.4 0.4 0.6   ALK PHOS 135* 207* 187* 192* 178*         Lab 10/26/24  1609   PROTIME 10.3   INR 0.94                 Brief  Urine Lab Results  (Last result in the past 365 days)        Color   Clarity   Blood   Leuk Est   Nitrite   Protein   CREAT   Urine HCG        08/14/24 1436 Yellow     250 Jovi/uL   500 Inna/ul   Negative                   Microbiology Results (last 10 days)       Procedure Component Value - Date/Time    Tissue / Bone Culture - Surgical Site, Toe, Left [[id number]] Collected: 10/30/24 1410    Lab Status: Preliminary result Specimen: Surgical Site from Toe, Left Updated: 10/31/24 0730     Tissue Culture No growth     Gram Stain No WBCs or organisms seen    AFB Culture - Surgical Site, Toe, Left [[id number]] Collected: 10/30/24 1410    Lab Status: Preliminary result Specimen: Surgical Site from Toe, Left Updated: 10/31/24 0842     AFB Stain No acid fast bacilli seen on concentrated smear    Wound Culture - Wound, Foot, Left [[id number]] Collected: 10/29/24 1823    Lab Status: Preliminary result Specimen: Wound from Foot, Left Updated: 10/31/24 0750     Wound Culture Culture in progress     Gram Stain Rare (1+) WBCs seen    Respiratory Panel PCR w/COVID-19(SARS-CoV-2) SHAYNE/JAMESON/[facility]REW/PAD/COR/DARRYL In-House, NP Swab in UTM/VTM, 2 HR TAT - Swab, Nasopharynx [[id number]]  (Normal) Collected: 10/26/24 1807    Lab Status: Final result Specimen: Swab from Nasopharynx Updated: 10/26/24 1859     ADENOVIRUS, PCR Not Detected     Coronavirus 229E Not Detected     Coronavirus HKU1 Not Detected     Coronavirus NL63 Not Detected     Coronavirus OC43 Not Detected     COVID19 Not Detected     Human Metapneumovirus Not Detected     Human Rhinovirus/Enterovirus Not Detected     Influenza A PCR Not Detected     Influenza B PCR Not Detected     Parainfluenza Virus 1 Not Detected     Parainfluenza Virus 2 Not Detected     Parainfluenza Virus 3 Not Detected     Parainfluenza Virus 4 Not Detected     RSV, PCR Not Detected     Bordetella pertussis pcr Not Detected     Bordetella parapertussis PCR Not Detected     Chlamydophila pneumoniae PCR Not  Detected     Mycoplasma pneumo by PCR Not Detected    Narrative:      In the setting of a positive respiratory panel with a viral infection PLUS a negative procalcitonin without other underlying concern for bacterial infection, consider observing off antibiotics or discontinuation of antibiotics and continue supportive care. If the respiratory panel is positive for atypical bacterial infection (Bordetella pertussis, Chlamydophila pneumoniae, or Mycoplasma pneumoniae), consider antibiotic de-escalation to target atypical bacterial infection.    Blood Culture - Blood, Arm, Right [[id number]]  (Normal) Collected: 10/26/24 1652    Lab Status: Final result Specimen: Blood from Arm, Right Updated: 10/31/24 1700     Blood Culture No growth at 5 days    Blood Culture - Blood, Arm, Left [[id number]]  (Normal) Collected: 10/26/24 1609    Lab Status: Final result Specimen: Blood from Arm, Left Updated: 10/31/24 1631     Blood Culture No growth at 5 days            XR Foot 3+ View Left    Result Date: 10/30/2024  Impression: Impression: Interval amputation of the second toe. Electronically Signed: Giovanni Bansal MD  10/30/2024 6:48 PM EDT  Workstation ID: [id number]    MRI Foot Left With & Without Contrast    Result Date: 10/29/2024  Impression: Impression: 1.Osteomyelitis centered at the second proximal interphalangeal joint of the second toe with ulceration at the dorsal aspect of the proximal interphalangeal joint. The middle phalanx and majority of proximal phalanx have abnormal marrow signal intensity. 2.There is also an age-indeterminate fracture of the base of the proximal phalanx second digit with mild impaction. No drainable fluid collection to suggest abscess. 3.Severe arthritis of the second metatarsophalangeal joint. Moderate arthritis third metatarsophalangeal joint. Status post great toe amputation. 4.Muscular atrophy likely consistent with diabetes. 5.Nonspecific dorsal soft tissue edema. 6.Degenerative changes  moderate to severe in degree of the hindfoot most pronounced at the talonavicular joint. Electronically Signed: Dalia Ramirez MD  10/29/2024 8:39 AM EDT  Workstation ID: [id number]    XR Foot 3+ View Left    Result Date: 10/26/2024  Impression: Impression: 1. Amputation of the first digit. 2. Soft tissue swelling of the foot and marked soft tissue swelling of the second and third digits. No radiopaque foreign body or gas within the soft tissues. 3. No conventional radiographic evidence of osteomyelitis. Electronically Signed: Fredy Stinson MD  10/26/2024 3:56 PM EDT  Workstation ID: [id number]    XR Chest 2 View    Result Date: 10/23/2024  Impression: Impression: 1.Haziness in the left lower lobe which could be reflective of a confluence of shadows given that the lateral view of the chest seems normal. 2.Degenerative change dorsal spine. Electronically Signed: David Jane MD  10/23/2024 1:29 PM EDT  Workstation ID: [id number]     Results for orders placed during the hospital encounter of 09/17/24    Doppler Ankle Brachial Index Single Level CAR    Interpretation Summary    Right Conclusion: The right ESVIN is normal. Normal digital pressures.    Left Conclusion: The left ESVIN is unable to be assessed due to vessel incompressibility. Normal digital pressures.    While the left-sided ABIs are unable to be calculated due to vessel incompressibility she has multiphasic waveforms indicating no major arterial insufficiencies.      Results for orders placed during the hospital encounter of 09/17/24    Doppler Ankle Brachial Index Single Level CAR    Interpretation Summary    Right Conclusion: The right ESVIN is normal. Normal digital pressures.    Left Conclusion: The left ESVIN is unable to be assessed due to vessel incompressibility. Normal digital pressures.    While the left-sided ABIs are unable to be calculated due to vessel incompressibility she has multiphasic waveforms indicating no major arterial  insufficiencies.          Labs Pending at Discharge:  Pending Labs       Order Current Status    Anaerobic Culture - Surgical Site, Toe, Left In process    Fungus Culture - Surgical Site, Toe, Left In process    Tissue Pathology Exam In process    AFB Culture - Surgical Site, Toe, Left Preliminary result    Tissue / Bone Culture - Surgical Site, Toe, Left Preliminary result    Wound Culture - Wound, Foot, Left Preliminary result            Procedures Performed  Procedure(s):  AMPUTATION LEFT SECOND TOE         Consults:   Consults       Date and Time Order Name Status Description    10/29/2024 11:56 AM Inpatient Infectious Diseases Consult Completed     10/26/2024  6:28 PM Inpatient Podiatry Consult                Discharge Details        Discharge Medications        ASK your doctor about these medications        Instructions Start Date   albuterol sulfate  (90 Base) MCG/ACT inhaler  Commonly known as: PROVENTIL HFA;VENTOLIN HFA;PROAIR HFA   2 puffs, Inhalation, Every 4 Hours PRN      amoxicillin 875 MG tablet  Commonly known as: AMOXIL   875 mg, Oral, 2 Times Daily      Bentyl 10 MG capsule  Generic drug: dicyclomine   10 mg, 4 Times Daily PRN      buPROPion  MG 24 hr tablet  Commonly known as: WELLBUTRIN XL   300 mg, Every Morning      divalproex 500 MG 24 hr tablet  Commonly known as: DEPAKOTE ER   1,500 mg, Nightly      gabapentin 300 MG capsule  Commonly known as: NEURONTIN  Ask about: Which instructions should I use?   300 mg, Oral, 3 Times Daily      GlucaGen HypoKit 1 MG injection  Generic drug: glucagon   GLUCAGEN HYPOKIT 1 MG SOLR      insulin aspart 100 UNIT/ML patient supplied pump  Commonly known as: NovoLOG   30-50 Units, 3 Times Daily Before Meals      Insulin Glargine 100 UNIT/ML injection pen  Commonly known as: LANTUS SOLOSTAR  Ask about: Which instructions should I use?   80 Units, Daily      levothyroxine 200 MCG tablet  Commonly known as: SYNTHROID, LEVOTHROID   200 mcg, Daily       lisinopril 20 MG tablet  Commonly known as: PRINIVIL,ZESTRIL   20 mg, Daily      omeprazole 40 MG capsule  Commonly known as: priLOSEC   40 mg, 2 Times Daily      Ozempic (0.25 or 0.5 MG/DOSE) 2 MG/3ML solution pen-injector  Generic drug: Semaglutide(0.25 or 0.5MG/DOS)   0.25 mg      ziprasidone 60 MG capsule  Commonly known as: GEODON   60 mg, Every Evening               Allergies   Allergen Reactions    Dust Mite Extract Unknown - Low Severity    Molds & Smuts Unknown - Low Severity    Bactrim [Sulfamethoxazole-Trimethoprim] Rash     blisters    Trimethoprim Rash         Discharge Disposition: home      Diet:  Hospital:  Diet Order   Procedures    Diet: Diabetic; Consistent Carbohydrate; Fluid Consistency: Thin (IDDSI 0)         Discharge Activity:         CODE STATUS:  Code Status and Medical Interventions: CPR (Attempt to Resuscitate); Full Support   Ordered at: 10/26/24 1826     Code Status (Patient has no pulse and is not breathing):    CPR (Attempt to Resuscitate)     Medical Interventions (Patient has pulse or is breathing):    Full Support         Future Appointments   Date Time Provider Department Center   11/12/2024  1:15 PM [facility] MRI 2 [facility] MRI [facility]           Time spent on Discharge including face to face service:  35 minutes    Signature: Electronically signed by Luis Alberto Mccoy MD, 11/01/24, 08:17 EDT.  [facility], [place], operated by [facility] Hospitalist Team

## 2024-11-01 NOTE — CASE MANAGEMENT/SOCIAL WORK
Case Management Discharge Note      Final Note: home with spouse    Provided Post Acute Provider List?: N/A  Provided Post Acute Provider Quality & Resource List?: N/A    Selected Continued Care - Discharged on 11/1/2024 Admission date: 10/26/2024 - Discharge disposition: Home or Self Care         Transportation Services  Private: Car    Final Discharge Disposition Code: 01 - home or self-care

## 2024-11-01 NOTE — PLAN OF CARE
Goal Outcome Evaluation:              Outcome Evaluation: Patient has been sleeping throughout this shift with complaints of left foot pain at beginning of this shift - see MAR. Patient refused am labs and states that she should have discharged yesterday evening and waiting to be discharged today. Patient with surgical dressing that is clean/dry/intact to left foot. Patient to follow up outpatient with podiatry on Monday 11/4.

## 2024-11-02 LAB
BACTERIA SPEC AEROBE CULT: ABNORMAL
BACTERIA SPEC AEROBE CULT: NORMAL
GRAM STN SPEC: ABNORMAL
GRAM STN SPEC: NORMAL

## 2024-11-04 LAB — BACTERIA SPEC ANAEROBE CULT: NORMAL

## 2024-11-04 NOTE — ANESTHESIA POSTPROCEDURE EVALUATION
Patient: Marilou VILCHIS Abbott    Procedure Summary       Date: 10/30/24 Room / Location: [facility] OR 01 / [facility] MAIN OR    Anesthesia Start: 1342 Anesthesia Stop: 1448    Procedure: AMPUTATION LEFT SECOND TOE (Left: Foot) Diagnosis:     Surgeons: Kan Gray DPM Provider: Rachelle Charles MD    Anesthesia Type: general ASA Status: 3            Anesthesia Type: general    Vitals  Vitals Value Taken Time   /57 10/30/24 1544   Temp 97.5 °F (36.4 °C) 10/30/24 1544   Pulse 72 10/30/24 1544   Resp 18 10/30/24 1544   SpO2 95 % 10/30/24 1544           Post Anesthesia Care and Evaluation    Patient location during evaluation: PACU  Patient participation: complete - patient participated  Level of consciousness: awake and alert  Pain management: satisfactory to patient    Airway patency: patent  Anesthetic complications: No anesthetic complications  PONV Status: none  Cardiovascular status: acceptable  Respiratory status: acceptable  Hydration status: acceptable

## 2024-11-06 ENCOUNTER — READMISSION MANAGEMENT (OUTPATIENT)
Dept: CALL CENTER | Facility: HOSPITAL | Age: 54
End: 2024-11-06
Payer: OTHER GOVERNMENT

## 2024-11-06 LAB
FUNGUS WND CULT: NORMAL
MYCOBACTERIUM SPEC CULT: NORMAL
NIGHT BLUE STAIN TISS: NORMAL

## 2024-11-06 NOTE — OUTREACH NOTE
General Surgery Week 1 Survey      Flowsheet Row Responses   [facility] patient discharged from? [facility]   Does the patient have one of the following disease processes/diagnoses(primary or secondary)? General Surgery   Week 1 attempt successful? Yes   Call start time 1536   Call end time 1537   Discharge diagnosis AMPUTATION LEFT SECOND TOE   Person spoke with today (if not patient) and relationship Spouse- Tomas   What is the patient's perception of their health status since discharge? Improving   Week 1 call completed? Yes   Revoked No further contact(revokes)-requires comment   Graduated/Revoked comments Spouse quickly ended the call   Wrap up additional comments Spouse states patient is fine, no concerns or questions noted- ended call before survery could be completely answered.   Call end time 1537            Ladan WARNER - Registered Nurse

## 2024-11-13 LAB
FUNGUS WND CULT: NORMAL
MYCOBACTERIUM SPEC CULT: NORMAL
NIGHT BLUE STAIN TISS: NORMAL

## 2024-11-20 LAB
FUNGUS WND CULT: NORMAL
MYCOBACTERIUM SPEC CULT: NORMAL
NIGHT BLUE STAIN TISS: NORMAL

## 2024-11-20 NOTE — PROGRESS NOTES
Enter Query Response Below      Query Response: The findings of acute osteomyelitis noted are consistent with my clinical impression and treatment plan for the patient.              If applicable, please update the problem list.   Patient: Marilou Abbott        : 1970  Account: [id number]           Admit Date: 10/26/2024      Please update your Progress Note with the answer to the following query:        Dr. Gray,  Date: 2024    Based on Inpatient coding guidelines [facility] are not allowed to use diagnoses from pathology reports as the sole basis for billing. Any findings noted on a pathology report must be affirmed by the treating physician before billing.      Procedure Performed:  Amputation left 2nd toe    The pathologist reported that the specimen shows:  FINAL DIAGNOSIS  Toe, left second, amputation:  Epidermal ulceration overlying gangrenous necrosis of soft tissues  Bone positive for acute osteomyelitis        Is this finding consistent with your clinical impression and treatment plan for this patient?    Yes  No  Other explanation for clinical impression and treatment plan_________  Clinically indeterminable    If you have questions about this query, please contact me at [phone]    Sincerely,  Felicia Tracy, [facility]  Hospital Coding Department

## 2024-11-27 LAB
FUNGUS WND CULT: NORMAL
MYCOBACTERIUM SPEC CULT: NORMAL
NIGHT BLUE STAIN TISS: NORMAL

## 2024-12-04 LAB
MYCOBACTERIUM SPEC CULT: NORMAL
NIGHT BLUE STAIN TISS: NORMAL

## 2024-12-11 LAB
MYCOBACTERIUM SPEC CULT: NORMAL
NIGHT BLUE STAIN TISS: NORMAL

## (undated) DEVICE — SPNG LAP PREWSH SFTPK 18X18IN STRL PK/5

## (undated) DEVICE — BLD SCLPL BEAVR MINI STR 2BVL 180D LF

## (undated) DEVICE — THE STERILE LIGHT HANDLE COVER IS USED WITH STERIS SURGICAL LIGHTING AND VISUALIZATION SYSTEMS.

## (undated) DEVICE — INTENDED FOR TISSUE SEPARATION, AND OTHER PROCEDURES THAT REQUIRE A SHARP SURGICAL BLADE TO PUNCTURE OR CUT.: Brand: BARD-PARKER ® CARBON RIB-BACK BLADES

## (undated) DEVICE — BNDG ELAS CO-FLEX SLF ADHR 4IN5YD LF STRL

## (undated) DEVICE — BNDG,ELSTC,MATRIX,STRL,3"X5YD,LF,HOOK&LP: Brand: MEDLINE

## (undated) DEVICE — KT SURG TURNOVER 050

## (undated) DEVICE — SOLUTION,WATER,IRRIGATION,1000ML,STERILE: Brand: MEDLINE

## (undated) DEVICE — SOL IRR NACL 0.9PCT BO 1000ML

## (undated) DEVICE — BANDAGE,GAUZE,BULKEE II,4.5"X4.1YD,STRL: Brand: MEDLINE

## (undated) DEVICE — GLV SURG SIGNATURE ESSENTIAL PF LTX SZ7

## (undated) DEVICE — SPNG GZ WOVN 4X4IN 12PLY 10/BX STRL

## (undated) DEVICE — WET SKIN PREP TRAY: Brand: MEDLINE INDUSTRIES, INC.

## (undated) DEVICE — GLV SURG SENSICARE PI MIC PF SZ5.5 LF STRL

## (undated) DEVICE — SUT VIC 3/0 CT2 27IN J232H

## (undated) DEVICE — BNDG ESMARK 4IN 12FT LF STRL BLU

## (undated) DEVICE — DRSNG WND GZ CURAD OIL EMULSION 3X3IN STRL

## (undated) DEVICE — CUFF TOURNI 1BLADDER 1PRT 12IN STRL

## (undated) DEVICE — MICRO DUAL CUT (9.0 X 0.38 X 25.0MM)

## (undated) DEVICE — PENCL SMOKE/EVAC MEGADYNE TELESCP 15FT

## (undated) DEVICE — DRAPE,TOWEL,LARGE,INVISISHIELD: Brand: MEDLINE

## (undated) DEVICE — BANDAGE,GAUZE,CONFORMING,3"X75",STRL,LF: Brand: MEDLINE

## (undated) DEVICE — THE STERILE CAMERA HANDLE COVER IS FOR USE WITH THE STERIS SURGICAL LIGHTING AND VISUALIZATION SYSTEMS.

## (undated) DEVICE — UNDERGLV SURG BIOGEL INDICAT PF 71/2 GRN

## (undated) DEVICE — PK EXTREM 50

## (undated) DEVICE — GOWN,REINFORCE,POLY,SIRUS,BREATH SLV,XLG: Brand: MEDLINE

## (undated) DEVICE — PAD,ABDOMINAL,5"X9",STERILE,LF,1/PK: Brand: MEDLINE INDUSTRIES, INC.